# Patient Record
Sex: FEMALE | Race: WHITE | NOT HISPANIC OR LATINO | Employment: UNEMPLOYED | URBAN - METROPOLITAN AREA
[De-identification: names, ages, dates, MRNs, and addresses within clinical notes are randomized per-mention and may not be internally consistent; named-entity substitution may affect disease eponyms.]

---

## 2017-02-08 ENCOUNTER — ALLSCRIPTS OFFICE VISIT (OUTPATIENT)
Dept: OTHER | Facility: OTHER | Age: 62
End: 2017-02-08

## 2017-03-16 RX ORDER — IBUPROFEN 200 MG
200 TABLET ORAL EVERY 6 HOURS PRN
COMMUNITY
End: 2020-10-28 | Stop reason: ALTCHOICE

## 2017-03-16 RX ORDER — ASCORBIC ACID 500 MG
500 TABLET ORAL DAILY
COMMUNITY
End: 2018-05-22

## 2017-03-16 RX ORDER — VIT C/HESPERIDIN/BIOFLAVONOIDS 500-100 MG
TABLET ORAL
COMMUNITY
End: 2018-05-22

## 2017-03-16 RX ORDER — PHENOL 1.4 %
600 AEROSOL, SPRAY (ML) MUCOUS MEMBRANE DAILY
COMMUNITY
End: 2018-05-22

## 2017-03-19 ENCOUNTER — ANESTHESIA EVENT (OUTPATIENT)
Dept: GASTROENTEROLOGY | Facility: MEDICAL CENTER | Age: 62
End: 2017-03-19
Payer: COMMERCIAL

## 2017-03-20 ENCOUNTER — HOSPITAL ENCOUNTER (OUTPATIENT)
Facility: MEDICAL CENTER | Age: 62
Setting detail: OUTPATIENT SURGERY
Discharge: HOME | End: 2017-03-20
Attending: INTERNAL MEDICINE | Admitting: INTERNAL MEDICINE
Payer: COMMERCIAL

## 2017-03-20 ENCOUNTER — GENERIC CONVERSION - ENCOUNTER (OUTPATIENT)
Dept: GASTROENTEROLOGY | Facility: MEDICAL CENTER | Age: 62
End: 2017-03-20

## 2017-03-20 ENCOUNTER — ANESTHESIA (OUTPATIENT)
Dept: GASTROENTEROLOGY | Facility: MEDICAL CENTER | Age: 62
End: 2017-03-20
Payer: COMMERCIAL

## 2017-03-20 VITALS
TEMPERATURE: 96.3 F | DIASTOLIC BLOOD PRESSURE: 64 MMHG | HEIGHT: 59 IN | OXYGEN SATURATION: 100 % | HEART RATE: 56 BPM | RESPIRATION RATE: 15 BRPM | WEIGHT: 140 LBS | BODY MASS INDEX: 28.22 KG/M2 | SYSTOLIC BLOOD PRESSURE: 121 MMHG

## 2017-03-20 DIAGNOSIS — K21.9 GASTRO-ESOPHAGEAL REFLUX DISEASE WITHOUT ESOPHAGITIS: ICD-10-CM

## 2017-03-20 DIAGNOSIS — Z12.11 ENCOUNTER FOR SCREENING FOR MALIGNANT NEOPLASM OF COLON: ICD-10-CM

## 2017-03-20 PROCEDURE — 88342 IMHCHEM/IMCYTCHM 1ST ANTB: CPT | Performed by: INTERNAL MEDICINE

## 2017-03-20 PROCEDURE — 88305 TISSUE EXAM BY PATHOLOGIST: CPT | Performed by: INTERNAL MEDICINE

## 2017-03-20 RX ORDER — PROPOFOL 10 MG/ML
INJECTION, EMULSION INTRAVENOUS AS NEEDED
Status: DISCONTINUED | OUTPATIENT
Start: 2017-03-20 | End: 2017-03-20 | Stop reason: SURG

## 2017-03-20 RX ORDER — SODIUM CHLORIDE 9 MG/ML
125 INJECTION, SOLUTION INTRAVENOUS CONTINUOUS
Status: DISCONTINUED | OUTPATIENT
Start: 2017-03-20 | End: 2017-03-20 | Stop reason: HOSPADM

## 2017-03-20 RX ADMIN — SODIUM CHLORIDE 125 ML/HR: 0.9 INJECTION, SOLUTION INTRAVENOUS at 07:41

## 2017-03-20 RX ADMIN — PROPOFOL 25 MG: 10 INJECTION, EMULSION INTRAVENOUS at 08:21

## 2017-03-20 RX ADMIN — SODIUM CHLORIDE: 0.9 INJECTION, SOLUTION INTRAVENOUS at 08:08

## 2017-03-20 RX ADMIN — PROPOFOL 25 MG: 10 INJECTION, EMULSION INTRAVENOUS at 08:13

## 2017-03-20 RX ADMIN — PROPOFOL 125 MG: 10 INJECTION, EMULSION INTRAVENOUS at 08:08

## 2017-03-20 RX ADMIN — PROPOFOL 50 MG: 10 INJECTION, EMULSION INTRAVENOUS at 08:30

## 2017-03-20 RX ADMIN — PROPOFOL 50 MG: 10 INJECTION, EMULSION INTRAVENOUS at 08:24

## 2017-03-20 RX ADMIN — PROPOFOL 25 MG: 10 INJECTION, EMULSION INTRAVENOUS at 08:18

## 2017-03-20 RX ADMIN — LIDOCAINE HYDROCHLORIDE 50 MG: 20 INJECTION, SOLUTION INTRAVENOUS at 08:08

## 2017-03-23 ENCOUNTER — ALLSCRIPTS OFFICE VISIT (OUTPATIENT)
Dept: OTHER | Facility: OTHER | Age: 62
End: 2017-03-23

## 2017-03-23 LAB
CLARITY UR: NORMAL
COLOR UR: YELLOW
GLUCOSE (HISTORICAL): NORMAL
HGB UR QL STRIP.AUTO: NORMAL
KETONES UR STRIP-MCNC: NORMAL MG/DL
LEUKOCYTE ESTERASE UR QL STRIP: NORMAL
NITRITE UR QL STRIP: NORMAL
PH UR STRIP.AUTO: 6.5 [PH]
PROT UR STRIP-MCNC: NORMAL MG/DL
SP GR UR STRIP.AUTO: 1

## 2017-04-03 ENCOUNTER — GENERIC CONVERSION - ENCOUNTER (OUTPATIENT)
Dept: OTHER | Facility: OTHER | Age: 62
End: 2017-04-03

## 2017-04-03 LAB
HEPATITIS C ANTIBODY (HISTORICAL): NORMAL
SIGNAL TO CUT-OFF (HISTORICAL): 0.01

## 2017-04-05 ENCOUNTER — GENERIC CONVERSION - ENCOUNTER (OUTPATIENT)
Dept: OTHER | Facility: OTHER | Age: 62
End: 2017-04-05

## 2017-04-26 ENCOUNTER — HOSPITAL ENCOUNTER (OUTPATIENT)
Dept: MRI IMAGING | Facility: HOSPITAL | Age: 62
Discharge: HOME | End: 2017-04-26
Attending: NEUROLOGICAL SURGERY
Payer: COMMERCIAL

## 2017-04-28 ENCOUNTER — TRANSCRIBE ORDERS (OUTPATIENT)
Dept: ADMINISTRATIVE | Facility: HOSPITAL | Age: 62
End: 2017-04-28

## 2017-04-28 ENCOUNTER — HOSPITAL ENCOUNTER (OUTPATIENT)
Dept: MRI IMAGING | Facility: HOSPITAL | Age: 62
Discharge: HOME | End: 2017-04-28
Attending: NEUROLOGICAL SURGERY
Payer: COMMERCIAL

## 2017-04-28 DIAGNOSIS — D42.0 NEOPLASM OF UNCERTAIN BEHAVIOR OF CEREBRAL MENINGES (HCC): ICD-10-CM

## 2017-04-28 PROCEDURE — 70553 MRI BRAIN STEM W/O & W/DYE: CPT

## 2017-04-28 PROCEDURE — A9585 GADOBUTROL INJECTION: HCPCS | Performed by: NEUROLOGICAL SURGERY

## 2017-04-28 RX ADMIN — GADOBUTROL 6 ML: 604.72 INJECTION INTRAVENOUS at 21:29

## 2017-05-02 ENCOUNTER — TRANSCRIBE ORDERS (OUTPATIENT)
Dept: ADMINISTRATIVE | Facility: HOSPITAL | Age: 62
End: 2017-05-02

## 2017-05-02 ENCOUNTER — ALLSCRIPTS OFFICE VISIT (OUTPATIENT)
Dept: OTHER | Facility: OTHER | Age: 62
End: 2017-05-02

## 2017-05-02 DIAGNOSIS — Z48.811 AFTERCARE FOLLOWING SURGERY OF THE NERVOUS SYSTEM, NEC: Primary | ICD-10-CM

## 2017-05-02 DIAGNOSIS — Z48.811 ENCOUNTER FOR SURGICAL AFTERCARE FOLLOWING SURGERY OF NERVOUS SYSTEM: ICD-10-CM

## 2017-06-26 ENCOUNTER — GENERIC CONVERSION - ENCOUNTER (OUTPATIENT)
Dept: OTHER | Facility: OTHER | Age: 62
End: 2017-06-26

## 2017-06-26 ENCOUNTER — APPOINTMENT (OUTPATIENT)
Dept: RADIATION ONCOLOGY | Facility: HOSPITAL | Age: 62
End: 2017-06-26
Attending: RADIOLOGY
Payer: COMMERCIAL

## 2017-06-26 PROCEDURE — 99214 OFFICE O/P EST MOD 30 MIN: CPT | Performed by: RADIOLOGY

## 2017-06-26 PROCEDURE — G0463 HOSPITAL OUTPT CLINIC VISIT: HCPCS | Performed by: RADIOLOGY

## 2017-10-05 ENCOUNTER — ALLSCRIPTS OFFICE VISIT (OUTPATIENT)
Dept: OTHER | Facility: OTHER | Age: 62
End: 2017-10-05

## 2017-10-05 LAB
BILIRUB UR QL STRIP: NORMAL
CLARITY UR: NORMAL
COLOR UR: YELLOW
GLUCOSE (HISTORICAL): NORMAL
HGB UR QL STRIP.AUTO: NORMAL
KETONES UR STRIP-MCNC: NORMAL MG/DL
LEUKOCYTE ESTERASE UR QL STRIP: NORMAL
NITRITE UR QL STRIP: NORMAL
PH UR STRIP.AUTO: 6 [PH]
PROT UR STRIP-MCNC: NORMAL MG/DL
SP GR UR STRIP.AUTO: 1
UROBILINOGEN UR QL STRIP.AUTO: NORMAL

## 2017-10-25 ENCOUNTER — HOSPITAL ENCOUNTER (OUTPATIENT)
Dept: MRI IMAGING | Facility: HOSPITAL | Age: 62
Discharge: HOME | End: 2017-10-25
Attending: NEUROLOGICAL SURGERY
Payer: COMMERCIAL

## 2017-10-25 DIAGNOSIS — Z48.811 ENCOUNTER FOR SURGICAL AFTERCARE FOLLOWING SURGERY OF NERVOUS SYSTEM: ICD-10-CM

## 2017-10-25 PROCEDURE — A9585 GADOBUTROL INJECTION: HCPCS | Performed by: NEUROLOGICAL SURGERY

## 2017-10-25 PROCEDURE — 70553 MRI BRAIN STEM W/O & W/DYE: CPT

## 2017-10-25 RX ADMIN — GADOBUTROL 6 ML: 604.72 INJECTION INTRAVENOUS at 20:08

## 2017-11-02 ENCOUNTER — ALLSCRIPTS OFFICE VISIT (OUTPATIENT)
Dept: OTHER | Facility: OTHER | Age: 62
End: 2017-11-02

## 2017-11-02 ENCOUNTER — TRANSCRIBE ORDERS (OUTPATIENT)
Dept: ADMINISTRATIVE | Facility: HOSPITAL | Age: 62
End: 2017-11-02

## 2017-11-02 DIAGNOSIS — D42.0 TERATOMA OF UNCERTAIN BEHAVIOR OF CEREBRAL MENINGES (HCC): Primary | ICD-10-CM

## 2017-11-02 DIAGNOSIS — C67.9 MALIGNANT NEOPLASM OF BLADDER (HCC): ICD-10-CM

## 2017-11-02 DIAGNOSIS — Z48.3 AFTERCARE FOLLOWING SURGERY FOR NEOPLASM: ICD-10-CM

## 2017-11-02 DIAGNOSIS — D42.0 NEOPLASM OF UNCERTAIN BEHAVIOR OF CEREBRAL MENINGES (HCC): ICD-10-CM

## 2017-11-03 NOTE — PROGRESS NOTES
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)    Plan   · Follow-up visit in 6 months Evaluation and Treatment  Follow-up  Status: Hold For -  Scheduling  Requested for: [id number]   Ordered; For: Adenocarcinoma of bladder, Aftercare following surgery for neoplasm, Atypical intracranial meningioma; Ordered By: China Meléndez Performed:  Due: [id number]   · * MRI BRAIN W WO CONTRAST; Status:Need Information - Financial Authorization; Requested GKV:[id number];    Perform:[facility]; OOU:[id number]; Ordered; For:Adenocarcinoma of bladder, Aftercare following surgery for neoplasm, Atypical intracranial meningioma; Ordered By:Christiano Davis;    Discussion/Summary    This is a 44-year-old female who had a very large atypical meningioma  She is status post resection and radiation therapy  In general she is doing exceptionally well  She has no neurologic deficits  I have recommended continued vigilant six-month evaluations given the aggressiveness of this tumor  There are 2 small areas of contrast enhancement on the MRI which likely are scar but will declare themselves over time  Chief Complaint  Patient presents for 6 month follow up with MRI Brain w/wo contrast s/p XRT 6/26/15- 8/7/15 and Image-guided right frontotemporal craniotomy for resection of the large meningioma with dural reconstruction on 5/6/15 (Approx  2 years out)Path: 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)      History of Present Illness  Patient is a 67yo female with a history of AVM status post clipping who presented to [address] [place] [address] ED on 5/2/15 complaining of headaches, acoustic sensitivity and lower extremity weakness  The patient subsequently underwent a CT of the head that showed a 3 cm superficial right frontal mass with significant vasogenic edema and mass effect   The patient was subsequently admitted and had neurosurgery consultation for decompression as she was found to have large right to left midline shift with uncal herniation, as well as significant edema  The patient had an MRI of the brain as well as a CAT scan of the chest, abdomen and pelvis  The CT of the chest, abdomen and pelvis showed a 10 mm subtle papillary soft tissue projection along the left posterior paramedian bladder wall  The, urology was consulted for further correlation and they recommended that she have a cystoscopy as an outpatient  MRI of the brain was done that showed a 5 cm right frontal lobe mass consistent with meningioma  patient did undergo an image-guided right frontotemporal craniotomy for resection of the large meningioma with dural reconstruction on May 6, 2015 by Dr Clark Bates  The patient did well postoperatively and was followed by neurosurgery  Her TERESSA drain was subsequently removed  The patient did well throughout this hospitalization without any residual neurological deficits  She was instructed to follow up with our office following discharge, as well as the urologist, Dr Jyotsna Qureshi to get a cystoscopy to further evaluate that bladder mass  5/21/15, patient returned to our office for POV s/p resection or a large meningioma  She was doing well  The patient reported headache  Pathology determined to represent atypical form with high risk for recurrence  XRT was indicated  6/25/15, she was seen for her 6 week POV  She was on Keppra and was to continue on it  Recommended no driving at that point  Continued follow up was essential   returned to our office on 10/27/15 after XRT  MRI of the brain with and without contrast demonstrated no regions of abnormal contrast enhancement  The midline shift was completely corrected  There were no extra axial collections  There was no evidence of disease  Her performance scale was without deficits  She does have rare headaches which are treated with Tylenol   I recommended the following Keppra taper:  AM dose PM [id number]  [id number]  [id number]  NONEStop Paige  seen on 4/28/16 for follow up with routine MRI of the brain for review  She reported completion of Keppra as directed  She presented 1 year out from the resection of a atypical meningioma; she was neurologically intact and doing well save for some anxiety  This was inhibiting her from returning to work I recommended that she consult with a psychologist who might be very helpful in improving her anxiety  I told her that this was sometimes expected after surgery like this, however the frontal lobe function should not produce such a function intrinsically  Given her grade 2 meningioma continued follow-up is important  was seen on 5/2/17 and was overall doing very well  Because of the atypical pathology associated with this mass we recommended six-month follow-ups at this point with imaging  patient complaints of numbness of the right arm occasionally  She also has problems with urinary incontinence but takes medication for this  Other than that, no other complaints at this time  The patient is doing very well in general  She does have some sensitivity over the plates  She is working on a regular basis and does not enjoy her job but this is unchanged in reference to the surgery  Review of Systems    Constitutional: No fever, no chills, feels well, no tiredness, no recent weight gain or weight loss  Eyes: No complaints of eye pain, no red eyes, no eyesight problems, no discharge, no dry eyes, no itching of eyes  ENT: no complaints of earache, no loss of hearing, no nose bleeds, no nasal discharge, no sore throat, no hoarseness  Cardiovascular: No complaints of slow heart rate, no fast heart rate, no chest pain, no palpitations, no leg claudication, no lower extremity edema  Respiratory: No complaints of shortness of breath, no wheezing, no cough, no SOB on exertion, no orthopnea, no PND  Gastrointestinal: No complaints of abdominal pain, no constipation, no nausea or vomiting, no diarrhea, no bloody stools     Genitourinary: incontinence, but-- as noted in HPI  Musculoskeletal: No complaints of arthralgias, no myalgias, no joint swelling or stiffness, no limb pain or swelling  Integumentary: No complaints of skin rash or lesions, no itching, no skin wounds, no breast pain or lump  Neurological: numbness, but-- as noted in HPI  Psychiatric: Not suicidal, no sleep disturbance, no anxiety or depression, no change in personality, no emotional problems  Endocrine: No complaints of proptosis, no hot flashes, no muscle weakness, no deepening of the voice, no feelings of weakness  Hematologic/Lymphatic: No complaints of swollen glands, no swollen glands in the neck, does not bleed easily, does not bruise easily  ROS reviewed  Active Problems  1  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)   2  Aftercare following surgery for neoplasm (V58 42) (Z48 3)   3  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)   4  Bladder mass (596 89) (N32 89)   5  Colon cancer screening (V76 51) (Z12 11)   6  Dysuria (788 1) (R30 0)   7  Encounter for mammogram to establish baseline mammogram (V76 12) (Z12 31)   8  Encounter for surgical aftercare following surgery of nervous system (V58 72) (Z48 811)   9  GERD (gastroesophageal reflux disease) (530 81) (K21 9)   10  Meningioma (225 2) (D32 9)   11  Overactive bladder (596 51) (N32 81)   12  Personal history of colonic polyps (V12 72) (Z86 010)   13  UTI (urinary tract infection) (599 0) (N39 0)    Past Medical History  1  History of arteriovenous malformation (V12 59) (Z87 74)   2  History of Liver problem (573 9) (K76 9)   3  Status post gamma knife treatment (V15 3) (Z92 3)    The active problems and past medical history were reviewed and updated today  Surgical History  1  History of Cholecystotomy   2  History of Craniotomy (Diagnostic)   3  History of Diagnostic Cystoscopy   4  History of Liver Surgery   5  History of Tubal Ligation    The surgical history was reviewed and updated today         Family History  Mother    1  Family history of cardiomegaly (V17 49) (Z82 49)   2  Family history of lung cancer (V16 1) (Z80 1)  Father    3  Family history of lung cancer (V16 1) (Z80 1)   4  Family history of malignant neoplasm of brain (V16 8) (Z80 8)  Sister    5  Family history of Diverticulitis  Maternal Grandmother    6  Family history of renal failure (V18 69) (Z84 1)  Paternal Grandmother    7  Family history of lung cancer (V16 1) (Z80 1)   8  Family history of malignant neoplasm of brain (V16 8) (Z80 8)   9  Family history of malignant neoplasm of breast (V16 3) (Z80 3)    The family history was reviewed and updated today  Social History   · Denied: History of Drug use   · Former smoker (V15 82) (W86 025)   · Four children   · High school or GED   · Lives with family   ·    · Occasional alcohol use   · Occupation   · Denied: History of Sexually active  The social history was reviewed and updated today  Current Meds   1  B Complex CAPS; take 1 capsule daily; Therapy: (Recorded:02Nov2017) to Recorded   2  Biotin CAPS; take 1 capsule daily; Therapy: (Recorded:02Nov2017) to Recorded   3  Calcium 600 MG Oral Tablet; TAKE 1 TABLET DAILY; Therapy: (Recorded:02May2017) to Recorded   4  Daily Multivitamin TABS; TAKE 1 TABLET DAILY; Therapy: (Recorded:22May2015) to Recorded   5  Ibuprofen 200 MG Oral Capsule; 1-2 caps daily as needed for headaches; Therapy: (Rebekah Ochoa) to Recorded   6  Oxybutynin Chloride ER 10 MG Oral Tablet Extended Release 24 Hour; Take 1 tablet   daily; Therapy: 52DSA5642 to (Evaluate:85Agt0056)  Requested for: 87LYZ7150; Last   Rx:13Oct2017 Ordered   7  Tylenol Extra Strength 500 MG Oral Tablet; TAKE 1 TABLET EVERY 4 TO 6 HOURS AS   NEEDED; Therapy: (Recorded:22May2015) to Recorded   8  Vitamin C 500 MG Oral Tablet; TAKE 1 TABLET DAILY; Therapy: (Rebekah Ochoa) to Recorded   9  Zantac CAPS; TAKE 1 CAPSULE AT BEDTIME PRN;    Therapy: (Recorded:02Nov2017) to Recorded    The medication list was reviewed and updated today  Allergies  1  No Known Drug Allergies  2  IV Dye    Vitals  Vital Signs    Recorded: 83SFR8598 10:05AM   Temperature 97 8 F   Heart Rate 62   Respiration 18   Systolic 782   Diastolic 73   Height 4 ft 11 in   Weight 141 lb 5 oz   BMI Calculated 28 54   BSA Calculated 1 59   Pain Scale 0     Physical Exam     Mental Status: Alert and Oriented x3  -- Memory is intact  -- Attentive  -- Speech is articulate and fluent  -- Knowledge and vocabulary consistent with education  -- Grossly nonfocal     Cranial Nerve Exam:  2nd cranial nerve: Normal with no noted deficit  -- 3rd, 4th, and 6th cranial nerves: PERRLA and EOMI without later gaze nystagmus  -- 5th cranial nerve: Normal with no noted deficit  -- 7th cranial nerve: Face symmetrical at grimace and at rest  -- 8th cranial nerve: Grossly intact to finger rub bilaterally  -- 11th cranial nerve: Shoulder shrug equal bilaterally  Motor System - Upper Extremities: Muscle strength: 5/5 bilaterally  -- Muscle tone: Normal bilaterally  -- Muscle Bulk: Normal Muscle bulk bilaterally  Motor System - Lower Extremities: Muscle strength: 5/5 bilaterally  -- Muscle tone: Normal bilaterally  -- Muscle Bulk: Normal Muscle bulk bilaterally  Coordination: Coordination: Finger to nose was normal, heel to knee to shin was normal able to perform rapid alternating movements well bilaterally  -- Involuntary movements: None   Sensory: Sensation:Sensation grossly intact to light tough and pinprick  Gait and Station: Gait and station: [employer] with a normal gait and station  Tandem walk is normal, Heel walk and toe walk intact and without sign of paresis  Results/Data  Diagnostic Studies Reviewed Neurosurger [facility]:   I personally reviewed the MRI of the brain in detail with the patient  MRI Review and MRI of the brain is carefully reviewed with the patient   This demonstrates complete resection of the meningioma  There are 2 contrast enhancing regions which are subcentimeter in size and may reflect residual tumor versus scar  Health Management  Colon cancer screening   COLONOSCOPY (GI, SURG); every 10 years; Last [id number]; Next Due: 20Mar2027; Active    Future Appointments    Date/Time Provider Specialty Site   04/05/2018 08:30 AM SUMANTH Devine   Urology [facility]   11/30/2017 09:15 AM [facility]  [address]     Signatures   Electronically signed by : SUMANTH Seymour ; Nov 2 2017 11:03AM EST                       (Author)

## 2017-12-07 ENCOUNTER — APPOINTMENT (OUTPATIENT)
Dept: RADIATION ONCOLOGY | Facility: HOSPITAL | Age: 62
End: 2017-12-07
Attending: RADIOLOGY
Payer: COMMERCIAL

## 2017-12-07 ENCOUNTER — GENERIC CONVERSION - ENCOUNTER (OUTPATIENT)
Dept: OTHER | Facility: OTHER | Age: 62
End: 2017-12-07

## 2017-12-07 PROCEDURE — G0463 HOSPITAL OUTPT CLINIC VISIT: HCPCS | Performed by: RADIOLOGY

## 2017-12-07 PROCEDURE — 99214 OFFICE O/P EST MOD 30 MIN: CPT | Performed by: RADIOLOGY

## 2017-12-13 ENCOUNTER — GENERIC CONVERSION - ENCOUNTER (OUTPATIENT)
Dept: OTHER | Facility: OTHER | Age: 62
End: 2017-12-13

## 2018-01-12 VITALS
WEIGHT: 140.13 LBS | SYSTOLIC BLOOD PRESSURE: 132 MMHG | BODY MASS INDEX: 28.25 KG/M2 | HEIGHT: 59 IN | HEART RATE: 60 BPM | OXYGEN SATURATION: 99 % | DIASTOLIC BLOOD PRESSURE: 76 MMHG | TEMPERATURE: 96.3 F

## 2018-01-12 NOTE — RESULT NOTES
Message   please change remidner to 10 years  thanks  Verified Results  (Q) HEPATITIS C ANTIBODY [id number] 12:47PM Alix Erica   REPORT COMMENT:  ON HOLD  FASTING:NO     Test Name Result Flag Reference   HEPATITIS C ANTIBODY NON-REACTIVE  NON-REACTIVE   SIGNAL TO CUT-OFF 0 01  <1 00     (1) TISSUE EXAM [id number] 08:15AM Alix Erica     Test Name Result Flag Reference   LAB AP CASE REPORT (Report)     Surgical Pathology Report             Case: [id number]                   Authorizing Provider: Babatunde Jarrett MD       Collected:      03/20/2017 0815        Ordering Location:   [address] [place] [address]    Received:      03/20/2017 [facility]                            Pathologist:      Marychuy Valencia MD                             Specimens:  A) - Stomach, Gastric body bx                                     B) - Large Intestine, Sigmoid Colon, Sigmoid polyp   LAB AP FINAL DIAGNOSIS (Report)     A  Stomach, body, biopsy:  - Chronic inactive oxyntic gastritis  - Negative for Helicobacter pylori, confirmed by immunohistochemical   stain, evaluated with appropriate positive controls  - Negative for atrophy, intestinal metaplasia, dysplasia or carcinoma  B  Colon, sigmoid polyp, biopsy:  - Portion of benign colonic mucosa with reactive surface change and   mucosal lymphoid aggregate, negative for adenoma or carcinoma  Electronically signed by Marychuy Valencia MD on 3/22/2017 at 11:34 AM   LAB AP SURGICAL ADDITIONAL INFORMATION (Report)     These tests were developed and their performance characteristics   determined by [facility] or [facility]  They may not be cleared or approved by the [place]  Food and   Drug Administration  The FDA has determined that such clearance or   approval is not necessary  These tests are used for clinical purposes  They should not be regarded as investigational or for research   This   laboratory has been approved by [place] , designated as a high-complexity   laboratory and is qualified to perform these tests  Interpretation performed at [facility], [address] [place] [address]   LAB AP GROSS DESCRIPTION (Report)     A  The specimen is received in formalin, labeled with the patient's name   and hospital number, and is designated gastric body biopsy  The   specimen consists of several, rubbery, tan-brown tissue fragments   measuring 0 8 x 0 6 x 0 2 cm in aggregate dimension  Entirely submitted  One cassette  B  The specimen is received in formalin, labeled with the patient's name   and hospital number, and is designated sigmoid polyp  The specimen   consists of a single, rubbery, tan-brown tissue fragment measuring up to   0 3 cm in greatest dimension  Entirely submitted  One cassette  Note: The estimated total formalin fixation time based upon information   provided by the submitting clinician and the standard processing schedule   is 20 0 hours  [facility]   LAB AP CLINICAL INFORMATION R/o H pylori     R/o H pylori       Discussion/Summary   Biopsies were negative in the stomach and hte colon polyp was completely benign  I would suggest repeating the colonoscopy in 10 years or sooner if there is any family history of colon polyps/ cancer or if she has symptoms  She did have some findings of inflammation in the esopahgus due to acid reflux  Continue with acid reflux diet

## 2018-01-13 VITALS
SYSTOLIC BLOOD PRESSURE: 120 MMHG | HEIGHT: 59 IN | WEIGHT: 142 LBS | DIASTOLIC BLOOD PRESSURE: 80 MMHG | BODY MASS INDEX: 28.63 KG/M2

## 2018-01-13 VITALS
TEMPERATURE: 97.6 F | HEART RATE: 67 BPM | BODY MASS INDEX: 28.52 KG/M2 | SYSTOLIC BLOOD PRESSURE: 130 MMHG | WEIGHT: 141.5 LBS | OXYGEN SATURATION: 98 % | RESPIRATION RATE: 16 BRPM | HEIGHT: 59 IN | DIASTOLIC BLOOD PRESSURE: 74 MMHG

## 2018-01-14 VITALS
WEIGHT: 139 LBS | BODY MASS INDEX: 28.02 KG/M2 | HEIGHT: 59 IN | HEART RATE: 52 BPM | SYSTOLIC BLOOD PRESSURE: 120 MMHG | DIASTOLIC BLOOD PRESSURE: 60 MMHG

## 2018-01-15 VITALS
RESPIRATION RATE: 18 BRPM | TEMPERATURE: 97.8 F | BODY MASS INDEX: 28.49 KG/M2 | DIASTOLIC BLOOD PRESSURE: 73 MMHG | HEIGHT: 59 IN | SYSTOLIC BLOOD PRESSURE: 141 MMHG | WEIGHT: 141.31 LBS | HEART RATE: 62 BPM

## 2018-01-16 NOTE — MISCELLANEOUS
Dear Murphy Truongr,    [address] office has attempted to contact you regarding your results  Please give our office a call back at [phone]      Thank you,     [facility]      Electronically signed by:Rosy Patel   Apr 7 2017  9:18AM EST

## 2018-01-17 NOTE — PROGRESS NOTES
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)   2  Encounter for surgical aftercare following surgery of nervous system (V58 72) (Z48 811)    Plan    · Follow-up visit in 6 months Evaluation and Treatment  Follow-up  Status: Complete   Done: [id number]   Ordered; For: Encounter for surgical aftercare following surgery of nervous system; Ordered By: Ba Gamez Performed:  Due: 31BYN1091; Last Updated By: Bonifacio Osborne; 5/2/2017 9:57:32 AM   · * MRI BRAIN W WO CONTRAST; Status:Need Information - Financial Authorization; Requested for:[id number];    Perform:[facility]; LTP:[id number]; Last Updated By:Consuelo Kerns; 5/2/2017 10:08:10 AM;Ordered;  For:Encounter for surgical aftercare following surgery of nervous system; Ordered By:Haroon Davis;    Discussion/Summary    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  Overall the patient is doing very well  Because of the atypical pathology associated with this mass I've recommended six-month follow-ups at this point  Chief Complaint  Patient presents for 1yr follow up w/MRI Brain w/wo contrast s/p XRT 6/26/15- 8/7/15 and Image-guided right frontotemporal craniotomy for resection of the large meningioma with dural reconstruction on 5/6/15 (Approx  2 years out)      History of Present Illness  Patient is a 65yo female with a history of AVM status post clipping who presented to [address] [place] [address] ED on 5/2/15 complaining of headaches, acoustic sensitivity and lower extremity weakness  The patient subsequently underwent a CT of the head that showed a 3 cm superficial right frontal mass with significant vasogenic edema and mass effect  The patient was subsequently admitted and had neurosurgery consultation for decompression as she was found to have large right to left midline shift with uncal herniation, as well as significant edema  The patient had an MRI of the brain as well as a CAT scan of the chest, abdomen and pelvis   The CT of the chest, abdomen and pelvis showed a 10 mm subtle papillary soft tissue projection along the left posterior paramedian bladder wall  The, urology was consulted for further correlation and they recommended that she have a cystoscopy as an outpatient  MRI of the brain was done that showed a 5 cm right frontal lobe mass consistent with meningioma  The patient did undergo an image-guided right frontotemporal craniotomy for resection of the large meningioma with dural reconstruction on May 6, 2015 by Dr Richelle Reyez  The patient did well postoperatively and was followed by neurosurgery  Her TERESSA drain was subsequently removed  The patient did well throughout this hospitalization without any residual neurological deficits  She was instructed to follow up with our office following discharge, as well as the urologist, Dr Clarice Patel to get a cystoscopy to further evaluate that bladder mass  On 5/21/15, patient returned to our office for POV s/p resection or a large meningioma  She was doing well  The patient reported headache  Pathology determined to represent atypical form with high risk for recurrence  XRT was indicated  On 6/25/15, she was seen for her 6 week POV  She was on Keppra and was to continue on it  Recommended no driving at that point  Continued follow up was essential     She returned to our office on 10/27/15 after XRT  MRI of the brain with and without contrast demonstrated no regions of abnormal contrast enhancement  The midline shift was completely corrected  There were no extra axial collections  There was no evidence of disease  Her performance scale was without deficits  She does have rare headaches which are treated with Tylenol  I recommended the following Keppra taper:    Date AM dose PM Dose  11/1 750  375  11/8 375  375  11/15 375  NONE  11/22 Stop Keppra    Patient seen on 4/28/16 for follow up with routine MRI of the brain for review  She reported completion of Keppra as directed   She presented 1 year out from the resection of a atypical meningioma; she was neurologically intact and doing well save for some anxiety  This was inhibiting her from returning to work I recommended that she consult with a psychologist who might be very helpful in improving her anxiety  I told her that this was sometimes expected after surgery like this, however the frontal lobe function should not produce such a function intrinsically  Given her grade 2 meningioma continued follow-up is important  Today, she reports about 2 weeks ago she had a severe headache which lasted the whole day and she took Tylenol  Besides that, has no other symptoms  Review of Systems    Constitutional: No fever, no chills, feels well, no tiredness, no recent weight gain or weight loss  Eyes: No complaints of eye pain, no red eyes, no eyesight problems, no discharge, no dry eyes, no itching of eyes  ENT: no complaints of earache, no loss of hearing, no nose bleeds, no nasal discharge, no sore throat, no hoarseness  Cardiovascular: No complaints of slow heart rate, no fast heart rate, no chest pain, no palpitations, no leg claudication, no lower extremity edema  Respiratory: No complaints of shortness of breath, no wheezing, no cough, no SOB on exertion, no orthopnea, no PND  Gastrointestinal: No complaints of abdominal pain, no constipation, no nausea or vomiting, no diarrhea, no bloody stools  Genitourinary: No complaints of dysuria, no incontinence, no pelvic pain, no dysmenorrhea, no vaginal discharge or bleeding  Musculoskeletal: No complaints of arthralgias, no myalgias, no joint swelling or stiffness, no limb pain or swelling  Integumentary: No complaints of skin rash or lesions, no itching, no skin wounds, no breast pain or lump  Neurological: No complaints of headache, no confusion, no convulsions, no numbness, no dizziness or fainting, no tingling, no limb weakness, no difficulty walking     Psychiatric: Not suicidal, no sleep disturbance, no anxiety or depression, no change in personality, no emotional problems  Endocrine: No complaints of proptosis, no hot flashes, no muscle weakness, no deepening of the voice, no feelings of weakness  Hematologic/Lymphatic: No complaints of swollen glands, no swollen glands in the neck, does not bleed easily, does not bruise easily  ROS reviewed  Active Problems    1  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)   2  Aftercare following surgery for neoplasm (V58 42) (Z48 3)   3  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)   4  Bladder mass (596 89) (N32 89)   5  Colon cancer screening (V76 51) (Z12 11)   6  Dysuria (788 1) (R30 0)   7  Encounter for mammogram to establish baseline mammogram (V76 12) (Z12 31)   8  GERD (gastroesophageal reflux disease) (530 81) (K21 9)   9  Meningioma (225 2) (D32 9)   10  Personal history of colonic polyps (V12 72) (Z86 010)   11  UTI (urinary tract infection) (599 0) (N39 0)    Past Medical History    1  History of arteriovenous malformation (V12 59) (Z87 74)   2  History of Liver problem (573 9) (K76 9)   3  Status post gamma knife treatment (V15 3) (Z92 3)    The active problems and past medical history were reviewed and updated today  Surgical History    1  History of Cholecystotomy   2  History of Craniotomy (Diagnostic)   3  History of Diagnostic Cystoscopy   4  History of Liver Surgery   5  History of Tubal Ligation    The surgical history was reviewed and updated today  Family History  Mother    1  Family history of cardiomegaly (V17 49) (Z82 49)   2  Family history of lung cancer (V16 1) (Z80 1)  Father    3  Family history of lung cancer (V16 1) (Z80 1)   4  Family history of malignant neoplasm of brain (V16 8) (Z80 8)  Sister    5  Family history of Diverticulitis  Maternal Grandmother    6  Family history of renal failure (V18 69) (Z84 1)  Paternal Grandmother    7   Family history of lung cancer (V16 1) (Z80 1) 8  Family history of malignant neoplasm of brain (V16 8) (Z80 8)   9  Family history of malignant neoplasm of breast (V16 3) (Z80 3)    The family history was reviewed and updated today  Social History    · Alcohol use (V49 89) (Z78 9)   · Denied: History of Drug use   · Former smoker (V15 82) (K11 089)   · Four children   · High school or GED   · Lives with family   ·    · Non drinker / no alcohol use   · Occupation   · Denied: History of Sexually active  The social history was reviewed and updated today  Current Meds   1  Calcium 600 MG Oral Tablet; TAKE 1 TABLET DAILY; Therapy: (Recorded:02May2017) to Recorded   2  Daily Multivitamin TABS; TAKE 1 TABLET DAILY; Therapy: (Recorded:22May2015) to Recorded   3  Ibuprofen 200 MG Oral Capsule; 1-2 caps daily as needed for headaches; Therapy: (Cephus Moles) to Recorded   4  Tylenol Extra Strength 500 MG Oral Tablet; TAKE 1 TABLET EVERY 4 TO 6 HOURS AS   NEEDED; Therapy: (Recorded:22May2015) to Recorded   5  Vitamin C 500 MG Oral Tablet; TAKE 1 TABLET DAILY; Therapy: (Cephus Moles) to Recorded   6  Vitamin D3 1000 UNIT Oral Capsule; take 1 capsule daily; Therapy: (Cephus Moles) to Recorded   7  Zantac CAPS; TAKE 1 CAPSULE AT BEDTIME; Therapy: (Recorded:02May2017) to Recorded   8  Zinc 30 MG Oral Capsule; take 1 capsule daily; Therapy: (Recorded:02May2017) to Recorded    The medication list was reviewed and updated today  Allergies    1  No Known Drug Allergies    2  IV Dye    Vitals  Vital Signs    Recorded: [id number] 09:21AM   Temperature 97 3 F   Heart Rate 62   Respiration 16   Systolic 262   Diastolic 75   Height 4 ft 11 in   Weight 138 lb 8 oz   BMI Calculated 27 97   BSA Calculated 1 58   Pain Scale 0     Physical Exam     Mental Status: Alert and Oriented x3  Memory is intact  Attentive  Speech is articulate and fluent  Knowledge and vocabulary consistent with education    Grossly nonfocal   Judgment and insight: Normal   Mood and affect: Normal     Cranial Nerve Exam:  2nd cranial nerve: Normal with no noted deficit  3rd, 4th, and 6th cranial nerves: PERRLA and EOMI without later gaze nystagmus  5th cranial nerve: Normal with no noted deficit  7th cranial nerve: Face symmetrical at grimace and at rest   8th cranial nerve: Grossly intact to finger rub bilaterally  9th and 10th cranial nerves: Uvula is mideline and gag reflex present  11th cranial nerve: Shoulder shrug equal bilaterally  Motor System - Upper Extremities: Muscle strength: 5/5 bilaterally  Muscle tone: Normal bilaterally  Muscle Bulk: Normal Muscle bulk bilaterally  Motor System - Lower Extremities: Muscle strength: 5/5 bilaterally  Muscle tone: Normal bilaterally  Muscle Bulk: Normal Muscle bulk bilaterally  Coordination: Coordination: Finger to nose was normal, heel to knee to shin was normal able to perform rapid alternating movements well bilaterally  Involuntary movements: None   Sensory: Sensation:Sensation grossly intact to light tough and pinprick  Gait and Station: Gait and station: [place] with a normal gait and station  Tandem walk is normal, Heel walk and toe walk intact and without sign of paresis  Constitutional General appearance: No acute distress, well appearing and well nourished  Results/Data        Diagnostic Studies Reviewed Neurosurger [facility]:   I personally reviewed the MRI of the brain in detail with the patient  MRI Review MRI of the brain demonstrates a complete resection  There is no sign of recurrence  The brain is moving into the cavity created previously been occupied by the tumor  Health Management  Colon cancer screening   COLONOSCOPY (GI, SURG); every 10 years; Last 46NPH5475; Next Due: 36Aec0311; Active    Future Appointments    Date/Time Provider Specialty Site   10/05/2017 08:30 AM SUMANTH Ann   Urology [facility]   11/02/2017 09:45 AM Randolph Hidalgo SUMANTH Myers   Neurosurgery [facility]     Signatures   Electronically signed by : SUMANTH Dickens ; May  2 2017  1:22PM EST                       (Author)

## 2018-01-22 VITALS
OXYGEN SATURATION: 99 % | DIASTOLIC BLOOD PRESSURE: 56 MMHG | TEMPERATURE: 97.6 F | WEIGHT: 144.5 LBS | HEART RATE: 66 BPM | HEIGHT: 59 IN | BODY MASS INDEX: 29.13 KG/M2 | RESPIRATION RATE: 16 BRPM | SYSTOLIC BLOOD PRESSURE: 110 MMHG

## 2018-01-22 VITALS
WEIGHT: 138.5 LBS | BODY MASS INDEX: 27.92 KG/M2 | RESPIRATION RATE: 16 BRPM | HEIGHT: 59 IN | DIASTOLIC BLOOD PRESSURE: 75 MMHG | TEMPERATURE: 97.3 F | HEART RATE: 62 BPM | SYSTOLIC BLOOD PRESSURE: 135 MMHG

## 2018-01-24 VITALS
WEIGHT: 144 LBS | SYSTOLIC BLOOD PRESSURE: 120 MMHG | HEIGHT: 59 IN | BODY MASS INDEX: 29.03 KG/M2 | DIASTOLIC BLOOD PRESSURE: 70 MMHG

## 2018-03-14 DIAGNOSIS — R39.15 URINARY URGENCY: Primary | ICD-10-CM

## 2018-03-15 RX ORDER — OXYBUTYNIN CHLORIDE 10 MG/1
TABLET, EXTENDED RELEASE ORAL
Qty: 90 TABLET | Refills: 3 | Status: SHIPPED | OUTPATIENT
Start: 2018-03-15 | End: 2018-05-22

## 2018-04-02 RX ORDER — MULTIVIT-MIN/IRON/FOLIC ACID/K 18-600-40
1 CAPSULE ORAL DAILY
COMMUNITY
End: 2018-05-22

## 2018-04-02 RX ORDER — ERGOCALCIFEROL (VITAMIN D2) 10 MCG
1 TABLET ORAL DAILY
COMMUNITY
End: 2019-11-26 | Stop reason: ALTCHOICE

## 2018-04-02 RX ORDER — RANITIDINE 150 MG/1
CAPSULE ORAL
COMMUNITY
End: 2019-11-19

## 2018-04-02 RX ORDER — ACETAMINOPHEN 500 MG
1 TABLET ORAL
COMMUNITY

## 2018-04-02 RX ORDER — NICOTINE POLACRILEX 2 MG
1 GUM BUCCAL DAILY
COMMUNITY
End: 2019-11-26 | Stop reason: ALTCHOICE

## 2018-04-02 RX ORDER — B-COMPLEX WITH VITAMIN C
1 CAPSULE ORAL DAILY
COMMUNITY

## 2018-04-05 ENCOUNTER — PROCEDURE VISIT (OUTPATIENT)
Dept: UROLOGY | Facility: AMBULATORY SURGERY CENTER | Age: 63
End: 2018-04-05
Payer: COMMERCIAL

## 2018-04-05 VITALS
HEART RATE: 70 BPM | BODY MASS INDEX: 28.91 KG/M2 | HEIGHT: 59 IN | WEIGHT: 143.4 LBS | SYSTOLIC BLOOD PRESSURE: 118 MMHG | DIASTOLIC BLOOD PRESSURE: 70 MMHG

## 2018-04-05 DIAGNOSIS — C67.9 MALIGNANT NEOPLASM OF URINARY BLADDER, UNSPECIFIED SITE (HCC): Primary | ICD-10-CM

## 2018-04-05 DIAGNOSIS — R39.15 URINARY URGENCY: ICD-10-CM

## 2018-04-05 LAB
SL AMB  POCT GLUCOSE, UA: NORMAL
SL AMB LEUKOCYTE ESTERASE,UA: NORMAL
SL AMB POCT BILIRUBIN,UA: NORMAL
SL AMB POCT BLOOD,UA: NORMAL
SL AMB POCT CLARITY,UA: NORMAL
SL AMB POCT COLOR,UA: YELLOW
SL AMB POCT KETONES,UA: 15
SL AMB POCT NITRITE,UA: NORMAL
SL AMB POCT PH,UA: 6
SL AMB POCT SPECIFIC GRAVITY,UA: 1.02
SL AMB POCT URINE PROTEIN: NORMAL
SL AMB POCT UROBILINOGEN: NORMAL

## 2018-04-05 PROCEDURE — 88112 CYTOPATH CELL ENHANCE TECH: CPT | Performed by: PATHOLOGY

## 2018-04-05 PROCEDURE — 52000 CYSTOURETHROSCOPY: CPT | Performed by: UROLOGY

## 2018-04-05 PROCEDURE — 99213 OFFICE O/P EST LOW 20 MIN: CPT | Performed by: UROLOGY

## 2018-04-05 PROCEDURE — 81002 URINALYSIS NONAUTO W/O SCOPE: CPT | Performed by: UROLOGY

## 2018-04-05 NOTE — PROGRESS NOTES
Written Consent Obtained  Indications for Procedure:  History of low-grade bladder cancer  Physical Exam    Constitutional   General appearance: No acute distress, well appearing and well nourished  Pulmonary   Respiratory effort: No increased work of breathing or signs of respiratory distress  Cardiovascular   Examination of extremities for edema and/or varicosities: Normal     Abdomen   Abdomen: Non-tender, no masses  Liver and spleen: No hepatomegaly or splenomegaly  Genitourinary   External genitalia and vagina: Normal, no lesions appreciated  Urethra: Normal, no discharge  Bladder: Not distended, no tenderness  Musculoskeletal   Gait and station: Normal     Skin   Skin and subcutaneous tissue: Normal without rashes or lesions  Lymphatic   Palpation of lymph nodes in groin: No lymphadenopathy  Additional Exam: Neuro exam nonfocal           The flexible cystoscope was introduced into the urethra and advanced into the bladder  URETHRA:  Normal,  without strictures or lesions  TRIGONE & UOs:   Normal anatomy with efflux of clear urine  No mucosal lesions or subtrigonal masses  BLADDER MUCOSA:  Normal, without neoplasms or other lesions  DETRUSOR: Normal capacity, without flaccidity, without excessive compliance, without trabeculation or diverticula, without uninhibited bladder contractions on fillings  RETROFLEXED SCOPE VIEW: Normal bladder neck    Plan:  Cystoscopy today was negative for any recurrence  We discussed her overactive bladder symptoms and oxybutynin  She is having a fair amount of side effects with the medication is working well  She did check with her insurance and Myrbetriq is covered  50 mg samples of the medication were given to her and side effects were discussed  She will try this medicine out and if she wishes to continue with Myrbetriq instead of Ditropan she will call us to make the switch    Otherwise I will see her back in 6 months for repeat cystoscopy

## 2018-04-30 ENCOUNTER — HOSPITAL ENCOUNTER (OUTPATIENT)
Dept: MRI IMAGING | Facility: HOSPITAL | Age: 63
Discharge: HOME | End: 2018-04-30
Attending: NEUROLOGICAL SURGERY
Payer: COMMERCIAL

## 2018-04-30 DIAGNOSIS — D42.0 TERATOMA OF UNCERTAIN BEHAVIOR OF CEREBRAL MENINGES (HCC): ICD-10-CM

## 2018-04-30 DIAGNOSIS — R35.0 URINARY FREQUENCY: Primary | ICD-10-CM

## 2018-04-30 PROCEDURE — A9585 GADOBUTROL INJECTION: HCPCS | Performed by: NEUROLOGICAL SURGERY

## 2018-04-30 PROCEDURE — 70553 MRI BRAIN STEM W/O & W/DYE: CPT

## 2018-04-30 RX ADMIN — GADOBUTROL 6 ML: 604.72 INJECTION INTRAVENOUS at 20:28

## 2018-04-30 NOTE — TELEPHONE ENCOUNTER
Patient tried samples  Of Myrbetriq 50mg    Medication is working and would like script sent to [facility] in [place]

## 2018-05-22 ENCOUNTER — OFFICE VISIT (OUTPATIENT)
Dept: NEUROSURGERY | Facility: CLINIC | Age: 63
End: 2018-05-22
Payer: COMMERCIAL

## 2018-05-22 ENCOUNTER — TRANSCRIBE ORDERS (OUTPATIENT)
Dept: NEUROSURGERY | Facility: CLINIC | Age: 63
End: 2018-05-22

## 2018-05-22 VITALS
RESPIRATION RATE: 16 BRPM | DIASTOLIC BLOOD PRESSURE: 62 MMHG | BODY MASS INDEX: 29.23 KG/M2 | TEMPERATURE: 96.9 F | SYSTOLIC BLOOD PRESSURE: 126 MMHG | WEIGHT: 145 LBS | HEIGHT: 59 IN | HEART RATE: 67 BPM

## 2018-05-22 DIAGNOSIS — R53.83 LETHARGIC: ICD-10-CM

## 2018-05-22 DIAGNOSIS — Z01.818 PRE-PROCEDURAL EXAMINATION: Primary | ICD-10-CM

## 2018-05-22 DIAGNOSIS — D42.0 ATYPICAL INTRACRANIAL MENINGIOMA (HCC): Primary | ICD-10-CM

## 2018-05-22 PROCEDURE — 99214 OFFICE O/P EST MOD 30 MIN: CPT | Performed by: NEUROLOGICAL SURGERY

## 2018-05-22 NOTE — PROGRESS NOTES
Neurosurgery Office Note  Jany Soriano is a 63 y o  female    Type of Visit: Follow-up      ASSESSMENT / PLAN  Diagnoses and all orders for this visit:    Atypical intracranial meningioma ([facility] [address] )  -     TSH, 3rd generation with T4 reflex; Future  -     ACTH; Future  -     Cortisol Level, AM Specimen; Future  -     MRI brain with and without contrast; Future    Lethargic    Other orders  -     Ascorbic Acid (VITAMIN C GUMMIES PO); Take by mouth 2 gummies a day          DISCUSSION SUMMARY  This is a 82-year-old female status post resection of a grade 2 meningioma was no sign of recurrence on today's MRI  She also is status post radiation to this region  She complains of being lethargic  Occasionally this can be from a hypopituitarism affect of the radiation  It is for these reasons ACTH, cortisol, TSH and reflex T4 are indicated  We will plan on getting the labs now and a repeat imaging study in 6 months time  The be our plan to obtain MRIs every 6 months for the 1st 5 years and then yearly or less frequently after that time  CHIEF COMPLAINT  Patient presents for 6 month follow up with MRI brain s/p XRT 6/26/15- 8/7/15 and Image-guided right frontotemporal craniotomy for resection of the large meningioma with dural reconstruction on 5/6/15 for 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)  Diagnoses:   Atypical intracranial meningioma    SX INFO  - 5/6/15 (DKO) Image-guided right frontotemporal craniotomy for resection of the large meningioma with dural reconstruction  o Final Path Dx: 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)  HISTORY OF PRESENT ILLNESS  She denies headaches  Her chief complaint is really that of being very tired  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  She says that she is fearful that she will fall sleep 1 driving a car  She is not driving because of this    She has difficulty with sleep-wake cycles she feels that she is gaining weight  Her concentration level is decreased also  She is status post resection of a anaplastic meningioma with postoperative x-ray therapy  She has noted some minimal hair loss  REVIEW OF SYSTEMS  Review of Systems   Constitutional: Negative  HENT: Negative  Eyes:        Light sensitivity   Respiratory: Negative  Cardiovascular: Negative  Gastrointestinal: Negative  Endocrine: Negative  Genitourinary: Negative  Musculoskeletal: Negative  Skin: Negative  Allergic/Immunologic: Negative  Neurological: Negative  Hematological: Negative  Psychiatric/Behavioral: Negative  All other systems reviewed and are negative  The patient's ROS was reviewed by MD     Active Ambulatory Problems     Diagnosis Date Noted    No Active Ambulatory Problems     Resolved Ambulatory Problems     Diagnosis Date Noted    No Resolved Ambulatory Problems     Past Medical History:   Diagnosis Date    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[address] )     GERD (gastroesophageal reflux disease)     History of arteriovenous malformation     Liver problem        Past Surgical History:   Procedure Laterality Date    BRAIN SURGERY      CRANIOTOMY      LIVER SURGERY      adenoma removed from liver    OR COLONOSCOPY FLX DX W/COLLJ SPEC WHEN PFRMD N/A 3/20/2017    Procedure: EGD AND COLONOSCOPY;  Surgeon: Aaron Campos MD;  Location: [facility];   Service: Gastroenterology    TUBAL LIGATION         History   Smoking Status    Former Smoker    Quit date: 1999   Smokeless Tobacco    Never Used       History   Alcohol Use    Yes     Comment: occasional       History   Drug use: Unknown       Vitals:    05/22/18 1417   BP: 126/62   BP Location: Left arm   Patient Position: Sitting   Cuff Size: Adult   Pulse: 67   Resp: 16   Temp: (!) 96 9 °F (36 1 °C)   TempSrc: Tympanic   Weight: 65 8 kg (145 lb)   Height: 4' 11" (1 499 m)         Current Outpatient Prescriptions:     acetaminophen (TYLENOL) 500 mg tablet, Take 1 tablet by mouth, Disp: , Rfl:     Ascorbic Acid (VITAMIN C GUMMIES PO), Take by mouth 2 gummies a day, Disp: , Rfl:     B Complex-C CAPS, Take 1 capsule by mouth daily, Disp: , Rfl:     Biotin 1 MG CAPS, Take 1 capsule by mouth daily, Disp: , Rfl:     ibuprofen (MOTRIN) 200 mg tablet, Take 200 mg by mouth every 6 (six) hours as needed for mild pain, Disp: , Rfl:     Mirabegron ER 50 MG TB24, Take 1 tablet (50 mg total) by mouth daily, Disp: 30 tablet, Rfl: 11    Multiple Vitamin (DAILY VALUE MULTIVITAMIN) TABS, Take 1 tablet by mouth daily, Disp: , Rfl:     ranitidine (ZANTAC) 150 MG capsule, Take by mouth, Disp: , Rfl:     The following portions of the patient's history were reviewed by MD and updated by MA as appropriate: allergies, current medications, past family history, past medical history, past social history, past surgical history and problem list       Physical Exam  She is awake but somnolent  Her power is 5/5 bilaterally  Sensation is intact  Her facial expression is intact in grimace and at rest  Her tongue protrudes in the midline  She has no drift  Her power is 5/5 bilaterally  Her station and gait are normal but she appears to be slumping and dragging        RESULTS/DATA  MRI of the brain is carefully reviewed and compared with previous studies  There is a region of scar but this is unchanged comparison to the previous study and does not appear to be tumor  There is no sign of recurrence on the study    Below is comparison of the preoperative and postoperative MRI taken at approximately the same plane of imaging

## 2018-05-25 ENCOUNTER — APPOINTMENT (OUTPATIENT)
Dept: LAB | Facility: HOSPITAL | Age: 63
End: 2018-05-25
Attending: NEUROLOGICAL SURGERY
Payer: COMMERCIAL

## 2018-05-25 ENCOUNTER — TRANSCRIBE ORDERS (OUTPATIENT)
Dept: ADMINISTRATIVE | Facility: HOSPITAL | Age: 63
End: 2018-05-25

## 2018-05-25 DIAGNOSIS — D42.0 TERATOMA OF UNCERTAIN BEHAVIOR OF CEREBRAL MENINGES (HCC): ICD-10-CM

## 2018-05-25 DIAGNOSIS — D42.0 ATYPICAL INTRACRANIAL MENINGIOMA (HCC): ICD-10-CM

## 2018-05-25 DIAGNOSIS — D42.0 TERATOMA OF UNCERTAIN BEHAVIOR OF CEREBRAL MENINGES (HCC): Primary | ICD-10-CM

## 2018-05-25 LAB
CORTIS AM PEAK SERPL-MCNC: 10.9 UG/DL (ref 4.2–22.4)
TSH SERPL DL<=0.05 MIU/L-ACNC: 1.18 UIU/ML (ref 0.36–3.74)

## 2018-05-25 PROCEDURE — 36415 COLL VENOUS BLD VENIPUNCTURE: CPT

## 2018-05-25 PROCEDURE — 82533 TOTAL CORTISOL: CPT

## 2018-05-25 PROCEDURE — 84443 ASSAY THYROID STIM HORMONE: CPT

## 2018-05-25 PROCEDURE — 82024 ASSAY OF ACTH: CPT

## 2018-05-26 LAB — ACTH PLAS-MCNC: 9.1 PG/ML (ref 7.2–63.3)

## 2018-06-01 ENCOUNTER — TELEPHONE (OUTPATIENT)
Dept: NEUROSURGERY | Facility: CLINIC | Age: 63
End: 2018-06-01

## 2018-06-01 NOTE — TELEPHONE ENCOUNTER
----- Message from Palmira Parker MD sent at 6/1/2018  1:12 PM EDT -----  Regarding: FW: Blood Work  Please call to let her know that these levels are normal    Thank you  ----- Message -----  From: Rita Matthew  Sent: 5/30/2018   1:24 PM  To: Marilee Narayanan MD  Subject: Blood Work                                       TSH 3RD GENERATON (Ref: 0 358 - 3 740 uIU/mL)   1 177     ACTH (Ref: 7 2 - 63 3 pg/mL) 9 1     Cortisol - AM (Ref: 4 2 - 22 4 ug/dL)  10 9       ----- Message -----  From: Rita Matthew  Sent: 5/22/2018   3:05 PM  To: Rita Matthew    Patient to have the following:  - ACTH   - Cortisol Level, AM Specimen   - TSH, 3rd generation with T4 reflex     Once completed must be reviewed by Dr Micah Chavez and call patient with results

## 2018-06-21 ENCOUNTER — RADIATION ONCOLOGY FOLLOW-UP (OUTPATIENT)
Dept: RADIATION ONCOLOGY | Facility: HOSPITAL | Age: 63
End: 2018-06-21
Attending: RADIOLOGY
Payer: COMMERCIAL

## 2018-06-21 ENCOUNTER — CLINICAL SUPPORT (OUTPATIENT)
Dept: RADIATION ONCOLOGY | Facility: HOSPITAL | Age: 63
End: 2018-06-21
Payer: COMMERCIAL

## 2018-06-21 VITALS
WEIGHT: 145 LBS | HEIGHT: 59 IN | HEART RATE: 65 BPM | TEMPERATURE: 97.1 F | SYSTOLIC BLOOD PRESSURE: 110 MMHG | RESPIRATION RATE: 16 BRPM | OXYGEN SATURATION: 98 % | BODY MASS INDEX: 29.23 KG/M2 | DIASTOLIC BLOOD PRESSURE: 60 MMHG

## 2018-06-21 DIAGNOSIS — D42.0 ATYPICAL INTRACRANIAL MENINGIOMA (HCC): Primary | ICD-10-CM

## 2018-06-21 PROCEDURE — G0463 HOSPITAL OUTPT CLINIC VISIT: HCPCS | Performed by: RADIOLOGY

## 2018-06-21 PROCEDURE — 99215 OFFICE O/P EST HI 40 MIN: CPT | Performed by: RADIOLOGY

## 2018-06-21 NOTE — PROGRESS NOTES
Follow-up - Radiation Oncology   Shubham Underwood 1955 63 y o  female [id number]      History of Present Illness   Cancer Staging  No matching staging information was found for the patient  Shubham Underwood is a 63y o  year old female with prior history of a small AVM treated with gamma knife at [facility] in 2006  She has had headaches for the past few months  Due to progressive headache she was seen at [address] [place] [address]  CT scan from 5/3/15 revealed a 3 cm superficial right frontal mass with significant vasogenic edema and mass effect  underwent resection of presumed meningioma on 5/6/15  The pathology returned consistent with atypical meningioma WHO grade 2  This pathology was confirmed at [facility]  There were features including Ki-67 elevated labeling index  It was felt that the proliferative activity indicated an increased risk of tumor recurrence  MRI from 5/26/15 revealed deep and medial heterogeneous enhancement of the underlying brain parenchyma which appears more irregular than previous exam suggesting residual parenchymal tumor  completed a course of adjuvant radiation therapy for atypical meningioma status post resection on 8/7/2015          Interval History:    Last seen in follow up on 12/7/17 4/5/18 Negative cystoscopy by Dr Avila Minor (bladder cancer)     4/30/18 MRI brain  IMPRESSION:  Stable MR appearance of the brain, there is no evidence for recurrent right frontal extra-axial mass      5/22/18 Dr Bayron Culver  She complains of being lethargic   Occasionally this can be from a hypopituitarism affect of the radiation  Delrae Carbon is for these reasons ACTH, cortisol, TSH and reflex T4 are indicated      5/25/18 Above ordered labs WNL     11/19/18 MRI brain scheduled     11/27/18 Returns to Dr Bayron Culver        Screening  Tobacco  Current tobacco user: no  If yes, brief counseling provided: NA     Hypertension  Hypertension screening performed: yes  Normotensive:  yes  If no, referred to PCP: n/a     Depression Screening  Screened for depression using PHQ-2: yes     Screened for depression using PHQ-9:  no  Screening positive or negative:  positive  If score >4, was any of the following actions taken? Additional evaluation for depression, suicide risk assesment, referral to PCP or psychiatry, medication started:  N/a;  Questioned about seeing Isauro December but denied      Advanced Care Planning for Patients >65 years  Advanced Care Planning Discussed:  no  Patient named surrogate decision maker or care plan in chart: no       Historical Information      Atypical intracranial meningioma ([facility] )    5/6/2015 Initial Diagnosis     Atypical intracranial meningioma ([facility] )         5/6/2015 Surgery     Right frontotemporal craniotomy for resection of large convexity meningioma by Dr Vasiliy Lora  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),          6/26/2015 - 8/7/2015 Radiation     Right frontal region received 5400 cGy Dr Redd Harding            Past Medical History:   Diagnosis Date    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     GERD (gastroesophageal reflux disease)     History of arteriovenous malformation     Liver problem      Past Surgical History:   Procedure Laterality Date    BRAIN SURGERY      CRANIOTOMY      LIVER SURGERY      adenoma removed from liver    ID COLONOSCOPY FLX DX W/COLLJ SPEC WHEN PFRMD N/A 3/20/2017    Procedure: EGD AND COLONOSCOPY;  Surgeon: Akshat Rosado MD;  Location: [facility];   Service: Gastroenterology    TUBAL LIGATION         Social History   History   Alcohol Use    Yes     Comment: occasional     History   Drug use: Unknown     History   Smoking Status    Former Smoker    Quit date: 1999   Smokeless Tobacco    Never Used         Meds/Allergies     Current Outpatient Prescriptions:     acetaminophen (TYLENOL) 500 mg tablet, Take 1 tablet by mouth, Disp: , Rfl:     Ascorbic Acid (VITAMIN C GUMMIES PO), Take by mouth 2 gummies a day, Disp: , Rfl:     B Complex-C CAPS, Take 1 capsule by mouth daily, Disp: , Rfl:     Biotin 1 MG CAPS, Take 1 capsule by mouth daily, Disp: , Rfl:     CALCIUM-MAGNESIUM-ZINC PO, Take by mouth daily, Disp: , Rfl:     ibuprofen (MOTRIN) 200 mg tablet, Take 200 mg by mouth every 6 (six) hours as needed for mild pain, Disp: , Rfl:     Mirabegron ER 50 MG TB24, Take 1 tablet (50 mg total) by mouth daily, Disp: 30 tablet, Rfl: 11    Multiple Vitamin (DAILY VALUE MULTIVITAMIN) TABS, Take 1 tablet by mouth daily, Disp: , Rfl:     ranitidine (ZANTAC) 150 MG capsule, Take by mouth, Disp: , Rfl:   Allergies   Allergen Reactions    Other      IV dye           Review of Systems    Constitutional: Negative  HENT: Negative  Eyes: Positive for visual disturbance  Respiratory: Negative  Cardiovascular: Negative  Gastrointestinal: Negative  Endocrine: Negative  Genitourinary: Positive for urgency  Musculoskeletal: Positive for arthralgias and myalgias  Skin: Negative  Allergic/Immunologic: Negative  Neurological: Negative  Hematological: Negative  Psychiatric/Behavioral: Negative          OBJECTIVE:   /60   Pulse 65   Temp (!) 97 1 °F (36 2 °C)   Resp 16   Ht 4' 11" (1 499 m)   Wt 65 8 kg (145 lb)   SpO2 98%   BMI 29 29 kg/m²   Pain Assessment:  0  ECOG/Zubrod/WHO: 0 - Asymptomatic    Physical Exam   Constitutional: She appears well-developed  HENT:   Head: Normocephalic  Hair is normal    Mouth/Throat: Oropharynx is clear and moist    There is an area of focal alopecia in the right frontotemporal region   Eyes: EOM are normal  No scleral icterus  Neck: Neck supple  No spinous process tenderness present  No edema present  Cardiovascular: Normal rate and regular rhythm  Pulmonary/Chest: Effort normal and breath sounds normal  No respiratory distress  She has no wheezes  She exhibits no tenderness  Abdominal: Soft  Bowel sounds are normal  She exhibits no distension, no ascites and no mass   There is no hepatosplenomegaly  There is no tenderness  There is no rebound  Genitourinary: No breast swelling or tenderness  Musculoskeletal: Normal range of motion  She exhibits no edema or tenderness  Her muscle strength symmetric bilaterally  She is ambulating independently without any signs of imbalance   Lymphadenopathy:     She has no cervical adenopathy  She has no axillary adenopathy  Neurological: She is alert  She has normal strength  No cranial nerve deficit  Coordination and gait normal    Conversing appropriately   Skin: Skin is intact  Psychiatric: She has a normal mood and affect  Her speech is normal and behavior is normal             RESULTS    Lab Results:   Recent Results (from the past 672 hour(s))   TSH, 3rd generation with T4 reflex    Collection Time: 05/25/18  7:15 AM   Result Value Ref Range    TSH 3RD GENERATON 1 177 0 358 - 3 740 uIU/mL   ACTH    Collection Time: 05/25/18  7:15 AM   Result Value Ref Range    ACTH 9 1 7 2 - 63 3 pg/mL   Cortisol Level, AM Specimen    Collection Time: 05/25/18  7:15 AM   Result Value Ref Range    Cortisol - AM 10 9 4 2 - 22 4 ug/dL       Imaging Studies:No results found  Assessment/Plan:  No orders of the defined types were placed in this encounter  Jamaica Jason is a 63y o  year old female with atypical meningioma status post resection and adjuvant radiation  She is 3 years post adjuvant radiation therapy which she has tolerated well  Her most recent MRI of the brain from 4/30/2018 returned stable  She did have fatigue and underwent blood work panel including TSH and cortisol all of which returned normal limits  She remains active working a strenuous job  She will return for follow-up visit in 1 year and has a follow-up MRI scheduled in between        Claudeen Bruce, MD  6/21/2018,9:07 AM    Portions of the record may have been created with voice recognition software   Occasional wrong word or "sound a like" substitutions may have occurred due to the inherent limitations of voice recognition software   Read the chart carefully and recognize, using context, where substitutions have occurred

## 2018-06-21 NOTE — PROGRESS NOTES
Parks Seip  1955   Ms Tu Flores is a 63 y o  female       Chief Complaint   Patient presents with    Brain Tumor    Follow-up       Cancer Staging  No matching staging information was found for the patient  Oncology History    Last seen in follow up on 12/7/17 4/5/18 Negative cystoscopy by Dr Braulio Segura (bladder cancer)    4/30/18 MRI brain  IMPRESSION:  Stable MR appearance of the brain, there is no evidence for recurrent right frontal extra-axial mass  5/22/18 Dr Gloria Ochoa  She complains of being lethargic  Occasionally this can be from a hypopituitarism affect of the radiation  It is for these reasons ACTH, cortisol, TSH and reflex T4 are indicated  5/25/18 Above ordered labs WNL    11/19/18 MRI brain scheduled    11/27/18 Returns to Dr Gloria Ochoa            Atypical intracranial meningioma [facility])    5/6/2015 Initial Diagnosis     Atypical intracranial meningioma ([address] )         5/6/2015 Surgery     Right frontotemporal craniotomy for resection of large convexity meningioma by Dr Gloria Ochoa  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),          6/26/2015 - 8/7/2015 Radiation     Right frontal region received 5400 cGy Dr Lucina Simental Trial: no    Interval History   Last seen in follow up on 12/7/17 4/5/18 Negative cystoscopy by Dr Braulio Segura (bladder cancer)    4/30/18 MRI brain  IMPRESSION:  Stable MR appearance of the brain, there is no evidence for recurrent right frontal extra-axial mass  5/22/18 Dr Gloria Ochoa  She complains of being lethargic  Occasionally this can be from a hypopituitarism affect of the radiation  It is for these reasons ACTH, cortisol, TSH and reflex T4 are indicated      5/25/18 Above ordered labs WNL    11/19/18 MRI brain scheduled    11/27/18 Returns to Dr Gloria Ochoa      Screening  Tobacco  Current tobacco user: no  If yes, brief counseling provided: NA    Hypertension  Hypertension screening performed: yes  Normotensive:  yes  If no, referred to PCP: n/a    Depression Screening  Screened for depression using PHQ-2: yes    Screened for depression using PHQ-9:  no  Screening positive or negative:  positive  If score >4, was any of the following actions taken? Additional evaluation for depression, suicide risk assesment, referral to PCP or psychiatry, medication started:  N/a;  Questioned about seeing [employer] but denied  Advanced Care Planning for Patients >65 years  Advanced Care Planning Discussed:  no  Patient named surrogate decision maker or care plan in chart: no      Health Maintenance   Topic Date Due    HIV SCREENING  1955    Depression Screening PHQ-9  1955    PNEUMOCOCCAL POLYSACCHARIDE VACCINE X 2 AGE 11-64 YEARS IMMUNOCOMPROMISED (1) 12/19/1968    DTaP,Tdap,and Td Vaccines (1 - Tdap) 12/19/1978    INFLUENZA VACCINE  09/01/2018    CRC Screening: Colonoscopy  03/20/2027    Hepatitis C Screening  Completed       Patient Active Problem List   Diagnosis    [facility]    Lethargic     Past Medical History:   Diagnosis Date    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     GERD (gastroesophageal reflux disease)     History of arteriovenous malformation     Liver problem      Past Surgical History:   Procedure Laterality Date    BRAIN SURGERY      CRANIOTOMY      LIVER SURGERY      adenoma removed from liver    [place] COLONOSCOPY FLX DX W/COLLJ SPEC WHEN PFRMD N/A 3/20/2017    Procedure: EGD AND COLONOSCOPY;  Surgeon: Arpita Cason MD;  Location: [facility];   Service: Gastroenterology    TUBAL LIGATION       Family History   Problem Relation Age of Onset    Brain cancer Father     Lung cancer Mother     Melanoma Sister     Lung cancer Brother     Ovarian cancer Maternal Grandmother     Breast cancer Paternal Grandmother     Prostate cancer Brother      Social History     Social History    Marital status: /Civil Union     Spouse name: N/A    Number of children: N/A    Years of education: N/A     Occupational History    Not on file  Social History Main Topics    Smoking status: Former Smoker     Quit date: 1999    Smokeless tobacco: Never Used    Alcohol use Yes      Comment: occasional    Drug use: Unknown    Sexual activity: Not on file     Other Topics Concern    Not on file     Social History Narrative    No narrative on file       Current Outpatient Prescriptions:     acetaminophen (TYLENOL) 500 mg tablet, Take 1 tablet by mouth, Disp: , Rfl:     Ascorbic Acid (VITAMIN C GUMMIES PO), Take by mouth 2 gummies a day, Disp: , Rfl:     B Complex-C CAPS, Take 1 capsule by mouth daily, Disp: , Rfl:     Biotin 1 MG CAPS, Take 1 capsule by mouth daily, Disp: , Rfl:     CALCIUM-MAGNESIUM-ZINC PO, Take by mouth daily, Disp: , Rfl:     ibuprofen (MOTRIN) 200 mg tablet, Take 200 mg by mouth every 6 (six) hours as needed for mild pain, Disp: , Rfl:     Mirabegron ER 50 MG TB24, Take 1 tablet (50 mg total) by mouth daily, Disp: 30 tablet, Rfl: 11    Multiple Vitamin (DAILY VALUE MULTIVITAMIN) TABS, Take 1 tablet by mouth daily, Disp: , Rfl:     ranitidine (ZANTAC) 150 MG capsule, Take by mouth, Disp: , Rfl:   Allergies   Allergen Reactions    Other      IV dye         Review of Systems:  Review of Systems   Constitutional: Negative  HENT: Negative  Eyes: Positive for visual disturbance  Respiratory: Negative  Cardiovascular: Negative  Gastrointestinal: Negative  Endocrine: Negative  Genitourinary: Positive for urgency  Musculoskeletal: Positive for arthralgias and myalgias  Skin: Negative  Allergic/Immunologic: Negative  Neurological: Negative  Hematological: Negative  Psychiatric/Behavioral: Negative  Vitals:    06/21/18 0829   BP: 110/60   Pulse: 65   Resp: 16   Temp: (!) 97 1 °F (36 2 °C)   SpO2: 98%   Weight: 65 8 kg (145 lb)   Height: 4' 11" (1 499 m)       Pain Score: 0-No pain    Imaging:No results found      Teaching

## 2018-08-20 ENCOUNTER — OFFICE VISIT (OUTPATIENT)
Dept: OBGYN CLINIC | Facility: CLINIC | Age: 63
End: 2018-08-20
Payer: COMMERCIAL

## 2018-08-20 VITALS
SYSTOLIC BLOOD PRESSURE: 124 MMHG | DIASTOLIC BLOOD PRESSURE: 68 MMHG | BODY MASS INDEX: 28.83 KG/M2 | HEIGHT: 59 IN | WEIGHT: 143 LBS

## 2018-08-20 DIAGNOSIS — E28.39 HYPOESTROGENISM: ICD-10-CM

## 2018-08-20 DIAGNOSIS — N63.0 BREAST LUMP IN FEMALE: ICD-10-CM

## 2018-08-20 DIAGNOSIS — Z01.419 ENCOUNTER FOR GYNECOLOGICAL EXAMINATION WITHOUT ABNORMAL FINDING: Primary | ICD-10-CM

## 2018-08-20 PROBLEM — K21.9 GERD (GASTROESOPHAGEAL REFLUX DISEASE): Status: ACTIVE | Noted: 2017-02-08

## 2018-08-20 PROCEDURE — S0610 ANNUAL GYNECOLOGICAL EXAMINA: HCPCS | Performed by: PHYSICIAN ASSISTANT

## 2018-08-20 NOTE — PATIENT INSTRUCTIONS
Pt does f/w Dr Nita Burciaga (urology) for bladder cancer  Had removal done 8/2015, chemo done as well  Advised pt to establish herself w dermatology  Pt has multiple moles and areas with irregular borders and colorings that should be evaluated by dermatology  Pt will establish herself  Pt advised trial of coconut oil for vaginal atrophy  Plans to establish PCP in [address] [place] [address] as her current PCP is in [facility]  Will plan mammo and US due to right breast fullness and DEXA as well

## 2018-08-20 NOTE — PROGRESS NOTES
Assessment/Plan   Diagnoses and all orders for this visit:    Encounter for gynecological examination without abnormal finding    Other orders  -     GP Liquid-Based Pap + HPV Plus  -     Cancel: Mammo screening bilateral w 3d & cad; Future  -     Discontinue: Mirabegron (MYRBETRIQ PO); Take by mouth        Discussion    All questions have been answered to her satisfaction  RTO for APE or sooner if needed      Subjective     LMP approx 8 years ago, had few hot flashes but relatively tolerable  Never on HRT  Does have some current vaginal dryness-advised coconut oil  Pt hesitant to use anything hormonal  Pt  1 SAB 2 VIP  Had abnormal pap approx 30 years ago, had repeated and was normal at that time  Never had colpo or LEEP  Mammos have been abnormal in the past-has breast cyst removed in her 25s, was benign, WNL since then  Chu Medrano is a 63 y o  female who presents for annual well woman exam    LMP - ; Denies  bleeding  No vulvar itch/burn; No vaginal itch/burn; No abn discharge or odor; No urinary sx - burning/pain/frequency/hematuria  (+) SBEs - no breast masses, asymmetry, nipple discharge or bleeding, changes in skin of breast, or breast tenderness bilaterally  No abd/pelvic pain or HAs; No menopausal symptoms: No hot flashes/night sweats, sleeping well  Pt is sexually active in a mutually monog/ sexual relationship; She declines std/hiv/hep testing; Feels safe at home    (+) PCP for routine Bw/care; Last Pap - ?? History of abnormal Pap smear: years ago-no bx ever  Last mammo - 2016  History of abnormal mammogram: years ago  Last colonoscopy - 2016  Last Dexa scan - none yet    Review of Systems   Constitutional: Positive for fatigue  Negative for fever and unexpected weight change  HENT: Positive for dental problem  Negative for mouth sores, nosebleeds, rhinorrhea, sinus pain, sinus pressure and sore throat      Eyes: Negative for pain, discharge and visual disturbance  Respiratory: Negative for cough, chest tightness, shortness of breath and wheezing  Cardiovascular: Negative for chest pain, palpitations and leg swelling  Gastrointestinal: Positive for constipation  Negative for blood in stool, diarrhea, nausea and vomiting  Endocrine: Negative for polydipsia  Genitourinary: Positive for frequency  Negative for difficulty urinating, dyspareunia, dysuria, menstrual problem, pelvic pain, urgency, vaginal discharge and vaginal pain  Musculoskeletal: Positive for arthralgias and back pain  Negative for joint swelling  Allergic/Immunologic: Negative for environmental allergies  Neurological: Positive for headaches  Negative for seizures and light-headedness  Hematological: Bruises/bleeds easily  Psychiatric/Behavioral: Negative for sleep disturbance  The patient is not nervous/anxious  The following portions of the patient's history were reviewed and updated as appropriate: current medications, past family history, past medical history, past social history, past surgical history and problem list                                                           Period Cycle (Days):  (LMP )    OB History      Para Term  AB Living    7 4     2      SAB TAB Ectopic Multiple Live Births    1       4          Past Medical History:   Diagnosis Date    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[address] )     GERD (gastroesophageal reflux disease)     History of arteriovenous malformation     Liver problem        Past Surgical History:   Procedure Laterality Date    BRAIN SURGERY      CRANIOTOMY      LIVER SURGERY      adenoma removed from liver    [place] COLONOSCOPY FLX DX W/COLLJ SPEC WHEN PFRMD N/A 3/20/2017    Procedure: EGD AND COLONOSCOPY;  Surgeon: Delisa Barrera MD;  Location: [facility];   Service: Gastroenterology    TUBAL LIGATION         Family History   Problem Relation Age of Onset    Brain cancer Father     Lung cancer Mother     Melanoma Sister     Lung cancer Brother     Ovarian cancer Maternal Grandmother     Breast cancer Paternal Grandmother     Prostate cancer Brother        Social History     Social History    Marital status: /Civil Union     Spouse name: N/A    Number of children: N/A    Years of education: N/A     Occupational History    Not on file  Social History Main Topics    Smoking status: Former Smoker     Quit date: 1999    Smokeless tobacco: Never Used    Alcohol use Yes      Comment: occasional    Drug use: No    Sexual activity: Yes     Partners: Male     Birth control/ protection: Post-menopausal, Female Sterilization     Other Topics Concern    Not on file     Social History Narrative    No narrative on file         Current Outpatient Prescriptions:     acetaminophen (TYLENOL) 500 mg tablet, Take 1 tablet by mouth, Disp: , Rfl:     Ascorbic Acid (VITAMIN C GUMMIES PO), Take by mouth 2 gummies a day, Disp: , Rfl:     B Complex-C CAPS, Take 1 capsule by mouth daily, Disp: , Rfl:     Biotin 1 MG CAPS, Take 1 capsule by mouth daily, Disp: , Rfl:     CALCIUM-MAGNESIUM-ZINC PO, Take by mouth daily, Disp: , Rfl:     ibuprofen (MOTRIN) 200 mg tablet, Take 200 mg by mouth every 6 (six) hours as needed for mild pain, Disp: , Rfl:     Mirabegron ER 50 MG TB24, Take 1 tablet (50 mg total) by mouth daily, Disp: 30 tablet, Rfl: 11    Multiple Vitamin (DAILY VALUE MULTIVITAMIN) TABS, Take 1 tablet by mouth daily, Disp: , Rfl:     ranitidine (ZANTAC) 150 MG capsule, Take by mouth, Disp: , Rfl:     Allergies   Allergen Reactions    Other      IV dye         Objective   Vitals:    08/20/18 1317   BP: 124/68   BP Location: Left arm   Patient Position: Sitting   Cuff Size: Adult   Weight: 64 9 kg (143 lb)   Height: 4' 11" (1 499 m)     Physical Exam   Constitutional: She is oriented to person, place, and time  She appears well-developed and well-nourished  HENT:   Head: Normocephalic and atraumatic  Cardiovascular: Normal rate and regular rhythm  Pulmonary/Chest: Effort normal and breath sounds normal  Right breast exhibits mass (with thickened area right breast in UOQ, no distinct masses or lumps noted, but overall thickened prominent tissue)  Right breast exhibits no inverted nipple, no nipple discharge, no skin change and no tenderness  Left breast exhibits no inverted nipple, no mass, no nipple discharge, no skin change and no tenderness  Breasts are symmetrical    Abdominal: Soft  She exhibits no distension and no mass  There is no rebound and no guarding  Neurological: She is alert and oriented to person, place, and time  Skin: Skin is warm and dry  Lesion (mulirple irregularly shaped and colored lesions noted) noted  Psychiatric: She has a normal mood and affect  Patient Instructions   Pt does f/w Dr Abundio Arroyo (urology) for bladder cancer  Had removal done 8/2015, chemo done as well  Advised pt to establish herself w dermatology  Pt has multiple moles and areas with irregular borders and colorings that should be evaluated by dermatology  Pt will establish herself  Pt advised trial of coconut oil for vaginal atrophy  Plans to establish PCP in [address] [place] [address] as her current PCP is in [facility]  Will plan mammo and US due to right breast fullness and DEXA as well

## 2018-08-24 LAB
HPV HR 12 DNA CVX QL NAA+PROBE: DETECTED
HPV16 DNA SPEC QL NAA+PROBE: NOT DETECTED
HPV18 DNA SPEC QL NAA+PROBE: NOT DETECTED
THIN PREP CVX: ABNORMAL

## 2018-08-27 ENCOUNTER — TELEPHONE (OUTPATIENT)
Dept: OBGYN CLINIC | Facility: CLINIC | Age: 63
End: 2018-08-27

## 2018-09-17 ENCOUNTER — HOSPITAL ENCOUNTER (OUTPATIENT)
Dept: RADIOLOGY | Facility: HOSPITAL | Age: 63
Discharge: HOME | End: 2018-09-17
Payer: COMMERCIAL

## 2018-09-17 DIAGNOSIS — N63.0 BREAST LUMP IN FEMALE: ICD-10-CM

## 2018-09-17 DIAGNOSIS — E28.39 HYPOESTROGENISM: ICD-10-CM

## 2018-09-17 PROCEDURE — G0279 TOMOSYNTHESIS, MAMMO: HCPCS

## 2018-09-17 PROCEDURE — 77080 DXA BONE DENSITY AXIAL: CPT

## 2018-09-17 PROCEDURE — 77066 DX MAMMO INCL CAD BI: CPT

## 2018-10-11 ENCOUNTER — PROCEDURE VISIT (OUTPATIENT)
Dept: UROLOGY | Facility: AMBULATORY SURGERY CENTER | Age: 63
End: 2018-10-11
Payer: COMMERCIAL

## 2018-10-11 VITALS
WEIGHT: 144 LBS | SYSTOLIC BLOOD PRESSURE: 128 MMHG | DIASTOLIC BLOOD PRESSURE: 68 MMHG | HEIGHT: 59 IN | HEART RATE: 69 BPM | BODY MASS INDEX: 29.03 KG/M2

## 2018-10-11 DIAGNOSIS — C67.9 ADENOCARCINOMA OF BLADDER (HCC): Primary | ICD-10-CM

## 2018-10-11 LAB
SL AMB  POCT GLUCOSE, UA: NORMAL
SL AMB LEUKOCYTE ESTERASE,UA: NORMAL
SL AMB POCT BILIRUBIN,UA: NORMAL
SL AMB POCT BLOOD,UA: NORMAL
SL AMB POCT CLARITY,UA: CLEAR
SL AMB POCT COLOR,UA: YELLOW
SL AMB POCT KETONES,UA: NORMAL
SL AMB POCT NITRITE,UA: NORMAL
SL AMB POCT PH,UA: 6
SL AMB POCT SPECIFIC GRAVITY,UA: 1.02
SL AMB POCT URINE PROTEIN: NORMAL

## 2018-10-11 PROCEDURE — 52000 CYSTOURETHROSCOPY: CPT | Performed by: UROLOGY

## 2018-10-11 PROCEDURE — 81002 URINALYSIS NONAUTO W/O SCOPE: CPT | Performed by: UROLOGY

## 2018-10-11 PROCEDURE — 88112 CYTOPATH CELL ENHANCE TECH: CPT | Performed by: PATHOLOGY

## 2018-10-11 NOTE — PROGRESS NOTES
Cystoscopy  Date/Time: 10/11/2018 8:49 AM  Performed by: Aiden Greer  Authorized by: Aiden Greer           Written Consent Obtained  Indications for Procedure:  History of low-grade bladder cancer    Physical Exam    Constitutional   General appearance: No acute distress, well appearing and well nourished  Pulmonary   Respiratory effort: No increased work of breathing or signs of respiratory distress  Cardiovascular   Examination of extremities for edema and/or varicosities: Normal     Abdomen   Abdomen: Non-tender, no masses  Liver and spleen: No hepatomegaly or splenomegaly  Genitourinary   External genitalia and vagina: Normal, no lesions appreciated  Urethra: Normal, no discharge  Bladder: Not distended, no tenderness  Musculoskeletal   Gait and station: Normal     Skin   Skin and subcutaneous tissue: Normal without rashes or lesions  Lymphatic   Palpation of lymph nodes in groin: No lymphadenopathy  Additional Exam: Neuro exam nonfocal           The flexible cystoscope was introduced into the urethra and advanced into the bladder  URETHRA:  Normal,  without strictures or lesions  TRIGONE & UOs:   Normal anatomy with efflux of clear urine  No mucosal lesions or subtrigonal masses  BLADDER MUCOSA:  Normal, without neoplasms or other lesions  DETRUSOR: Normal capacity, without flaccidity, without excessive compliance, without trabeculation or diverticula, without uninhibited bladder contractions on fillings  RETROFLEXED SCOPE VIEW: Normal bladder neck    Plan:  Cystoscopy was negative  She is to continue Myrbetriq  We will now increase her surveillance duration to every year  I will see her 1 year for repeat cystoscopy

## 2018-11-04 LAB
BUN SERPL-MCNC: 19 MG/DL (ref 7–25)
CREAT SERPL-MCNC: 0.82 MG/DL (ref 0.5–0.99)
SL AMB EGFR AFRICAN AMERICAN: 90 ML/MIN/1.73M2
SL AMB EGFR NON AFRICAN AMERICAN: 78 ML/MIN/1.73M2

## 2018-11-19 ENCOUNTER — HOSPITAL ENCOUNTER (OUTPATIENT)
Dept: MRI IMAGING | Facility: HOSPITAL | Age: 63
Discharge: HOME | End: 2018-11-19
Attending: NEUROLOGICAL SURGERY
Payer: COMMERCIAL

## 2018-11-19 DIAGNOSIS — D42.0 ATYPICAL INTRACRANIAL MENINGIOMA (HCC): ICD-10-CM

## 2018-11-19 PROCEDURE — A9585 GADOBUTROL INJECTION: HCPCS | Performed by: NEUROLOGICAL SURGERY

## 2018-11-19 PROCEDURE — 70553 MRI BRAIN STEM W/O & W/DYE: CPT

## 2018-11-19 RX ADMIN — GADOBUTROL 6 ML: 604.72 INJECTION INTRAVENOUS at 20:12

## 2018-11-27 ENCOUNTER — OFFICE VISIT (OUTPATIENT)
Dept: NEUROSURGERY | Facility: CLINIC | Age: 63
End: 2018-11-27
Payer: COMMERCIAL

## 2018-11-27 VITALS
HEIGHT: 59 IN | TEMPERATURE: 97.4 F | RESPIRATION RATE: 16 BRPM | DIASTOLIC BLOOD PRESSURE: 69 MMHG | BODY MASS INDEX: 29.84 KG/M2 | HEART RATE: 70 BPM | SYSTOLIC BLOOD PRESSURE: 145 MMHG | WEIGHT: 148 LBS

## 2018-11-27 DIAGNOSIS — D42.0 ATYPICAL INTRACRANIAL MENINGIOMA (HCC): Primary | ICD-10-CM

## 2018-11-27 DIAGNOSIS — G47.10 HYPERSOMNOLENCE: ICD-10-CM

## 2018-11-27 DIAGNOSIS — R41.3 MEMORY DIFFICULTIES: ICD-10-CM

## 2018-11-27 PROCEDURE — 99214 OFFICE O/P EST MOD 30 MIN: CPT | Performed by: NEUROLOGICAL SURGERY

## 2018-11-27 NOTE — PROGRESS NOTES
Neurosurgery Office Note  Berta Duverney is a 63 y o  female    Type of Visit: Follow-up (6 mo f/u with MRI brain prior)      ASSESSMENT / Rebecca Bernstein was seen today for follow-up  Diagnoses and all orders for this visit:    Atypical intracranial meningioma ([address] )  -     Multiple sleep latency test with diagnostic study; Future  -     CBC and differential; Future  -     Basic metabolic panel; Future  -     Cortisol Level, AM Specimen; Future  -     TSH, 3rd generation with T4 reflex; Future  -     MRI brain with and without contrast; Future    Hypersomnolence    Memory difficulties          DISCUSSION SUMMARY  63-year-old female status post resection of an atypical meningioma who appears somnolent today  She says that she is not getting the rest that she needs  Her  says that she is snoring more at night  Also, we have checked laboratory values in the past however continuing to be vigilant about these is important given the interventions that she has had  These are associated with the possibility of a reduction in hormones over time  Because of the diagnosis of atypical meningioma six-month follow-up is important for the 1st 5 years  She understands that a balanced diet and physical exercise are important to add to her daily activity  I believe that the most likely affect on her memory, is her energy levels and perhaps medications  We had a long talk about memory and how activities can affect memory as well as the delayed effective treatments  I recommended against starting a medication until these more basic explanations for memory difficulties can be sorted out  This is true especially in the setting of hypersomnolence  CHIEF COMPLAINT  Patient presents for initial consult regarding atypical meningioma status post resection      I feel tired in my memory is getting worse      HISTORY OF PRESENT ILLNESS  Previous Surgeries of area:   Craniotomy for resection of tumor  Treatments tried: Surgical resection and postop radiation  She is in general doing well  She does complain about being overly fatigued  This fatigue has increased over time  Her  relates the fact that she snores at night  He is uncertain whether she has sleep apnea or not  She has not had weight gain or weight loss  She describes an overwhelming feeling of being tired  She says that she drifts off to sleep in the middle of conversations  In addition to this she is having increasing difficulties with memory  She says that this is affecting her daily Organization  For instance misplacing her keys or a pass code is a common occurrence in her life  She does not have difficulty with orientation  She asked about starting a medication which could help with her memory  She denies having any seizures  She has periodic headaches which are of low intensity and not severe  Her job performance has not changed  REVIEW OF SYSTEMS  Review of Systems   Constitutional: Positive for fatigue  HENT: Positive for ear pain (random sensations of pain or pressure)  Eyes: Negative  Respiratory: Negative  Cardiovascular: Negative  Gastrointestinal: Negative  Endocrine: Negative  Genitourinary: Negative  Musculoskeletal: Negative  Skin: Negative  Allergic/Immunologic: Negative  Neurological: Positive for light-headedness (recently on and of) and headaches  Hematological: Negative  Psychiatric/Behavioral: Negative          The patient's ROS was reviewed by MD     Active Ambulatory Problems     Diagnosis Date Noted    Atypical intracranial meningioma ([facility] ) 05/22/2018    Lethargic 05/22/2018    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[facility] ) 09/03/2015    GERD (gastroesophageal reflux disease) 02/08/2017    Meningioma ([facility] ) 05/21/2015    Encounter for gynecological examination without abnormal finding 08/20/2018    Hypersomnolence 11/27/2018    Memory difficulties 11/27/2018     Resolved Ambulatory Problems Diagnosis Date Noted    No Resolved Ambulatory Problems     Past Medical History:   Diagnosis Date    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[address] )     GERD (gastroesophageal reflux disease)     History of arteriovenous malformation     Liver problem        Past Surgical History:   Procedure Laterality Date    BRAIN SURGERY      CRANIOTOMY      LIVER SURGERY      adenoma removed from liver    FL COLONOSCOPY FLX DX W/COLLJ SPEC WHEN PFRMD N/A 3/20/2017    Procedure: EGD AND COLONOSCOPY;  Surgeon: Li Morillo MD;  Location: [facility];   Service: Gastroenterology    TUBAL LIGATION         History   Smoking Status    Former Smoker    Quit date: 1999   Smokeless Tobacco    Never Used       History   Alcohol Use    Yes     Comment: occasional       History   Drug Use No       Vitals:    11/27/18 0807   BP: 145/69   BP Location: Left arm   Pulse: 70   Resp: 16   Temp: (!) 97 4 °F (36 3 °C)   TempSrc: Tympanic   Weight: 67 1 kg (148 lb)   Height: 4' 11" (1 499 m)         Current Outpatient Prescriptions:     acetaminophen (TYLENOL) 500 mg tablet, Take 1 tablet by mouth, Disp: , Rfl:     Ascorbic Acid (VITAMIN C GUMMIES PO), Take by mouth 2 gummies a day, Disp: , Rfl:     B Complex-C CAPS, Take 1 capsule by mouth daily, Disp: , Rfl:     Biotin 1 MG CAPS, Take 1 capsule by mouth daily, Disp: , Rfl:     CALCIUM-MAGNESIUM-ZINC PO, Take by mouth daily, Disp: , Rfl:     ibuprofen (MOTRIN) 200 mg tablet, Take 200 mg by mouth every 6 (six) hours as needed for mild pain, Disp: , Rfl:     Mirabegron ER 50 MG TB24, Take 1 tablet (50 mg total) by mouth daily, Disp: 30 tablet, Rfl: 11    Multiple Vitamin (DAILY VALUE MULTIVITAMIN) TABS, Take 1 tablet by mouth daily, Disp: , Rfl:     ranitidine (ZANTAC) 150 MG capsule, Take by mouth, Disp: , Rfl:     The following portions of the patient's history were reviewed by MD and updated by MA as appropriate: allergies, current medications, past family history, past medical history, past social history, past surgical history and problem list       Physical Exam   Constitutional: She is oriented to person, place, and time  She appears well-developed  HENT:   Head: Normocephalic  Eyes: Pupils are equal, round, and reactive to light  EOM are normal  No scleral icterus  Neck: Normal range of motion  Neck supple  No thyromegaly present  Musculoskeletal: Normal range of motion  She exhibits no deformity  Lymphadenopathy:     She has no cervical adenopathy  Neurological: She is oriented to person, place, and time  She has normal reflexes  She displays no atrophy and no tremor  No cranial nerve deficit  She exhibits normal muscle tone  She displays a negative Romberg sign  She displays no seizure activity  Coordination and gait normal  GCS eye subscore is 4  GCS verbal subscore is 5  GCS motor subscore is 6  Reflex Scores:       Tricep reflexes are 2+ on the right side and 2+ on the left side  Bicep reflexes are 2+ on the right side and 2+ on the left side  Patellar reflexes are 2+ on the right side and 2+ on the left side  She is awake but somnolent   Skin: No rash noted  There is erythema  Psychiatric: She has a normal mood and affect  Her behavior is normal  Judgment and thought content normal    Appears to be lethargic  She is more comfortable keeping her eyes closed and open  Her speech is slow, clear and deliver it however her responses worse lower than normal   She claims       RESULTS/DATA    MRI of the brain is carefully reviewed and compared with previous studies  This demonstrates a small amount of enhancement at the base of a tumor resection in the right frontal parietal region  This is unchanged in comparison to previous studies  This likely represents a small area of gliosis rather than residual tumor however it is impossible to absolutely distinguished the 2 from each other  No other major alterations are noted in this study    Specifically the amygdala and hippocampus region appeared to be within normal limits  Gerhardt Sep

## 2018-11-27 NOTE — LETTER
November 27, 2018     [address], [address] [address]    Patient: Maria T Crenshaw   YOB: 1955   Date of Visit: 11/27/2018       Dear Dr Marcos Hager: Thank you for referring Maria T Crenshaw to me for evaluation  Below are my notes for this consultation  If you have questions, please do not hesitate to call me  I look forward to following your patient along with you  Sincerely,        Rosario Keys MD        CC: No Recipients  Rosario Keys MD  11/27/2018  8:56 AM  Sign at close encounter  Neurosurgery Office Note  Maria T Crenshaw is a 63 y o  female    Type of Visit: Follow-up (6 mo f/u with MRI brain prior)      ASSESSMENT / Regina Lewis was seen today for follow-up  Diagnoses and all orders for this visit:    Atypical intracranial meningioma ([facility] )  -     Multiple sleep latency test with diagnostic study; Future  -     CBC and differential; Future  -     Basic metabolic panel; Future  -     Cortisol Level, AM Specimen; Future  -     TSH, 3rd generation with T4 reflex; Future  -     MRI brain with and without contrast; Future    Hypersomnolence    Memory difficulties          DISCUSSION SUMMARY  74-year-old female status post resection of an atypical meningioma who appears somnolent today  She says that she is not getting the rest that she needs  Her  says that she is snoring more at night  Also, we have checked laboratory values in the past however continuing to be vigilant about these is important given the interventions that she has had  These are associated with the possibility of a reduction in hormones over time  Because of the diagnosis of atypical meningioma six-month follow-up is important for the 1st 5 years  She understands that a balanced diet and physical exercise are important to add to her daily activity  I believe that the most likely affect on her memory, is her energy levels and perhaps medications    We had a long talk about memory and how activities can affect memory as well as the delayed effective treatments  I recommended against starting a medication until these more basic explanations for memory difficulties can be sorted out  This is true especially in the setting of hypersomnolence  CHIEF COMPLAINT  Patient presents for initial consult regarding atypical meningioma status post resection  I feel tired in my memory is getting worse      HISTORY OF PRESENT ILLNESS  Previous Surgeries of area:   Craniotomy for resection of tumor  Treatments tried:   Surgical resection and postop radiation  She is in general doing well  She does complain about being overly fatigued  This fatigue has increased over time  Her  relates the fact that she snores at night  He is uncertain whether she has sleep apnea or not  She has not had weight gain or weight loss  She describes an overwhelming feeling of being tired  She says that she drifts off to sleep in the middle of conversations  In addition to this she is having increasing difficulties with memory  She says that this is affecting her daily Organization  For instance misplacing her keys or a pass code is a common occurrence in her life  She does not have difficulty with orientation  She asked about starting a medication which could help with her memory  She denies having any seizures  She has periodic headaches which are of low intensity and not severe  Her job performance has not changed  REVIEW OF SYSTEMS  Review of Systems   Constitutional: Positive for fatigue  HENT: Positive for ear pain (random sensations of pain or pressure)  Eyes: Negative  Respiratory: Negative  Cardiovascular: Negative  Gastrointestinal: Negative  Endocrine: Negative  Genitourinary: Negative  Musculoskeletal: Negative  Skin: Negative  Allergic/Immunologic: Negative  Neurological: Positive for light-headedness (recently on and of) and headaches  Hematological: Negative  Psychiatric/Behavioral: Negative  The patient's ROS was reviewed by MD     Active Ambulatory Problems     Diagnosis Date Noted    Atypical intracranial meningioma ([facility] ) 05/22/2018    Lethargic 05/22/2018    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 ) 09/03/2015    GERD (gastroesophageal reflux disease) 02/08/2017    Meningioma ([facility] ) 05/21/2015    Encounter for gynecological examination without abnormal finding 08/20/2018    Hypersomnolence 11/27/2018    Memory difficulties 11/27/2018     Resolved Ambulatory Problems     Diagnosis Date Noted    No Resolved Ambulatory Problems     Past Medical History:   Diagnosis Date    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[facility] )     GERD (gastroesophageal reflux disease)     History of arteriovenous malformation     Liver problem        Past Surgical History:   Procedure Laterality Date    BRAIN SURGERY      CRANIOTOMY      LIVER SURGERY      adenoma removed from liver    OR COLONOSCOPY FLX DX W/COLLJ SPEC WHEN PFRMD N/A 3/20/2017    Procedure: EGD AND COLONOSCOPY;  Surgeon: Krunal Aparicio MD;  Location: [facility];   Service: Gastroenterology    TUBAL LIGATION         History   Smoking Status    Former Smoker    Quit date: 1999   Smokeless Tobacco    Never Used       History   Alcohol Use    Yes     Comment: occasional       History   Drug Use No       Vitals:    11/27/18 0807   BP: 145/69   BP Location: Left arm   Pulse: 70   Resp: 16   Temp: (!) 97 4 °F (36 3 °C)   TempSrc: Tympanic   Weight: 67 1 kg (148 lb)   Height: 4' 11" (1 499 m)         Current Outpatient Prescriptions:     acetaminophen (TYLENOL) 500 mg tablet, Take 1 tablet by mouth, Disp: , Rfl:     Ascorbic Acid (VITAMIN C GUMMIES PO), Take by mouth 2 gummies a day, Disp: , Rfl:     B Complex-C CAPS, Take 1 capsule by mouth daily, Disp: , Rfl:     Biotin 1 MG CAPS, Take 1 capsule by mouth daily, Disp: , Rfl:     CALCIUM-MAGNESIUM-ZINC PO, Take by mouth daily, Disp: , Rfl:    ibuprofen (MOTRIN) 200 mg tablet, Take 200 mg by mouth every 6 (six) hours as needed for mild pain, Disp: , Rfl:     Mirabegron ER 50 MG TB24, Take 1 tablet (50 mg total) by mouth daily, Disp: 30 tablet, Rfl: 11    Multiple Vitamin (DAILY VALUE MULTIVITAMIN) TABS, Take 1 tablet by mouth daily, Disp: , Rfl:     ranitidine (ZANTAC) 150 MG capsule, Take by mouth, Disp: , Rfl:     The following portions of the patient's history were reviewed by MD and updated by MA as appropriate: allergies, current medications, past family history, past medical history, past social history, past surgical history and problem list       Physical Exam   Constitutional: She is oriented to person, place, and time  She appears well-developed  HENT:   Head: Normocephalic  Eyes: Pupils are equal, round, and reactive to light  EOM are normal  No scleral icterus  Neck: Normal range of motion  Neck supple  No thyromegaly present  Musculoskeletal: Normal range of motion  She exhibits no deformity  Lymphadenopathy:     She has no cervical adenopathy  Neurological: She is oriented to person, place, and time  She has normal reflexes  She displays no atrophy and no tremor  No cranial nerve deficit  She exhibits normal muscle tone  She displays a negative Romberg sign  She displays no seizure activity  Coordination and gait normal  GCS eye subscore is 4  GCS verbal subscore is 5  GCS motor subscore is 6  Reflex Scores:       Tricep reflexes are 2+ on the right side and 2+ on the left side  Bicep reflexes are 2+ on the right side and 2+ on the left side  Patellar reflexes are 2+ on the right side and 2+ on the left side  She is awake but somnolent   Skin: No rash noted  There is erythema  Psychiatric: She has a normal mood and affect  Her behavior is normal  Judgment and thought content normal    Appears to be lethargic  She is more comfortable keeping her eyes closed and open    Her speech is slow, clear and deliver it however her responses worse lower than normal   She claims       RESULTS/DATA    MRI of the brain is carefully reviewed and compared with previous studies  This demonstrates a small amount of enhancement at the base of a tumor resection in the right frontal parietal region  This is unchanged in comparison to previous studies  This likely represents a small area of gliosis rather than residual tumor however it is impossible to absolutely distinguished the 2 from each other  No other major alterations are noted in this study  Specifically the amygdala and hippocampus region appeared to be within normal limits  Kelly Trejo

## 2018-11-29 ENCOUNTER — OFFICE VISIT (OUTPATIENT)
Dept: URGENT CARE | Facility: CLINIC | Age: 63
End: 2018-11-29
Payer: COMMERCIAL

## 2018-11-29 VITALS
OXYGEN SATURATION: 100 % | BODY MASS INDEX: 29.03 KG/M2 | DIASTOLIC BLOOD PRESSURE: 72 MMHG | WEIGHT: 144 LBS | RESPIRATION RATE: 16 BRPM | TEMPERATURE: 97.8 F | SYSTOLIC BLOOD PRESSURE: 155 MMHG | HEART RATE: 70 BPM | HEIGHT: 59 IN

## 2018-11-29 DIAGNOSIS — T78.40XA ALLERGIC REACTION, INITIAL ENCOUNTER: Primary | ICD-10-CM

## 2018-11-29 PROCEDURE — 99213 OFFICE O/P EST LOW 20 MIN: CPT | Performed by: NURSE PRACTITIONER

## 2018-11-29 NOTE — PROGRESS NOTES
[id number]        NAME: Steven Rubin is a 63 y o  female  : 1955    MRN: [id number]  DATE: 2018  TIME: 5:45 PM    Assessment and Plan   Allergic reaction, initial encounter Tremayneeduardo Hoyos  1  Allergic reaction, initial encounter           Patient Instructions   You are have a localized reaction to your flu shot in your left deltoid  You may apply cool compressed for comfort  Tylenol or ibuprofen can also be used for comfort  Continue to use the affected extremity  Benadryl OTC will also be helpful, take as prescribed on box directions  The site has been out line in pen, if it worsens follow up with your PCP  If you become short of breath or symptoms worsen follow up in ER  Follow up with PCP in 3-5 days  Proceed to  ER if symptoms worsen  Chief Complaint     Chief Complaint   Patient presents with    Medication Problem     possible local reaction to flu shot , received flu shot yesterday at work, area of injection red, warm and painful         History of Present Illness       63year old healthy female present with localized redness to her left deltoid in the area in which she received her flu shot  She received her flu shot on Tuesday, the symptoms began Wednesday  Tonight she presents with a localized red area that is tender and warm to touch  Patient and  state that it was worse this morning  She has no complaints of pain with moving the left extremity  Denies an fevers or SOB  She is a  and has no difficulty do her daily tasks  Review of Systems   Review of Systems   Constitutional: Negative for chills and fever  Respiratory: Negative for chest tightness and shortness of breath  Skin: Positive for rash           Current Medications       Current Outpatient Prescriptions:     acetaminophen (TYLENOL) 500 mg tablet, Take 1 tablet by mouth, Disp: , Rfl:     Ascorbic Acid (VITAMIN C GUMMIES PO), Take by mouth 2 gummies a day, Disp: , Rfl:     B Complex-C CAPS, Take 1 capsule by mouth daily, Disp: , Rfl:     Biotin 1 MG CAPS, Take 1 capsule by mouth daily, Disp: , Rfl:     CALCIUM-MAGNESIUM-ZINC PO, Take by mouth daily, Disp: , Rfl:     ibuprofen (MOTRIN) 200 mg tablet, Take 200 mg by mouth every 6 (six) hours as needed for mild pain, Disp: , Rfl:     Mirabegron ER 50 MG TB24, Take 1 tablet (50 mg total) by mouth daily, Disp: 30 tablet, Rfl: 11    Multiple Vitamin (DAILY VALUE MULTIVITAMIN) TABS, Take 1 tablet by mouth daily, Disp: , Rfl:     ranitidine (ZANTAC) 150 MG capsule, Take by mouth, Disp: , Rfl:     Current Allergies     Allergies as of 11/29/2018 - Reviewed 11/27/2018   Allergen Reaction Noted    Other  03/16/2017            The following portions of the patient's history were reviewed and updated as appropriate: allergies, current medications, past family history, past medical history, past social history, past surgical history and problem list      Past Medical History:   Diagnosis Date    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[address] )     GERD (gastroesophageal reflux disease)     History of arteriovenous malformation     Liver problem        Past Surgical History:   Procedure Laterality Date    BRAIN SURGERY      CRANIOTOMY      LIVER SURGERY      adenoma removed from liver    MT COLONOSCOPY FLX DX W/COLLJ SPEC WHEN PFRMD N/A 3/20/2017    Procedure: EGD AND COLONOSCOPY;  Surgeon: Jerry Banks MD;  Location: [facility]; Service: Gastroenterology    TUBAL LIGATION         Family History   Problem Relation Age of Onset    Brain cancer Father     Lung cancer Mother     Melanoma Sister     Lung cancer Brother     Ovarian cancer Maternal Grandmother     Breast cancer Paternal Grandmother     Prostate cancer Brother          Medications have been verified          Objective   /72   Pulse 70   Temp 97 8 °F (36 6 °C)   Resp 16   Ht 4' 11" (1 499 m)   Wt 65 3 kg (144 lb)   SpO2 100%   BMI 29 08 kg/m²        Physical Exam     Physical Exam   Constitutional: She appears well-developed and well-nourished  Skin: Skin is warm and dry  Localized red area to left upper deltoid, slightly indurated where the injection was given  Warm to touch with some tenderness, there is no streaking present  Psychiatric: She has a normal mood and affect  Her behavior is normal    Nursing note and vitals reviewed

## 2018-11-29 NOTE — PATIENT INSTRUCTIONS
You are have a localized reaction to your flu shot in your left deltoid  You may apply cool compressed for comfort  Tylenol or ibuprofen can also be used for comfort  Continue to use the affected extremity  Benadryl OTC will also be helpful, take as prescribed on box directions  The site has been out line in pen, if it worsens follow up with your PCP  If you become short of breath or symptoms worsen follow up in ER

## 2018-12-10 ENCOUNTER — APPOINTMENT (OUTPATIENT)
Dept: LAB | Facility: HOSPITAL | Age: 63
End: 2018-12-10
Attending: NEUROLOGICAL SURGERY
Payer: COMMERCIAL

## 2018-12-10 ENCOUNTER — TRANSCRIBE ORDERS (OUTPATIENT)
Dept: ADMINISTRATIVE | Facility: HOSPITAL | Age: 63
End: 2018-12-10

## 2018-12-10 DIAGNOSIS — D42.0 ATYPICAL INTRACRANIAL MENINGIOMA (HCC): ICD-10-CM

## 2018-12-10 LAB
ANION GAP SERPL CALCULATED.3IONS-SCNC: 7 MMOL/L (ref 4–13)
BASOPHILS # BLD AUTO: 0.03 THOUSANDS/ΜL (ref 0–0.1)
BASOPHILS NFR BLD AUTO: 1 % (ref 0–1)
BUN SERPL-MCNC: 20 MG/DL (ref 5–25)
CALCIUM SERPL-MCNC: 8.9 MG/DL (ref 8.3–10.1)
CHLORIDE SERPL-SCNC: 103 MMOL/L (ref 100–108)
CO2 SERPL-SCNC: 28 MMOL/L (ref 21–32)
CORTIS AM PEAK SERPL-MCNC: 6.3 UG/DL (ref 4.2–22.4)
CREAT SERPL-MCNC: 0.76 MG/DL (ref 0.6–1.3)
EOSINOPHIL # BLD AUTO: 0.16 THOUSAND/ΜL (ref 0–0.61)
EOSINOPHIL NFR BLD AUTO: 4 % (ref 0–6)
ERYTHROCYTE [DISTWIDTH] IN BLOOD BY AUTOMATED COUNT: 12 % (ref 11.6–15.1)
GFR SERPL CREATININE-BSD FRML MDRD: 86 ML/MIN/1.73SQ M
GLUCOSE P FAST SERPL-MCNC: 94 MG/DL (ref 65–99)
HCT VFR BLD AUTO: 39 % (ref 34.8–46.1)
HGB BLD-MCNC: 12.7 G/DL (ref 11.5–15.4)
IMM GRANULOCYTES # BLD AUTO: 0.01 THOUSAND/UL (ref 0–0.2)
IMM GRANULOCYTES NFR BLD AUTO: 0 % (ref 0–2)
LYMPHOCYTES # BLD AUTO: 1.79 THOUSANDS/ΜL (ref 0.6–4.47)
LYMPHOCYTES NFR BLD AUTO: 41 % (ref 14–44)
MCH RBC QN AUTO: 29.6 PG (ref 26.8–34.3)
MCHC RBC AUTO-ENTMCNC: 32.6 G/DL (ref 31.4–37.4)
MCV RBC AUTO: 91 FL (ref 82–98)
MONOCYTES # BLD AUTO: 0.38 THOUSAND/ΜL (ref 0.17–1.22)
MONOCYTES NFR BLD AUTO: 9 % (ref 4–12)
NEUTROPHILS # BLD AUTO: 1.98 THOUSANDS/ΜL (ref 1.85–7.62)
NEUTS SEG NFR BLD AUTO: 45 % (ref 43–75)
NRBC BLD AUTO-RTO: 0 /100 WBCS
PLATELET # BLD AUTO: 157 THOUSANDS/UL (ref 149–390)
PMV BLD AUTO: 10.8 FL (ref 8.9–12.7)
POTASSIUM SERPL-SCNC: 3.9 MMOL/L (ref 3.5–5.3)
RBC # BLD AUTO: 4.29 MILLION/UL (ref 3.81–5.12)
SODIUM SERPL-SCNC: 138 MMOL/L (ref 136–145)
TSH SERPL DL<=0.05 MIU/L-ACNC: 1.01 UIU/ML (ref 0.36–3.74)
WBC # BLD AUTO: 4.35 THOUSAND/UL (ref 4.31–10.16)

## 2018-12-10 PROCEDURE — 84443 ASSAY THYROID STIM HORMONE: CPT

## 2018-12-10 PROCEDURE — 36415 COLL VENOUS BLD VENIPUNCTURE: CPT

## 2018-12-10 PROCEDURE — 80048 BASIC METABOLIC PNL TOTAL CA: CPT

## 2018-12-10 PROCEDURE — 82533 TOTAL CORTISOL: CPT

## 2018-12-10 PROCEDURE — 85025 COMPLETE CBC W/AUTO DIFF WBC: CPT

## 2018-12-14 ENCOUNTER — HOSPITAL ENCOUNTER (OUTPATIENT)
Dept: SLEEP CENTER | Facility: CLINIC | Age: 63
Discharge: HOME | End: 2018-12-14
Payer: COMMERCIAL

## 2018-12-14 DIAGNOSIS — G47.10 HYPERSOMNOLENCE: ICD-10-CM

## 2018-12-14 PROCEDURE — 95810 POLYSOM 6/> YRS 4/> PARAM: CPT | Performed by: INTERNAL MEDICINE

## 2018-12-14 PROCEDURE — 95810 POLYSOM 6/> YRS 4/> PARAM: CPT

## 2018-12-15 NOTE — PROGRESS NOTES
Sleep Study Documentation    Pre-Sleep Study       Sleep testing procedure explained to patient:YES    Patient napped prior to study:NO    Caffeine:Dayshift worker after 12PM   Caffeine use:NO    Alcohol:Dayshift workers after 5PM: Alcohol use:NO    Typical day for patient:YES       Study Documentation  Diagnostic   Snore: Moderate  Supplemental O2: no    O2 flow rate (L/min) range NA  O2 flow rate (L/min) final NA  Minimum SaO2 86  Baseline SaO2 99        Mode of Therapy:    EKG abnormalities: no     EEG abnormalities: no    Study Terminated:no    Patient classification: disabled       Post-Sleep Study    Medication used at bedtime or during sleep study:NO    Patient reports time it took to fall asleep:less than 20 minutes    Patient reports waking up during study:Denied    Patient reports sleeping 4 to 6 hours with dreaming  Patient reports sleep during study:typical    Patient rated sleepiness: Not sleepy or tired    PAP treatment:no

## 2018-12-21 ENCOUNTER — TELEPHONE (OUTPATIENT)
Dept: NEUROSURGERY | Facility: CLINIC | Age: 63
End: 2018-12-21

## 2018-12-21 DIAGNOSIS — Z71.2 ENCOUNTER TO DISCUSS TEST RESULTS: Primary | ICD-10-CM

## 2018-12-21 DIAGNOSIS — G47.33 MILD OBSTRUCTIVE SLEEP APNEA: ICD-10-CM

## 2018-12-21 NOTE — TELEPHONE ENCOUNTER
Dr Annalise Marina,    Patient inquired about the results from her sleep study that were resulted on 12/20/18, it is uploaded in [facility] under the procedures tab  You ordered the study  She was last seen in the office on 11/27/18  Her next follow up appointment is on 5/28/19  Should I direct the patient to her PCP to review results from the sleep study and the recommendations? Thank you

## 2018-12-21 NOTE — TELEPHONE ENCOUNTER
----- Message from [employer] sent at 12/21/2018  9:26 AM EST -----  Regarding: Test Results Question  Contact: [phone]  I have a question about EXTERNAL PROCEDURE resulted on 12/20/18, 4:17 PM

## 2018-12-24 ENCOUNTER — TELEPHONE (OUTPATIENT)
Dept: NEUROSURGERY | Facility: CLINIC | Age: 63
End: 2018-12-24

## 2018-12-24 NOTE — TELEPHONE ENCOUNTER
IMPRESSION:   1  Mild obstructive sleep apnea that is moderate during REM sleep (apnea+hypopnea index (AHI) of 7 1/hour in general that is increased to 22 9 during REM sleep,O2 sat olivia of 86%, and O2 sat <89% for 0 9 mins)  2  No evidence of periodic limb movements of sleep (PLMS)  Recommendations:   1  CPAP titration study  2  Sleep clinic appointment  3  Weight loss/avoid weight gain   4  Avoid alcohol, avoid supine sleep, avoid drowsy driving and operating heavy machinery while drowsy

## 2018-12-24 NOTE — TELEPHONE ENCOUNTER
Called the patient and relayed Dr Zhen Clifford recommendation to schedule an appointment with her PCP, Dr Liza Kong, to review sleep study results/recommendations  Confirmed her PCP with patient  Morena Baker will place referral to Dr Go Claire office to notify his office to review the study  Provided my direct line to the patient if she may have any trouble scheduling with her PCP  She was appreciative

## 2018-12-24 NOTE — TELEPHONE ENCOUNTER
Please schedule an appointment to discuss    It will be more appropriate for the primary care physician to discuss this if they feel comfortable in doing so

## 2019-01-09 ENCOUNTER — TELEPHONE (OUTPATIENT)
Dept: SLEEP CENTER | Facility: CLINIC | Age: 64
End: 2019-01-09

## 2019-02-06 ENCOUNTER — OFFICE VISIT (OUTPATIENT)
Dept: PULMONOLOGY | Facility: MEDICAL CENTER | Age: 64
End: 2019-02-06
Payer: COMMERCIAL

## 2019-02-06 VITALS
BODY MASS INDEX: 30.24 KG/M2 | WEIGHT: 150 LBS | OXYGEN SATURATION: 98 % | TEMPERATURE: 98.3 F | HEART RATE: 73 BPM | SYSTOLIC BLOOD PRESSURE: 136 MMHG | HEIGHT: 59 IN | DIASTOLIC BLOOD PRESSURE: 72 MMHG | RESPIRATION RATE: 12 BRPM

## 2019-02-06 DIAGNOSIS — G47.33 OBSTRUCTIVE SLEEP APNEA: Primary | ICD-10-CM

## 2019-02-06 DIAGNOSIS — G47.10 HYPERSOMNOLENCE: ICD-10-CM

## 2019-02-06 PROCEDURE — 99242 OFF/OP CONSLTJ NEW/EST SF 20: CPT | Performed by: INTERNAL MEDICINE

## 2019-02-07 NOTE — ASSESSMENT & PLAN NOTE
Yony Barrera does have mild obstructive sleep apnea  She did have an in-lab sleep study done on December 14, 2018 at [address] [place] [address]  Her latency to sleep onset was Spiriva at 4 8 min  She did go through all stages of sleep in REM sleep time was decreased at 14% of her total recorded sleep time  She had 53 respiratory events  There were 30 obstructive apneas, 1 central apnea and 22 hypopneas  Overall AHI was mildly elevated at 5 3 and this increased to 22 9 during REM sleep  She spent 30% of time supine in remainder sleeping on her left side  Normally at home she sleeps on her left side  Her mean O2 saturation was 96% and lowest O2 saturation was 86% he spent less than 1 min below 88% O2 saturation       I did discuss the diagnosis and treatment of obstructive sleep apnea with Rashmi  Her  has obstructive sleep apnea and is presently on a BiPAP machine  She was agreeable to CPAP therapy so I would did order auto CPAP with initial range of 5-20 cm water  I did talk to her about the need to me compliance criteria to keep her CPAP machine  I told her she had to use the CPAP machine for 21 out of 30 days during the trial and for an average of 4 hr per night  She will have a CPAP follow-up visit about 6 weeks after she has her CPAP machine  I advised her to call our office at any time she had problems with her CPAP machine or difficulty using it  In regard to possibility narcolepsy she does not have any history of cataplexy and it is possible that obstructive sleep apnea can be causing her symptoms of daytime fatigue  After treatment with CPAP for her MILEY and she still has excessive daytime fatigue then will consider multiple sleep latency test to evaluate for any possibility of narcolepsy without cataplexy

## 2019-04-10 ENCOUNTER — OFFICE VISIT (OUTPATIENT)
Dept: PULMONOLOGY | Facility: MEDICAL CENTER | Age: 64
End: 2019-04-10
Payer: COMMERCIAL

## 2019-04-10 VITALS
RESPIRATION RATE: 12 BRPM | WEIGHT: 151 LBS | TEMPERATURE: 97.6 F | SYSTOLIC BLOOD PRESSURE: 128 MMHG | HEIGHT: 60 IN | OXYGEN SATURATION: 96 % | DIASTOLIC BLOOD PRESSURE: 64 MMHG | HEART RATE: 74 BPM | BODY MASS INDEX: 29.64 KG/M2

## 2019-04-10 DIAGNOSIS — G47.33 OBSTRUCTIVE SLEEP APNEA: Primary | ICD-10-CM

## 2019-04-10 PROCEDURE — 99213 OFFICE O/P EST LOW 20 MIN: CPT | Performed by: INTERNAL MEDICINE

## 2019-05-06 DIAGNOSIS — R35.0 URINARY FREQUENCY: ICD-10-CM

## 2019-05-22 ENCOUNTER — HOSPITAL ENCOUNTER (OUTPATIENT)
Dept: MRI IMAGING | Facility: HOSPITAL | Age: 64
Discharge: HOME | End: 2019-05-22
Attending: NEUROLOGICAL SURGERY
Payer: COMMERCIAL

## 2019-05-22 DIAGNOSIS — D42.0 ATYPICAL INTRACRANIAL MENINGIOMA (HCC): ICD-10-CM

## 2019-05-22 PROCEDURE — 70553 MRI BRAIN STEM W/O & W/DYE: CPT

## 2019-05-22 PROCEDURE — A9585 GADOBUTROL INJECTION: HCPCS | Performed by: NEUROLOGICAL SURGERY

## 2019-05-22 RX ADMIN — GADOBUTROL 6 ML: 604.72 INJECTION INTRAVENOUS at 20:14

## 2019-05-28 ENCOUNTER — TRANSCRIBE ORDERS (OUTPATIENT)
Dept: NEUROSURGERY | Facility: CLINIC | Age: 64
End: 2019-05-28

## 2019-05-28 ENCOUNTER — OFFICE VISIT (OUTPATIENT)
Dept: NEUROSURGERY | Facility: CLINIC | Age: 64
End: 2019-05-28
Payer: COMMERCIAL

## 2019-05-28 VITALS
TEMPERATURE: 97.3 F | RESPIRATION RATE: 16 BRPM | DIASTOLIC BLOOD PRESSURE: 62 MMHG | SYSTOLIC BLOOD PRESSURE: 132 MMHG | HEIGHT: 60 IN | WEIGHT: 154.2 LBS | HEART RATE: 61 BPM | BODY MASS INDEX: 30.27 KG/M2

## 2019-05-28 DIAGNOSIS — R41.3 MEMORY DIFFICULTIES: ICD-10-CM

## 2019-05-28 DIAGNOSIS — D42.0 ATYPICAL INTRACRANIAL MENINGIOMA (HCC): Primary | ICD-10-CM

## 2019-05-28 DIAGNOSIS — G47.33 OBSTRUCTIVE SLEEP APNEA: ICD-10-CM

## 2019-05-28 PROCEDURE — 99213 OFFICE O/P EST LOW 20 MIN: CPT | Performed by: NEUROLOGICAL SURGERY

## 2019-05-29 ENCOUNTER — TRANSCRIBE ORDERS (OUTPATIENT)
Dept: NEUROSURGERY | Facility: CLINIC | Age: 64
End: 2019-05-29

## 2019-05-29 DIAGNOSIS — Z01.818 PRE-PROCEDURAL EXAMINATION: Primary | ICD-10-CM

## 2019-06-27 ENCOUNTER — RADIATION ONCOLOGY FOLLOW-UP (OUTPATIENT)
Dept: RADIATION ONCOLOGY | Facility: HOSPITAL | Age: 64
End: 2019-06-27
Attending: RADIOLOGY
Payer: COMMERCIAL

## 2019-06-27 VITALS
HEART RATE: 62 BPM | WEIGHT: 152.6 LBS | SYSTOLIC BLOOD PRESSURE: 132 MMHG | RESPIRATION RATE: 16 BRPM | DIASTOLIC BLOOD PRESSURE: 64 MMHG | BODY MASS INDEX: 29.96 KG/M2 | HEIGHT: 60 IN

## 2019-06-27 DIAGNOSIS — D42.0 ATYPICAL INTRACRANIAL MENINGIOMA (HCC): Primary | ICD-10-CM

## 2019-06-27 PROCEDURE — 99211 OFF/OP EST MAY X REQ PHY/QHP: CPT | Performed by: RADIOLOGY

## 2019-06-27 PROCEDURE — G0463 HOSPITAL OUTPT CLINIC VISIT: HCPCS | Performed by: RADIOLOGY

## 2019-11-04 DIAGNOSIS — D32.9 MENINGIOMA (HCC): Primary | ICD-10-CM

## 2019-11-08 ENCOUNTER — TELEPHONE (OUTPATIENT)
Dept: UROLOGY | Facility: AMBULATORY SURGERY CENTER | Age: 64
End: 2019-11-08

## 2019-11-08 ENCOUNTER — TRANSCRIBE ORDERS (OUTPATIENT)
Dept: ADMINISTRATIVE | Facility: HOSPITAL | Age: 64
End: 2019-11-08

## 2019-11-08 ENCOUNTER — PROCEDURE VISIT (OUTPATIENT)
Dept: UROLOGY | Facility: HOSPITAL | Age: 64
End: 2019-11-08
Payer: COMMERCIAL

## 2019-11-08 VITALS
HEIGHT: 60 IN | WEIGHT: 158 LBS | HEART RATE: 65 BPM | BODY MASS INDEX: 31.02 KG/M2 | SYSTOLIC BLOOD PRESSURE: 110 MMHG | DIASTOLIC BLOOD PRESSURE: 72 MMHG

## 2019-11-08 DIAGNOSIS — R35.0 URINARY FREQUENCY: ICD-10-CM

## 2019-11-08 DIAGNOSIS — C67.9 ADENOCARCINOMA OF BLADDER (HCC): Primary | ICD-10-CM

## 2019-11-08 LAB
SL AMB  POCT GLUCOSE, UA: NORMAL
SL AMB LEUKOCYTE ESTERASE,UA: NORMAL
SL AMB POCT BILIRUBIN,UA: NORMAL
SL AMB POCT BLOOD,UA: NORMAL
SL AMB POCT KETONES,UA: NORMAL
SL AMB POCT NITRITE,UA: NORMAL
SL AMB POCT PH,UA: 7.5
SL AMB POCT SPECIFIC GRAVITY,UA: 1.01
SL AMB POCT URINE PROTEIN: NORMAL
SL AMB POCT UROBILINOGEN: 0.2

## 2019-11-08 PROCEDURE — 87086 URINE CULTURE/COLONY COUNT: CPT | Performed by: UROLOGY

## 2019-11-08 PROCEDURE — 81002 URINALYSIS NONAUTO W/O SCOPE: CPT | Performed by: UROLOGY

## 2019-11-08 PROCEDURE — 99213 OFFICE O/P EST LOW 20 MIN: CPT | Performed by: UROLOGY

## 2019-11-08 PROCEDURE — 52000 CYSTOURETHROSCOPY: CPT | Performed by: UROLOGY

## 2019-11-08 RX ORDER — CEFAZOLIN SODIUM 1 G/50ML
1000 SOLUTION INTRAVENOUS ONCE
Status: CANCELLED | OUTPATIENT
Start: 2019-11-08 | End: 2019-11-08

## 2019-11-08 NOTE — H&P (VIEW-ONLY)
Cystoscopy  Date/Time: 11/8/2019 10:44 AM  Performed by: Trudy Pulido MD  Authorized by: Trudy Pulido MD     Procedure details: cystoscopy          Written Consent Obtained  Indications for Procedure:  History of low-grade bladder cancer    Physical Exam    Constitutional   General appearance: No acute distress, well appearing and well nourished  Pulmonary   Respiratory effort: No increased work of breathing or signs of respiratory distress  Cardiovascular   Examination of extremities for edema and/or varicosities: Normal     Abdomen   Abdomen: Non-tender, no masses  Liver and spleen: No hepatomegaly or splenomegaly  Genitourinary   External genitalia and vagina: Normal, no lesions appreciated  Urethra: Normal, no discharge  Bladder: Not distended, no tenderness  Musculoskeletal   Gait and station: Normal     Skin   Skin and subcutaneous tissue: Normal without rashes or lesions  Lymphatic   Palpation of lymph nodes in groin: No lymphadenopathy  Additional Exam: Neuro exam nonfocal           The flexible cystoscope was introduced into the urethra and advanced into the bladder  URETHRA:  Normal,  without strictures or lesions  TRIGONE & UOs:   Normal anatomy with efflux of clear urine  No mucosal lesions or subtrigonal masses  BLADDER MUCOSA:  Papillary neoplasm noted as described below  DETRUSOR: Normal capacity, without flaccidity, without excessive compliance, without trabeculation or diverticula, without uninhibited bladder contractions on fillings  RETROFLEXED SCOPE VIEW:  1 cm papillary neoplasm noted at the 12 o'clock position of the bladder neck  Plan:  Cystoscopy today demonstrates recurrence  We will need to proceed with transurethral resection of bladder tumor  Risks and benefits were discussed and she was consented for the surgery  Will also go ahead and get a CT urogram to rule out any upper tract lesions as a source for this recurrence    In addition to the cystoscopy today I spent over 15 minutes discussing the results and consenting her for surgery

## 2019-11-08 NOTE — TELEPHONE ENCOUNTER
Confirmed 12/4/19 @ [facility] with Dr Travis GONZALEZ  Instructions and testing needed discussed  Paperwork emailed today  Inbasket sent to assist finding POST OP

## 2019-11-08 NOTE — PROGRESS NOTES
Cystoscopy  Date/Time: 11/8/2019 10:44 AM  Performed by: Delmer Churchill MD  Authorized by: Delmer Churchill MD     Procedure details: cystoscopy          Written Consent Obtained  Indications for Procedure:  History of low-grade bladder cancer    Physical Exam    Constitutional   General appearance: No acute distress, well appearing and well nourished  Pulmonary   Respiratory effort: No increased work of breathing or signs of respiratory distress  Cardiovascular   Examination of extremities for edema and/or varicosities: Normal     Abdomen   Abdomen: Non-tender, no masses  Liver and spleen: No hepatomegaly or splenomegaly  Genitourinary   External genitalia and vagina: Normal, no lesions appreciated  Urethra: Normal, no discharge  Bladder: Not distended, no tenderness  Musculoskeletal   Gait and station: Normal     Skin   Skin and subcutaneous tissue: Normal without rashes or lesions  Lymphatic   Palpation of lymph nodes in groin: No lymphadenopathy  Additional Exam: Neuro exam nonfocal           The flexible cystoscope was introduced into the urethra and advanced into the bladder  URETHRA:  Normal,  without strictures or lesions  TRIGONE & UOs:   Normal anatomy with efflux of clear urine  No mucosal lesions or subtrigonal masses  BLADDER MUCOSA:  Papillary neoplasm noted as described below  DETRUSOR: Normal capacity, without flaccidity, without excessive compliance, without trabeculation or diverticula, without uninhibited bladder contractions on fillings  RETROFLEXED SCOPE VIEW:  1 cm papillary neoplasm noted at the 12 o'clock position of the bladder neck  Plan:  Cystoscopy today demonstrates recurrence  We will need to proceed with transurethral resection of bladder tumor  Risks and benefits were discussed and she was consented for the surgery  Will also go ahead and get a CT urogram to rule out any upper tract lesions as a source for this recurrence    In addition to the cystoscopy today I spent over 15 minutes discussing the results and consenting her for surgery

## 2019-11-09 ENCOUNTER — APPOINTMENT (OUTPATIENT)
Dept: LAB | Facility: HOSPITAL | Age: 64
End: 2019-11-09
Attending: NEUROLOGICAL SURGERY
Payer: COMMERCIAL

## 2019-11-09 ENCOUNTER — TRANSCRIBE ORDERS (OUTPATIENT)
Dept: ADMINISTRATIVE | Facility: HOSPITAL | Age: 64
End: 2019-11-09

## 2019-11-09 DIAGNOSIS — C67.9 MALIGNANT NEOPLASM OF URINARY BLADDER, UNSPECIFIED SITE (HCC): ICD-10-CM

## 2019-11-09 DIAGNOSIS — C67.9 ADENOCARCINOMA OF BLADDER (HCC): ICD-10-CM

## 2019-11-09 DIAGNOSIS — Z01.818 PRE-PROCEDURAL EXAMINATION: ICD-10-CM

## 2019-11-09 LAB
ANION GAP SERPL CALCULATED.3IONS-SCNC: 4 MMOL/L (ref 4–13)
BACTERIA UR CULT: NORMAL
BASOPHILS # BLD AUTO: 0.04 THOUSANDS/ΜL (ref 0–0.1)
BASOPHILS NFR BLD AUTO: 1 % (ref 0–1)
BUN SERPL-MCNC: 19 MG/DL (ref 5–25)
CALCIUM SERPL-MCNC: 8.9 MG/DL (ref 8.3–10.1)
CHLORIDE SERPL-SCNC: 109 MMOL/L (ref 100–108)
CO2 SERPL-SCNC: 29 MMOL/L (ref 21–32)
CREAT SERPL-MCNC: 0.89 MG/DL (ref 0.6–1.3)
EOSINOPHIL # BLD AUTO: 0.24 THOUSAND/ΜL (ref 0–0.61)
EOSINOPHIL NFR BLD AUTO: 6 % (ref 0–6)
ERYTHROCYTE [DISTWIDTH] IN BLOOD BY AUTOMATED COUNT: 12.1 % (ref 11.6–15.1)
GFR SERPL CREATININE-BSD FRML MDRD: 70 ML/MIN/1.73SQ M
GLUCOSE P FAST SERPL-MCNC: 91 MG/DL (ref 65–99)
HCT VFR BLD AUTO: 40.2 % (ref 34.8–46.1)
HGB BLD-MCNC: 12.8 G/DL (ref 11.5–15.4)
IMM GRANULOCYTES # BLD AUTO: 0.01 THOUSAND/UL (ref 0–0.2)
IMM GRANULOCYTES NFR BLD AUTO: 0 % (ref 0–2)
LYMPHOCYTES # BLD AUTO: 1.53 THOUSANDS/ΜL (ref 0.6–4.47)
LYMPHOCYTES NFR BLD AUTO: 36 % (ref 14–44)
MCH RBC QN AUTO: 29.6 PG (ref 26.8–34.3)
MCHC RBC AUTO-ENTMCNC: 31.8 G/DL (ref 31.4–37.4)
MCV RBC AUTO: 93 FL (ref 82–98)
MONOCYTES # BLD AUTO: 0.34 THOUSAND/ΜL (ref 0.17–1.22)
MONOCYTES NFR BLD AUTO: 8 % (ref 4–12)
NEUTROPHILS # BLD AUTO: 2.06 THOUSANDS/ΜL (ref 1.85–7.62)
NEUTS SEG NFR BLD AUTO: 49 % (ref 43–75)
NRBC BLD AUTO-RTO: 0 /100 WBCS
PLATELET # BLD AUTO: 159 THOUSANDS/UL (ref 149–390)
PMV BLD AUTO: 11.2 FL (ref 8.9–12.7)
POTASSIUM SERPL-SCNC: 4.4 MMOL/L (ref 3.5–5.3)
RBC # BLD AUTO: 4.32 MILLION/UL (ref 3.81–5.12)
SODIUM SERPL-SCNC: 142 MMOL/L (ref 136–145)
WBC # BLD AUTO: 4.22 THOUSAND/UL (ref 4.31–10.16)

## 2019-11-09 PROCEDURE — 85025 COMPLETE CBC W/AUTO DIFF WBC: CPT

## 2019-11-09 PROCEDURE — 93005 ELECTROCARDIOGRAM TRACING: CPT

## 2019-11-09 PROCEDURE — 80048 BASIC METABOLIC PNL TOTAL CA: CPT

## 2019-11-09 PROCEDURE — 36415 COLL VENOUS BLD VENIPUNCTURE: CPT

## 2019-11-12 LAB
ATRIAL RATE: 72 BPM
P AXIS: 54 DEGREES
PR INTERVAL: 154 MS
QRS AXIS: 45 DEGREES
QRSD INTERVAL: 78 MS
QT INTERVAL: 410 MS
QTC INTERVAL: 448 MS
T WAVE AXIS: 34 DEGREES
VENTRICULAR RATE: 72 BPM

## 2019-11-12 PROCEDURE — 93010 ELECTROCARDIOGRAM REPORT: CPT | Performed by: INTERNAL MEDICINE

## 2019-11-14 ENCOUNTER — TELEPHONE (OUTPATIENT)
Dept: UROLOGY | Facility: AMBULATORY SURGERY CENTER | Age: 64
End: 2019-11-14

## 2019-11-15 ENCOUNTER — HOSPITAL ENCOUNTER (OUTPATIENT)
Dept: RADIOLOGY | Facility: HOSPITAL | Age: 64
Discharge: HOME | End: 2019-11-15

## 2019-11-15 DIAGNOSIS — C67.9 ADENOCARCINOMA OF BLADDER (HCC): ICD-10-CM

## 2019-11-18 ENCOUNTER — TELEPHONE (OUTPATIENT)
Dept: UROLOGY | Facility: AMBULATORY SURGERY CENTER | Age: 64
End: 2019-11-18

## 2019-11-18 NOTE — TELEPHONE ENCOUNTER
Patient LVM  Sat  About a CT she has scheduled  She was unable to have it due to an allergy to contrast  Needs assistance from clinical   Please call back [phone]  Thank you!

## 2019-11-18 NOTE — TELEPHONE ENCOUNTER
Patient called second time  Will need medication prior to Cat Scan  Sh can be reached [phone]  Message can be left

## 2019-11-19 ENCOUNTER — HOSPITAL ENCOUNTER (OUTPATIENT)
Dept: RADIOLOGY | Facility: HOSPITAL | Age: 64
Discharge: HOME | End: 2019-11-19
Payer: COMMERCIAL

## 2019-11-19 DIAGNOSIS — C67.9 ADENOCARCINOMA OF BLADDER (HCC): ICD-10-CM

## 2019-11-19 PROCEDURE — 74178 CT ABD&PLV WO CNTR FLWD CNTR: CPT

## 2019-11-19 RX ADMIN — IOHEXOL 100 ML: 350 INJECTION, SOLUTION INTRAVENOUS at 17:52

## 2019-11-19 NOTE — TELEPHONE ENCOUNTER
Spoke with Michelle FERREIRA regarding request to remove allergy from chart  Advised that patient states only sneezed  Denied shortness of breath, difficulty breathing, hives, itching, rash, etc  OK per provider to remove allergy from chart

## 2019-11-19 NOTE — TELEPHONE ENCOUNTER
EMANUEL COLINDRES [facility] called asking Allergies to be updated prior to 6pm CT Scan

## 2019-11-19 NOTE — PRE-PROCEDURE INSTRUCTIONS
Pre-Surgery Instructions:   Medication Instructions    acetaminophen (TYLENOL) 500 mg tablet Instructed patient per Anesthesia Guidelines   Ascorbic Acid (VITAMIN C GUMMIES PO) Instructed patient per Anesthesia Guidelines   B Complex-C CAPS Instructed patient per Anesthesia Guidelines   Biotin 1 MG CAPS Instructed patient per Anesthesia Guidelines   CALCIUM-MAGNESIUM-ZINC PO Instructed patient per Anesthesia Guidelines   ibuprofen (MOTRIN) 200 mg tablet Instructed patient per Anesthesia Guidelines   Mirabegron ER 50 MG TB24 Instructed patient per Anesthesia Guidelines   Multiple Vitamin (DAILY VALUE MULTIVITAMIN) TABS Instructed patient per Anesthesia Guidelines      Pre-op medication, and showering instructions with antibacteral soap reviewed

## 2019-11-22 ENCOUNTER — HOSPITAL ENCOUNTER (OUTPATIENT)
Dept: MRI IMAGING | Facility: HOSPITAL | Age: 64
Discharge: HOME | End: 2019-11-22
Attending: NEUROLOGICAL SURGERY
Payer: COMMERCIAL

## 2019-11-22 DIAGNOSIS — R41.3 MEMORY DIFFICULTIES: ICD-10-CM

## 2019-11-22 DIAGNOSIS — D42.0 ATYPICAL INTRACRANIAL MENINGIOMA (HCC): ICD-10-CM

## 2019-11-22 PROCEDURE — A9585 GADOBUTROL INJECTION: HCPCS | Performed by: NEUROLOGICAL SURGERY

## 2019-11-22 PROCEDURE — 70553 MRI BRAIN STEM W/O & W/DYE: CPT

## 2019-11-22 RX ADMIN — GADOBUTROL 7 ML: 604.72 INJECTION INTRAVENOUS at 20:19

## 2019-11-26 ENCOUNTER — OFFICE VISIT (OUTPATIENT)
Dept: NEUROSURGERY | Facility: CLINIC | Age: 64
End: 2019-11-26
Payer: COMMERCIAL

## 2019-11-26 VITALS
RESPIRATION RATE: 16 BRPM | HEIGHT: 60 IN | DIASTOLIC BLOOD PRESSURE: 62 MMHG | BODY MASS INDEX: 30.92 KG/M2 | WEIGHT: 157.5 LBS | SYSTOLIC BLOOD PRESSURE: 128 MMHG | TEMPERATURE: 96.5 F | HEART RATE: 65 BPM

## 2019-11-26 DIAGNOSIS — D42.0 ATYPICAL INTRACRANIAL MENINGIOMA (HCC): Primary | ICD-10-CM

## 2019-11-26 PROCEDURE — 99213 OFFICE O/P EST LOW 20 MIN: CPT | Performed by: NEUROLOGICAL SURGERY

## 2019-11-26 NOTE — PROGRESS NOTES
[facility] Office Note  Kerri Dick is a 66 y o  female      Visit Type: Follow-up      Diagnoses and all orders for this visit:    Atypical intracranial meningioma ([address] )  -     MRI brain with and without contrast; Future      DISCUSSION SUMMARY  This is a 61-year-old female who is status post resection of a large meningioma  There is no sign of recurrence on current studies she is now 4 years out from her original surgery  I have recommend continued follow-up with symptomatic MRIs with an MRI not later than 5 years from today's date  Return in about 5 years (around 11/26/2024) for F/U after test completed, after tests completed  CHIEF COMPLAINT  Patient presents for 6 month follow up with MRI brain for history of meningioma    NEUROSURGERY PROCEDURES  5/6/15 DKO: Image-guided right frontotemporal craniotomy for resection of the large meningioma with dural reconstruction  Final Path Dx: 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)  HISTORY OF PRESENT ILLNESS  Patient was found to have a 5 cm right frontal lobe mass consistent with meningioma status post an image-guided right frontotemporal craniotomy for resection of the large meningioma with dural reconstruction on May 6, 2015  s/p XRT 6/26/15 - 8/7/2015  Routine MRIs have shown no residual tumor but because of the high risk of recurrence of the mass given a grade 2 meningioma, six-month follow-up is important for the 1st 5 years  She denies headaches nausea or vomiting  Her concentration is back to normal  She is doing well  She did complain of some pain and numbness in her feet that occurs only when she is resting  The pain wakes her from her sleep at times  I do not believe this is related to her surgical procedure or meningioma  REVIEW OF SYSTEMS  Review of Systems   Constitutional: Negative  HENT: Negative  Eyes: Negative  Respiratory: Negative  Cardiovascular: Negative  Gastrointestinal: Negative  Endocrine: Negative  Genitourinary: Negative  Musculoskeletal:        Pain in her feet when sleeping   Skin: Negative  Allergic/Immunologic: Negative  Neurological: Negative  Hematological: Negative  Psychiatric/Behavioral: Negative  All other systems reviewed and are negative  I reviewed the ROS  I reviewed the ROS  MEDICAL HISTORY  Active Ambulatory Problems     Diagnosis Date Noted    Atypical [facility]oma ([facility] ) 2018    Lethargic 2018    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    GERD (gastroesophageal reflux disease) 2017    Encounter for gynecological examination without abnormal finding 2018    Hypersomnolence 2018    Memory difficulties 2018    Obstructive sleep apnea 2019     Resolved Ambulatory Problems     Diagnosis Date Noted    No Resolved Ambulatory Problems     Past Medical History:   Diagnosis Date    CPAP (continuous positive airway pressure) dependence     History of arteriovenous malformation     Liver problem     Sleep apnea        Past Surgical History:   Procedure Laterality Date    BRAIN SURGERY      COLONOSCOPY      CRANIOTOMY      LIVER SURGERY      adenoma removed from liver    CO COLONOSCOPY FLX DX W/COLLJ SPEC WHEN PFRMD N/A 3/20/2017    Procedure: EGD AND COLONOSCOPY;  Surgeon: Poncho Pickering MD;  Location: [facility];   Service: Gastroenterology    TUBAL LIGATION         Social History     Tobacco Use   Smoking Status Former Smoker    Packs/day: 3 00    Years: 20 00    Pack years: 60 00    Last attempt to quit: Lucía Lightning Years since quittin 9   Smokeless Tobacco Never Used       Social History     Substance and Sexual Activity   Alcohol Use Yes    Frequency: Monthly or less    Drinks per session: 1 or 2    Comment: occasional       Social History     Substance and Sexual Activity   Drug Use No       Vitals:    19 0812   BP: 128/62   BP Location: Left arm   Patient Position: Sitting   Cuff Size: Standard   Pulse: 65   Resp: 16   Temp: (!) 96 5 °F (35 8 °C)   TempSrc: Tympanic   Weight: 71 4 kg (157 lb 8 oz)   Height: 5' (1 524 m)         Current Outpatient Medications:     acetaminophen (TYLENOL) 500 mg tablet, Take 1 tablet by mouth, Disp: , Rfl:     Ascorbic Acid (VITAMIN C GUMMIES PO), Take by mouth 2 gummies a day, Disp: , Rfl:     B Complex-C CAPS, Take 1 capsule by mouth daily, Disp: , Rfl:     CALCIUM-MAGNESIUM-ZINC PO, Take by mouth daily, Disp: , Rfl:     Collagen 500 MG CAPS, Take by mouth, Disp: , Rfl:     ibuprofen (MOTRIN) 200 mg tablet, Take 200 mg by mouth every 6 (six) hours as needed for mild pain, Disp: , Rfl:     Mirabegron ER 50 MG TB24, Take 1 tablet (50 mg total) by mouth daily, Disp: 90 tablet, Rfl: 3     No Known Allergies     The following portions of the patient's history were updated by MA and reviewed by MD: allergies, current medications, past family history, past medical history, past social history, past surgical history and problem list       Physical Exam  Awake alert and oriented  Extraocular movements are intact  Pupils are equal round reactive to light and accommodate  Facial sensation is intact bilaterally  Facial expression is intact in grimace and at rest  Her power is 5/5  She has no drift  Her station and gait is normal  She is able to perform tandem gait without difficulty    RESULTS/DATA  An MRI of the brain is carefully reviewed  This demonstrates no sign of recurrence  The underlying brain shows no vasogenic edema  The you resection cavity is slowly closing in over time

## 2019-12-04 ENCOUNTER — HOSPITAL ENCOUNTER (OUTPATIENT)
Facility: HOSPITAL | Age: 64
Setting detail: OUTPATIENT SURGERY
Discharge: HOME | End: 2019-12-04
Attending: UROLOGY | Admitting: UROLOGY
Payer: COMMERCIAL

## 2019-12-04 ENCOUNTER — ANESTHESIA EVENT (OUTPATIENT)
Dept: PERIOP | Facility: HOSPITAL | Age: 64
End: 2019-12-04
Payer: COMMERCIAL

## 2019-12-04 ENCOUNTER — ANESTHESIA (OUTPATIENT)
Dept: PERIOP | Facility: HOSPITAL | Age: 64
End: 2019-12-04
Payer: COMMERCIAL

## 2019-12-04 VITALS
OXYGEN SATURATION: 98 % | HEIGHT: 60 IN | WEIGHT: 158 LBS | SYSTOLIC BLOOD PRESSURE: 138 MMHG | TEMPERATURE: 97.5 F | BODY MASS INDEX: 31.02 KG/M2 | DIASTOLIC BLOOD PRESSURE: 66 MMHG | HEART RATE: 68 BPM | RESPIRATION RATE: 18 BRPM

## 2019-12-04 DIAGNOSIS — C67.9 ADENOCARCINOMA OF BLADDER (HCC): ICD-10-CM

## 2019-12-04 PROCEDURE — 88305 TISSUE EXAM BY PATHOLOGIST: CPT | Performed by: PATHOLOGY

## 2019-12-04 PROCEDURE — 52234 CYSTOSCOPY AND TREATMENT: CPT | Performed by: UROLOGY

## 2019-12-04 RX ORDER — PROPOFOL 10 MG/ML
INJECTION, EMULSION INTRAVENOUS AS NEEDED
Status: DISCONTINUED | OUTPATIENT
Start: 2019-12-04 | End: 2019-12-04 | Stop reason: SURG

## 2019-12-04 RX ORDER — PHENAZOPYRIDINE HYDROCHLORIDE 100 MG/1
100 TABLET, FILM COATED ORAL 3 TIMES DAILY PRN
Qty: 20 TABLET | Refills: 0 | Status: SHIPPED | OUTPATIENT
Start: 2019-12-04 | End: 2020-07-08

## 2019-12-04 RX ORDER — ONDANSETRON 2 MG/ML
4 INJECTION INTRAMUSCULAR; INTRAVENOUS ONCE AS NEEDED
Status: DISCONTINUED | OUTPATIENT
Start: 2019-12-04 | End: 2019-12-04 | Stop reason: HOSPADM

## 2019-12-04 RX ORDER — SODIUM CHLORIDE, SODIUM LACTATE, POTASSIUM CHLORIDE, CALCIUM CHLORIDE 600; 310; 30; 20 MG/100ML; MG/100ML; MG/100ML; MG/100ML
INJECTION, SOLUTION INTRAVENOUS CONTINUOUS PRN
Status: DISCONTINUED | OUTPATIENT
Start: 2019-12-04 | End: 2019-12-04 | Stop reason: SURG

## 2019-12-04 RX ORDER — HYDROCODONE BITARTRATE AND ACETAMINOPHEN 5; 325 MG/1; MG/1
1 TABLET ORAL EVERY 4 HOURS PRN
Qty: 10 TABLET | Refills: 0 | Status: SHIPPED | OUTPATIENT
Start: 2019-12-04 | End: 2019-12-14

## 2019-12-04 RX ORDER — CEFAZOLIN SODIUM 1 G/50ML
1000 SOLUTION INTRAVENOUS ONCE
Status: COMPLETED | OUTPATIENT
Start: 2019-12-04 | End: 2019-12-04

## 2019-12-04 RX ORDER — DEXAMETHASONE SODIUM PHOSPHATE 4 MG/ML
INJECTION, SOLUTION INTRA-ARTICULAR; INTRALESIONAL; INTRAMUSCULAR; INTRAVENOUS; SOFT TISSUE AS NEEDED
Status: DISCONTINUED | OUTPATIENT
Start: 2019-12-04 | End: 2019-12-04 | Stop reason: SURG

## 2019-12-04 RX ORDER — ONDANSETRON 2 MG/ML
INJECTION INTRAMUSCULAR; INTRAVENOUS AS NEEDED
Status: DISCONTINUED | OUTPATIENT
Start: 2019-12-04 | End: 2019-12-04 | Stop reason: SURG

## 2019-12-04 RX ORDER — HYDROCODONE BITARTRATE AND ACETAMINOPHEN 5; 325 MG/1; MG/1
1 TABLET ORAL EVERY 4 HOURS PRN
Status: DISCONTINUED | OUTPATIENT
Start: 2019-12-04 | End: 2019-12-04 | Stop reason: HOSPADM

## 2019-12-04 RX ORDER — FENTANYL CITRATE 50 UG/ML
INJECTION, SOLUTION INTRAMUSCULAR; INTRAVENOUS AS NEEDED
Status: DISCONTINUED | OUTPATIENT
Start: 2019-12-04 | End: 2019-12-04 | Stop reason: SURG

## 2019-12-04 RX ORDER — LIDOCAINE HYDROCHLORIDE 10 MG/ML
INJECTION, SOLUTION INFILTRATION; PERINEURAL AS NEEDED
Status: DISCONTINUED | OUTPATIENT
Start: 2019-12-04 | End: 2019-12-04 | Stop reason: SURG

## 2019-12-04 RX ORDER — MAGNESIUM HYDROXIDE 1200 MG/15ML
LIQUID ORAL AS NEEDED
Status: DISCONTINUED | OUTPATIENT
Start: 2019-12-04 | End: 2019-12-04 | Stop reason: HOSPADM

## 2019-12-04 RX ORDER — MIDAZOLAM HYDROCHLORIDE 2 MG/2ML
INJECTION, SOLUTION INTRAMUSCULAR; INTRAVENOUS AS NEEDED
Status: DISCONTINUED | OUTPATIENT
Start: 2019-12-04 | End: 2019-12-04 | Stop reason: SURG

## 2019-12-04 RX ORDER — FENTANYL CITRATE/PF 50 MCG/ML
25 SYRINGE (ML) INJECTION
Status: DISCONTINUED | OUTPATIENT
Start: 2019-12-04 | End: 2019-12-04 | Stop reason: HOSPADM

## 2019-12-04 RX ADMIN — LIDOCAINE HYDROCHLORIDE 50 MG: 10 INJECTION, SOLUTION INFILTRATION; PERINEURAL at 11:23

## 2019-12-04 RX ADMIN — FENTANYL CITRATE 50 MCG: 50 INJECTION INTRAMUSCULAR; INTRAVENOUS at 11:23

## 2019-12-04 RX ADMIN — MIDAZOLAM HYDROCHLORIDE 2 MG: 1 INJECTION, SOLUTION INTRAMUSCULAR; INTRAVENOUS at 11:20

## 2019-12-04 RX ADMIN — DEXAMETHASONE SODIUM PHOSPHATE 4 MG: 4 INJECTION, SOLUTION INTRAMUSCULAR; INTRAVENOUS at 11:24

## 2019-12-04 RX ADMIN — CEFAZOLIN SODIUM 1000 MG: 1 SOLUTION INTRAVENOUS at 11:20

## 2019-12-04 RX ADMIN — ONDANSETRON 4 MG: 2 INJECTION INTRAMUSCULAR; INTRAVENOUS at 11:24

## 2019-12-04 RX ADMIN — PHENYLEPHRINE HYDROCHLORIDE 50 MCG: 10 INJECTION INTRAVENOUS at 11:33

## 2019-12-04 RX ADMIN — PROPOFOL 200 MG: 10 INJECTION, EMULSION INTRAVENOUS at 11:23

## 2019-12-04 RX ADMIN — SODIUM CHLORIDE, SODIUM LACTATE, POTASSIUM CHLORIDE, AND CALCIUM CHLORIDE: .6; .31; .03; .02 INJECTION, SOLUTION INTRAVENOUS at 11:10

## 2019-12-04 NOTE — DISCHARGE INSTRUCTIONS
CYSTOSCOPY, TURBT, PROSTATE BIOPSY, BLADDER BIOPSY   DISCHARGE INSTRUCTIONS    Care after your surgery:  1  You may have burning or red colored urine the first 24 hours  Your burning should  stop in 24 hours and your urine should clear in 24-48 hours  2  You should drink more fluids unless Dr Estela Avalos advises you not to    3  You should return to normal activities when your urine is clear again  4  You may have a small amount of red drainage from your rectum if you had a prostate  biopsy performed  This should stop in 24 hours  5  Take pain medication as prescribed by your doctor  Call Dr Estela Avalos if you have any of the followin  You can not urinate or you have active or persistent bleeding, clots, back pain, or  pain when you urinate  2  You have chills, fever above 101 degrees, or feel sick  3  You have persistent nausea or vomiting, persistent dizziness, or lightheadedness  4  Call Dr Estela Avalos if you have any questions or concerns about your procedure at:  [phone]      Follow-up with Dr Estela Avalos in 1-2 weeks

## 2019-12-04 NOTE — OP NOTE
OPERATIVE REPORT  PATIENT NAME: Jolanta Tran    :  1955  MRN: [id number]  Pt Location: AN OR ROOM 02    SURGERY DATE: 2019    Surgeon(s) and Role:     Jeff Salazar MD - Primary    Preop Diagnosis:  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[facility] ) [C67 9]    Post-Op Diagnosis Codes:     * 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[facility] ) [C67 9]    Procedure(s) (LRB):  TRANSURETHRAL RESECTION OF BLADDER TUMOR (TURBT) (N/A) less than 2 cm    Specimen(s):  ID Type Source Tests Collected by Time Destination   1 : BLADDER TUMOR BIOPSY Tissue Urinary Bladder TISSUE EXAM Genevieve Solorio MD 2019 1139        Estimated Blood Loss:   Minimal    Drains:  * No LDAs found *    Anesthesia Type:   General    Operative Indications:  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[facility] ) [C67 9]      Operative Findings:  Less than 1 cm papillary tumor on the anterior bladder wall near the bladder neck at the 12 o'clock position  Complications:   None    Procedure and Technique:  After informed consent was obtained the patient was brought to the operating room  Preoperative antibiotics were given for infection prophylaxis  Bilateral sequential compressive devices placed on lower extremities for DVT prophylaxis  A time-out was performed to identify the patient and surgery to be performed  The patient was then placed under general anesthesia prepped and draped in standard sterile fashion in dorsal lithotomy position  A 22 [place] cystoscope was inserted into the bladder and the bladder was inspected with a 70 degree lens  Only 1 small papillary tumor was noted at the 12 o'clock position near the bladder neck  The cystoscope was removed and a dual flow resectoscope was inserted  The tumor was resected with loop electrocautery and the base was fulgurated  The bladder was inspected under low volumes to ensure good hemostasis  The patient's bladder was emptied and the resectoscope was removed    She was then woken taken to the postanesthesia care unit stable condition     I was present for the entire procedure    Patient Disposition:  PACU     SIGNATURE: Sherrell Saavedra MD  DATE: December 4, 2019  TIME: 11:43 AM

## 2019-12-04 NOTE — ANESTHESIA PREPROCEDURE EVALUATION
Review of Systems/Medical History  Patient summary reviewed  Chart reviewed  No history of anesthetic complications     Cardiovascular   Pulmonary  Smoker ex-smoker  , Sleep apnea CPAP,        GI/Hepatic    GERD ,             Endo/Other     GYN       Hematology   Musculoskeletal       Neurology      Comment: Atypical intracranial meningioma s/p surgery Psychology           Physical Exam    Airway    Mallampati score: II  TM Distance: >3 FB  Neck ROM: full     Dental   No notable dental hx     Cardiovascular  Cardiovascular exam normal    Pulmonary  Pulmonary exam normal     Other Findings        Anesthesia Plan  ASA Score- 2     Anesthesia Type- general with ASA Monitors  Additional Monitors:   Airway Plan: LMA  Plan Factors-    Induction- intravenous  Postoperative Plan- Plan for postoperative opioid use  Informed Consent- Anesthetic plan and risks discussed with patient

## 2019-12-04 NOTE — INTERVAL H&P NOTE
H&P reviewed  After examining the patient I find no changes in the patients condition since the H&P had been written      Vitals:    12/04/19 0957   BP: 123/58   Pulse: 69   Resp: 18   Temp: 97 5 °F (36 4 °C)   SpO2: 99%

## 2019-12-04 NOTE — ANESTHESIA POSTPROCEDURE EVALUATION
Post-Op Assessment Note    CV Status:  Stable  Pain Score: 0    Pain management: adequate     Mental Status:  Awake and somnolent   Hydration Status:  Stable   PONV Controlled:  Controlled   Airway Patency:  Patent   Post Op Vitals Reviewed: Yes      Staff: CRNA           /60 (12/04/19 1154)    Temp 97 7 °F (36 5 °C) (12/04/19 1154)    Pulse 69 (12/04/19 1154)   Resp 14 (12/04/19 1154)    SpO2 99 % (12/04/19 1154)

## 2019-12-05 ENCOUNTER — TELEPHONE (OUTPATIENT)
Dept: UROLOGY | Facility: AMBULATORY SURGERY CENTER | Age: 64
End: 2019-12-05

## 2019-12-05 NOTE — TELEPHONE ENCOUNTER
Post Op Note    Alejandrina Madrigal is a 66 y o  female s/p TURBT performed 12/18/2019  Alejandrina Madrigal is a patient of Dr Gabrielle Schultz and is seen at the [place] office       LM that I wanted to see how she was doing s/p surgery

## 2019-12-18 ENCOUNTER — OFFICE VISIT (OUTPATIENT)
Dept: UROLOGY | Facility: AMBULATORY SURGERY CENTER | Age: 64
End: 2019-12-18
Payer: COMMERCIAL

## 2019-12-18 VITALS
WEIGHT: 158 LBS | SYSTOLIC BLOOD PRESSURE: 132 MMHG | HEART RATE: 64 BPM | BODY MASS INDEX: 31.02 KG/M2 | DIASTOLIC BLOOD PRESSURE: 82 MMHG | HEIGHT: 60 IN

## 2019-12-18 DIAGNOSIS — C67.9 ADENOCARCINOMA OF BLADDER (HCC): Primary | ICD-10-CM

## 2019-12-18 LAB
SL AMB  POCT GLUCOSE, UA: NORMAL
SL AMB LEUKOCYTE ESTERASE,UA: NORMAL
SL AMB POCT BILIRUBIN,UA: NORMAL
SL AMB POCT BLOOD,UA: NORMAL
SL AMB POCT CLARITY,UA: CLEAR
SL AMB POCT COLOR,UA: YELLOW
SL AMB POCT KETONES,UA: NORMAL
SL AMB POCT NITRITE,UA: NORMAL
SL AMB POCT PH,UA: 7
SL AMB POCT SPECIFIC GRAVITY,UA: 1
SL AMB POCT URINE PROTEIN: NORMAL
SL AMB POCT UROBILINOGEN: 0.2

## 2019-12-18 PROCEDURE — 81002 URINALYSIS NONAUTO W/O SCOPE: CPT | Performed by: UROLOGY

## 2019-12-18 PROCEDURE — 99213 OFFICE O/P EST LOW 20 MIN: CPT | Performed by: UROLOGY

## 2019-12-19 NOTE — ASSESSMENT & PLAN NOTE
I reviewed the pathology results with the patient and her   I was happy to report there is only some mild low-grade recurrence  She does not need any further treatments for this  She will need to follow up in 3 months for surveillance cystoscopy

## 2019-12-19 NOTE — PROGRESS NOTES
Assessment/Plan:    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[facility] )  I reviewed the pathology results with the patient and her   I was happy to report there is only some mild low-grade recurrence  She does not need any further treatments for this  She will need to follow up in 3 months for surveillance cystoscopy  Diagnoses and all orders for this visit:    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[facility] )  -     POCT urine dip          Total visit time was 15 minutes of which over 50% was spent on counseling  Subjective:     Patient ID: Александр Mcdermott is a 60 y o  female    81-year-old female presents for follow-up after transurethral resection of bladder tumor  Other than some dysuria she has been doing well  She denies any gross hematuria  She states that the dysuria is improving significantly  She has no other complaints  The following portions of the patient's history were reviewed and updated as appropriate: allergies, current medications, past family history, past medical history, past social history, past surgical history and problem list     Review of Systems   Constitutional: Negative  HENT: Negative  Eyes: Negative  Respiratory: Negative  Cardiovascular: Negative  Gastrointestinal: Negative  Endocrine: Negative  Genitourinary:        As noted per HPI   Musculoskeletal: Negative  Skin: Negative  Allergic/Immunologic: Negative  Neurological: Negative  Hematological: Negative  Psychiatric/Behavioral: Negative  Objective:    Physical Exam   Constitutional: She is oriented to person, place, and time  She appears well-developed and well-nourished  HENT:   Head: Normocephalic and atraumatic  Neck: Normal range of motion  Neck supple  Cardiovascular: Intact distal pulses  Pulmonary/Chest: Effort normal    Abdominal: Soft  Bowel sounds are normal  She exhibits no distension and no mass  There is no tenderness  There is no rebound and no guarding  Musculoskeletal: Normal range of motion  Neurological: She is alert and oriented to person, place, and time  Skin: Skin is warm and dry  Psychiatric: She has a normal mood and affect  Vitals reviewed          Results  No results found for: PSA  Lab Results   Component Value Date    GLUCOSE 140 05/09/2015    CALCIUM 8 9 11/09/2019     05/09/2015    K 4 4 11/09/2019    CO2 29 11/09/2019     (H) 11/09/2019    BUN 19 11/09/2019    CREATININE 0 89 11/09/2019     Lab Results   Component Value Date    WBC 4 22 (L) 11/09/2019    HGB 12 8 11/09/2019    HCT 40 2 11/09/2019    MCV 93 11/09/2019     11/09/2019       No results found for this or any previous visit (from the past 1 hour(s)) ]

## 2020-04-09 ENCOUNTER — TELEMEDICINE (OUTPATIENT)
Dept: PULMONOLOGY | Facility: MEDICAL CENTER | Age: 65
End: 2020-04-09

## 2020-04-09 DIAGNOSIS — G47.33 OBSTRUCTIVE SLEEP APNEA: Primary | ICD-10-CM

## 2020-04-09 PROCEDURE — 99213 OFFICE O/P EST LOW 20 MIN: CPT | Performed by: INTERNAL MEDICINE

## 2020-04-23 ENCOUNTER — PROCEDURE VISIT (OUTPATIENT)
Dept: UROLOGY | Facility: AMBULATORY SURGERY CENTER | Age: 65
End: 2020-04-23
Payer: COMMERCIAL

## 2020-04-23 VITALS
HEART RATE: 82 BPM | DIASTOLIC BLOOD PRESSURE: 82 MMHG | WEIGHT: 158 LBS | BODY MASS INDEX: 31.02 KG/M2 | HEIGHT: 60 IN | SYSTOLIC BLOOD PRESSURE: 130 MMHG

## 2020-04-23 DIAGNOSIS — C67.9 ADENOCARCINOMA OF BLADDER (HCC): Primary | ICD-10-CM

## 2020-04-23 LAB
SL AMB  POCT GLUCOSE, UA: NORMAL
SL AMB LEUKOCYTE ESTERASE,UA: NORMAL
SL AMB POCT BILIRUBIN,UA: NORMAL
SL AMB POCT BLOOD,UA: NORMAL
SL AMB POCT CLARITY,UA: CLEAR
SL AMB POCT COLOR,UA: NORMAL
SL AMB POCT KETONES,UA: NORMAL
SL AMB POCT NITRITE,UA: NORMAL
SL AMB POCT PH,UA: 6.5
SL AMB POCT SPECIFIC GRAVITY,UA: 1
SL AMB POCT URINE PROTEIN: NORMAL
SL AMB POCT UROBILINOGEN: 0.2

## 2020-04-23 PROCEDURE — 81002 URINALYSIS NONAUTO W/O SCOPE: CPT | Performed by: UROLOGY

## 2020-04-23 PROCEDURE — 52000 CYSTOURETHROSCOPY: CPT | Performed by: UROLOGY

## 2020-06-25 ENCOUNTER — CLINICAL SUPPORT (OUTPATIENT)
Dept: RADIATION ONCOLOGY | Facility: HOSPITAL | Age: 65
End: 2020-06-25
Attending: RADIOLOGY
Payer: COMMERCIAL

## 2020-06-25 VITALS — BODY MASS INDEX: 30.27 KG/M2 | WEIGHT: 155 LBS

## 2020-06-25 DIAGNOSIS — D42.0 ATYPICAL INTRACRANIAL MENINGIOMA (HCC): ICD-10-CM

## 2020-06-25 DIAGNOSIS — C67.9 ADENOCARCINOMA OF BLADDER (HCC): Primary | ICD-10-CM

## 2020-06-25 DIAGNOSIS — D42.0 ATYPICAL INTRACRANIAL MENINGIOMA (HCC): Primary | ICD-10-CM

## 2020-06-25 PROCEDURE — G0463 HOSPITAL OUTPT CLINIC VISIT: HCPCS | Performed by: RADIOLOGY

## 2020-06-25 PROCEDURE — 99211 OFF/OP EST MAY X REQ PHY/QHP: CPT | Performed by: RADIOLOGY

## 2020-07-08 ENCOUNTER — OFFICE VISIT (OUTPATIENT)
Dept: FAMILY MEDICINE CLINIC | Facility: CLINIC | Age: 65
End: 2020-07-08
Payer: COMMERCIAL

## 2020-07-08 VITALS
DIASTOLIC BLOOD PRESSURE: 70 MMHG | SYSTOLIC BLOOD PRESSURE: 148 MMHG | OXYGEN SATURATION: 99 % | WEIGHT: 154 LBS | HEIGHT: 60 IN | HEART RATE: 73 BPM | TEMPERATURE: 97.4 F | BODY MASS INDEX: 30.23 KG/M2

## 2020-07-08 DIAGNOSIS — F43.21 GRIEF: ICD-10-CM

## 2020-07-08 DIAGNOSIS — Z87.891 FORMER SMOKER: ICD-10-CM

## 2020-07-08 DIAGNOSIS — Z13.220 SCREENING CHOLESTEROL LEVEL: ICD-10-CM

## 2020-07-08 DIAGNOSIS — I10 ESSENTIAL HYPERTENSION: ICD-10-CM

## 2020-07-08 DIAGNOSIS — E66.09 CLASS 1 OBESITY DUE TO EXCESS CALORIES WITH BODY MASS INDEX (BMI) OF 30.0 TO 30.9 IN ADULT, UNSPECIFIED WHETHER SERIOUS COMORBIDITY PRESENT: ICD-10-CM

## 2020-07-08 DIAGNOSIS — Z76.89 ENCOUNTER TO ESTABLISH CARE: Primary | ICD-10-CM

## 2020-07-08 DIAGNOSIS — Z11.3 SCREENING FOR STD (SEXUALLY TRANSMITTED DISEASE): ICD-10-CM

## 2020-07-08 DIAGNOSIS — Z00.00 ANNUAL PHYSICAL EXAM: ICD-10-CM

## 2020-07-08 PROBLEM — D03.59 MELANOMA IN SITU OF BACK (HCC): Status: ACTIVE | Noted: 2019-04-26

## 2020-07-08 PROCEDURE — 99203 OFFICE O/P NEW LOW 30 MIN: CPT | Performed by: FAMILY MEDICINE

## 2020-07-08 PROCEDURE — 3008F BODY MASS INDEX DOCD: CPT | Performed by: FAMILY MEDICINE

## 2020-07-08 PROCEDURE — 1036F TOBACCO NON-USER: CPT | Performed by: FAMILY MEDICINE

## 2020-07-08 PROCEDURE — 99386 PREV VISIT NEW AGE 40-64: CPT | Performed by: FAMILY MEDICINE

## 2020-07-08 RX ORDER — HYDROCHLOROTHIAZIDE 25 MG/1
25 TABLET ORAL DAILY
Qty: 30 TABLET | Refills: 0 | Status: SHIPPED | OUTPATIENT
Start: 2020-07-08 | End: 2022-04-29 | Stop reason: ALTCHOICE

## 2020-07-08 NOTE — PROGRESS NOTES
Assessment/Plan:   Diagnoses and all orders for this visit:    Encounter to establish care  Annual physical exam  - reviewed PMHx, PSHx, meds, allergies, FHx, Soc Hx and Sexual Hx   - pt bought her records from her former PCP's office - will review  - overdue for screening labs - script given  - overdue for annual GYN exam   - overdue for Mammo   - interested in STD screening   - UTD with Cscope in 2017 - 5yr f/u recommended   - UTD with Optho but overdue for Dental     Screening for STD (sexually transmitted disease)  -     HIV-1 RNA, quantitative, PCR; Future  -     RPR; Future  -     Chlamydia/GC amplified DNA by PCR; Future  -     Hepatitis panel, acute; Future    Screening cholesterol level  -     Lipid panel; Future    Essential hypertension  -     Comprehensive metabolic panel; Future  -     Microalbumin / creatinine urine ratio  -     hydrochlorothiazide (HYDRODIURIL) 25 mg tablet; Take 1 tablet (25 mg total) by mouth daily  - BP elevated in the office and other recent OV's   - advised to get screening labs, will start on diuretic and f/u in the office in 2wks - pt aware and agreeable   Former smoker    Grief  -     Ambulatory referral to [facility]; Future    Class 1 obesity due to excess calories with body mass index (BMI) of 30 0 to 30 9 in adult, unspecified whether serious comorbidity present  -     CBC and differential; Future  -     TSH, 3rd generation with Free T4 reflex  -     Vitamin D 25 hydroxy;  Future  - discussed diet and encouraged exercise  - educated that it takes 3500 emily to lose 1lb  - advised to cut down on carbs, 5 servings of fruits/veggies/day, increase water intake (65-80oz/water/day)   - appropriate weight loss goal for women = 0 5-1lb/week  - per the Heart Association - 150mins/exercise/week, but to lose and maintain weight 200-300mins/exercise/week     Other orders  -     Cancel: TDAP VACCINE GREATER THAN OR EQUAL TO 8YO IM          Subjective:    Patient ID: Maria T Den is a 60 y o  female  Bertin Arango is a 60 y o  female who presents to the office to establish care and for an annual exam  - prior PCP: Dr Rashel Denise, last OV was ~1yr ago   - PMHx: GERD, MILEY on CPAP, AVM, meningioma, melanoma in situ of back, adeno of bladder, memory difficulties  - allergies: NKDA  - Meds: see med rec   - PSHx: craniotomy, adenoma removed from liver, TURBT, tubal ligation   - FHx: F (brain ca), M (lung ca), Sister (melanoma), Brother (lung ca), Brother (prostate ca), MGM (ovarian ca), PGM (breast ca),   - Immunizations: awaiting records from former PCP   - GYN Hx: [facility], last OV was 8/2018  - Hm: Cscope in 2017 - 5yr f/u, Mammo 9/2018   - diet/exercise: does not exercise, diet: eggs daily, salad  - social: former smoker  - sexual Hx: interested in STD screening   - last vision: wears glasses, Dr Zurdo Thomas, goes annually   - last dental: >1yr, Dr Analy Calderon   - ROS: today in the office pt denies F/C/N/V/HA/visual changes/CP/palpitations/SOB/wheezing/cough/abd pain/D/LE edema or calf tenderness    The following portions of the patient's history were reviewed and updated as appropriate: allergies, current medications, past family history, past medical history, past social history, past surgical history and problem list     Review of Systems  as per HPI    Objective:  /70   Pulse 73   Temp (!) 97 4 °F (36 3 °C)   Ht 5' (1 524 m)   Wt 69 9 kg (154 lb)   SpO2 99%   BMI 30 08 kg/m²    Physical Exam   Constitutional: She is oriented to person, place, and time  She appears well-developed and well-nourished  No distress  HENT:   Head: Normocephalic and atraumatic  Right Ear: External ear normal    Left Ear: External ear normal    Eyes: Conjunctivae and EOM are normal  Right eye exhibits no discharge  Left eye exhibits no discharge  No scleral icterus  Neck: Normal range of motion  Neck supple  Cardiovascular: Normal rate, regular rhythm and normal heart sounds  Exam reveals no gallop and no friction rub  No murmur heard  Pulmonary/Chest: Effort normal and breath sounds normal  No stridor  No respiratory distress  She has no wheezes  She has no rales  Abdominal: Soft  Bowel sounds are normal    BMI 30 1   Musculoskeletal: Normal range of motion  She exhibits no edema, tenderness or deformity  Neurological: She is alert and oriented to person, place, and time  Skin: Skin is warm  She is not diaphoretic  Psychiatric:   Tearful when talking about her Sister who passed in 11/2019    Vitals reviewed  BMI Counseling: Body mass index is 30 08 kg/m²  The BMI is above normal  Nutrition recommendations include reducing portion sizes, decreasing overall calorie intake, consuming healthier snacks and reducing intake of cholesterol  Exercise recommendations include moderate aerobic physical activity for 150 minutes/week, exercising 3-5 times per week and joining a gym

## 2020-07-09 ENCOUNTER — LAB (OUTPATIENT)
Dept: LAB | Facility: CLINIC | Age: 65
End: 2020-07-09
Payer: COMMERCIAL

## 2020-07-09 DIAGNOSIS — Z13.220 SCREENING CHOLESTEROL LEVEL: ICD-10-CM

## 2020-07-09 DIAGNOSIS — E66.09 CLASS 1 OBESITY DUE TO EXCESS CALORIES WITH BODY MASS INDEX (BMI) OF 30.0 TO 30.9 IN ADULT, UNSPECIFIED WHETHER SERIOUS COMORBIDITY PRESENT: ICD-10-CM

## 2020-07-09 DIAGNOSIS — I10 ESSENTIAL HYPERTENSION: ICD-10-CM

## 2020-07-09 DIAGNOSIS — Z11.3 SCREENING FOR STD (SEXUALLY TRANSMITTED DISEASE): ICD-10-CM

## 2020-07-09 LAB
25(OH)D3 SERPL-MCNC: 25.2 NG/ML (ref 30–100)
ALBUMIN SERPL BCP-MCNC: 4.4 G/DL (ref 3.5–5)
ALP SERPL-CCNC: 129 U/L (ref 46–116)
ALT SERPL W P-5'-P-CCNC: 36 U/L (ref 12–78)
ANION GAP SERPL CALCULATED.3IONS-SCNC: 5 MMOL/L (ref 4–13)
AST SERPL W P-5'-P-CCNC: 18 U/L (ref 5–45)
BASOPHILS # BLD AUTO: 0.03 THOUSANDS/ΜL (ref 0–0.1)
BASOPHILS NFR BLD AUTO: 1 % (ref 0–1)
BILIRUB SERPL-MCNC: 0.49 MG/DL (ref 0.2–1)
BUN SERPL-MCNC: 17 MG/DL (ref 5–25)
CALCIUM SERPL-MCNC: 9.2 MG/DL (ref 8.3–10.1)
CHLORIDE SERPL-SCNC: 106 MMOL/L (ref 100–108)
CHOLEST SERPL-MCNC: 211 MG/DL (ref 50–200)
CO2 SERPL-SCNC: 28 MMOL/L (ref 21–32)
CREAT SERPL-MCNC: 0.83 MG/DL (ref 0.6–1.3)
CREAT UR-MCNC: 17 MG/DL
EOSINOPHIL # BLD AUTO: 0.11 THOUSAND/ΜL (ref 0–0.61)
EOSINOPHIL NFR BLD AUTO: 3 % (ref 0–6)
ERYTHROCYTE [DISTWIDTH] IN BLOOD BY AUTOMATED COUNT: 12.5 % (ref 11.6–15.1)
GFR SERPL CREATININE-BSD FRML MDRD: 76 ML/MIN/1.73SQ M
GLUCOSE P FAST SERPL-MCNC: 90 MG/DL (ref 65–99)
HAV IGM SER QL: NORMAL
HBV CORE IGM SER QL: NORMAL
HBV SURFACE AG SER QL: NORMAL
HCT VFR BLD AUTO: 43.6 % (ref 34.8–46.1)
HCV AB SER QL: NORMAL
HDLC SERPL-MCNC: 86 MG/DL
HGB BLD-MCNC: 13.9 G/DL (ref 11.5–15.4)
IMM GRANULOCYTES # BLD AUTO: 0.01 THOUSAND/UL (ref 0–0.2)
IMM GRANULOCYTES NFR BLD AUTO: 0 % (ref 0–2)
LDLC SERPL CALC-MCNC: 116 MG/DL (ref 0–100)
LYMPHOCYTES # BLD AUTO: 1.62 THOUSANDS/ΜL (ref 0.6–4.47)
LYMPHOCYTES NFR BLD AUTO: 40 % (ref 14–44)
MCH RBC QN AUTO: 28.9 PG (ref 26.8–34.3)
MCHC RBC AUTO-ENTMCNC: 31.9 G/DL (ref 31.4–37.4)
MCV RBC AUTO: 91 FL (ref 82–98)
MICROALBUMIN UR-MCNC: <5 MG/L (ref 0–20)
MICROALBUMIN/CREAT 24H UR: <29 MG/G CREATININE (ref 0–30)
MONOCYTES # BLD AUTO: 0.29 THOUSAND/ΜL (ref 0.17–1.22)
MONOCYTES NFR BLD AUTO: 7 % (ref 4–12)
NEUTROPHILS # BLD AUTO: 2.01 THOUSANDS/ΜL (ref 1.85–7.62)
NEUTS SEG NFR BLD AUTO: 49 % (ref 43–75)
NONHDLC SERPL-MCNC: 125 MG/DL
NRBC BLD AUTO-RTO: 0 /100 WBCS
PLATELET # BLD AUTO: 161 THOUSANDS/UL (ref 149–390)
PMV BLD AUTO: 12 FL (ref 8.9–12.7)
POTASSIUM SERPL-SCNC: 4.1 MMOL/L (ref 3.5–5.3)
PROT SERPL-MCNC: 7.8 G/DL (ref 6.4–8.2)
RBC # BLD AUTO: 4.81 MILLION/UL (ref 3.81–5.12)
SODIUM SERPL-SCNC: 139 MMOL/L (ref 136–145)
TRIGL SERPL-MCNC: 43 MG/DL
TSH SERPL DL<=0.05 MIU/L-ACNC: 0.69 UIU/ML (ref 0.36–3.74)
WBC # BLD AUTO: 4.07 THOUSAND/UL (ref 4.31–10.16)

## 2020-07-09 PROCEDURE — 82043 UR ALBUMIN QUANTITATIVE: CPT | Performed by: FAMILY MEDICINE

## 2020-07-09 PROCEDURE — 82570 ASSAY OF URINE CREATININE: CPT | Performed by: FAMILY MEDICINE

## 2020-07-09 PROCEDURE — 82306 VITAMIN D 25 HYDROXY: CPT

## 2020-07-09 PROCEDURE — 86592 SYPHILIS TEST NON-TREP QUAL: CPT

## 2020-07-09 PROCEDURE — 87536 HIV-1 QUANT&REVRSE TRNSCRPJ: CPT

## 2020-07-09 PROCEDURE — 84443 ASSAY THYROID STIM HORMONE: CPT | Performed by: FAMILY MEDICINE

## 2020-07-09 PROCEDURE — 80053 COMPREHEN METABOLIC PANEL: CPT

## 2020-07-09 PROCEDURE — 80061 LIPID PANEL: CPT

## 2020-07-09 PROCEDURE — 85025 COMPLETE CBC W/AUTO DIFF WBC: CPT

## 2020-07-09 PROCEDURE — 87591 N.GONORRHOEAE DNA AMP PROB: CPT

## 2020-07-09 PROCEDURE — 87491 CHLMYD TRACH DNA AMP PROBE: CPT

## 2020-07-09 PROCEDURE — 36415 COLL VENOUS BLD VENIPUNCTURE: CPT

## 2020-07-09 PROCEDURE — 80074 ACUTE HEPATITIS PANEL: CPT

## 2020-07-09 NOTE — PATIENT INSTRUCTIONS

## 2020-07-10 LAB
C TRACH DNA SPEC QL NAA+PROBE: NEGATIVE
N GONORRHOEA DNA SPEC QL NAA+PROBE: NEGATIVE
RPR SER QL: NORMAL

## 2020-07-11 LAB — HIV1 RNA # SERPL NAA+PROBE: NORMAL COPIES/ML

## 2020-07-22 ENCOUNTER — OFFICE VISIT (OUTPATIENT)
Dept: FAMILY MEDICINE CLINIC | Facility: CLINIC | Age: 65
End: 2020-07-22
Payer: COMMERCIAL

## 2020-07-22 VITALS
HEART RATE: 85 BPM | DIASTOLIC BLOOD PRESSURE: 60 MMHG | OXYGEN SATURATION: 98 % | BODY MASS INDEX: 29.76 KG/M2 | WEIGHT: 151.6 LBS | SYSTOLIC BLOOD PRESSURE: 118 MMHG | RESPIRATION RATE: 18 BRPM | TEMPERATURE: 98.2 F | HEIGHT: 60 IN

## 2020-07-22 DIAGNOSIS — E55.9 VITAMIN D INSUFFICIENCY: ICD-10-CM

## 2020-07-22 DIAGNOSIS — Z87.891 FORMER SMOKER: ICD-10-CM

## 2020-07-22 DIAGNOSIS — E66.3 OVERWEIGHT WITH BODY MASS INDEX (BMI) OF 29 TO 29.9 IN ADULT: ICD-10-CM

## 2020-07-22 DIAGNOSIS — I10 ESSENTIAL HYPERTENSION: Primary | ICD-10-CM

## 2020-07-22 PROCEDURE — 3008F BODY MASS INDEX DOCD: CPT | Performed by: FAMILY MEDICINE

## 2020-07-22 PROCEDURE — 3074F SYST BP LT 130 MM HG: CPT | Performed by: FAMILY MEDICINE

## 2020-07-22 PROCEDURE — 1036F TOBACCO NON-USER: CPT | Performed by: FAMILY MEDICINE

## 2020-07-22 PROCEDURE — 99214 OFFICE O/P EST MOD 30 MIN: CPT | Performed by: FAMILY MEDICINE

## 2020-07-22 PROCEDURE — 3078F DIAST BP <80 MM HG: CPT | Performed by: FAMILY MEDICINE

## 2020-07-22 NOTE — PROGRESS NOTES
Assessment/Plan:   Diagnoses and all orders for this visit:    Essential hypertension  - BP well controlled in the office today - likely with [place] HTN at last OV  - does have BP cuff - advised to bring with her to next Doctor's appt to discern if cuff is calibrated as has been getting lower BP readings at home   - advised to STOP HCTZ and check her pressures a few times a week - if with BP persistently >140/90 then to restart diuretic  - reviewed labs - nml STD panel, nml CBC/CMP/urine microalbumin/TSH  - , but 10yr ASCVD risk of 3 7% - discussed diet and encouraged lifestyle changes   - f/u in 3months - pt aware and agreeable   - interested in Tdap in the office today but forgot to get prior to leaving   will give at next OV  Former smoker    Vitamin D insufficiency  - advised OTC Vit D 2000IU QD     Overweight with body mass index (BMI) of 29 to 29 9 in adult  - BMI 29 6  - discussed diet and encouraged exercise  - educated that it takes 3500 emily to lose 1lb  - advised to cut down on carbs, 5 servings of fruits/veggies/day, increase water intake (65-80oz/water/day)   - appropriate weight loss goal for women = 0 5-1lb/week  - per the Heart Association - 150mins/exercise/week, but to lose and maintain weight 200-300mins/exercise/week   - RTO in 3months for f/u    Other orders  -     Cancel: TDAP VACCINE GREATER THAN OR EQUAL TO 6YO IM        Subjective:    Patient ID: Joe Patel is a 60 y o  female    67yo F presents to the office with her  for f/u  - started HCTZ on 7/17/2020 given elevated pressures   - pt states that she notes some heart burn with diuretic   - does have a BP cuff at home (forgot to bring to the office with her) - BP range at home: 110s-120s/60-70s  - BP in the office 132/80 (took HCTZ at noon today) and 118/60 on my repeat   - denies F/C/N/V/HA/dizziness/lightheadedness/change in vision/CP/palpitations/SOB/wheezing/abd pain/D/LE edema   - interested in Tdap in the office today   - reviewed essentially nml labs with exception of Vit D insufficiency   - pt wants to know her blood type = A+   - has lost some weight since her last OV - has stopped eating chips and started exercising     The following portions of the patient's history were reviewed and updated as appropriate: allergies, current medications, past family history, past medical history, past social history, past surgical history and problem list     Review of Systems  as per HPI    Objective:  /60   Pulse 85   Temp 98 2 °F (36 8 °C)   Resp 18   Ht 5' (1 524 m)   Wt 68 8 kg (151 lb 9 6 oz)   SpO2 98%   BMI 29 61 kg/m²    Physical Exam   Constitutional: She is oriented to person, place, and time  She appears well-developed and well-nourished  No distress  HENT:   Head: Normocephalic and atraumatic  Right Ear: External ear normal    Left Ear: External ear normal    Eyes: Conjunctivae and EOM are normal  Right eye exhibits no discharge  Left eye exhibits no discharge  No scleral icterus  Neck: Normal range of motion  Neck supple  Cardiovascular: Normal rate, regular rhythm and normal heart sounds  Exam reveals no gallop and no friction rub  No murmur heard  Pulmonary/Chest: Effort normal and breath sounds normal    Abdominal: Soft  Bowel sounds are normal    BMI 29 6    Musculoskeletal: Normal range of motion  She exhibits no edema, tenderness or deformity  Neurological: She is alert and oriented to person, place, and time  Skin: Skin is warm  She is not diaphoretic  Psychiatric: She has a normal mood and affect  Her behavior is normal    Vitals reviewed  BMI Counseling: Body mass index is 29 61 kg/m²  The BMI is above normal  Nutrition recommendations include reducing portion sizes, decreasing overall calorie intake and 3-5 servings of fruits/vegetables daily   Exercise recommendations include moderate aerobic physical activity for 150 minutes/week, exercising 3-5 times per week, joining a gym and strength training exercises

## 2020-08-06 ENCOUNTER — PROCEDURE VISIT (OUTPATIENT)
Dept: UROLOGY | Facility: AMBULATORY SURGERY CENTER | Age: 65
End: 2020-08-06
Payer: COMMERCIAL

## 2020-08-06 VITALS
HEART RATE: 70 BPM | HEIGHT: 60 IN | SYSTOLIC BLOOD PRESSURE: 118 MMHG | DIASTOLIC BLOOD PRESSURE: 86 MMHG | BODY MASS INDEX: 29.64 KG/M2 | TEMPERATURE: 97.8 F | WEIGHT: 151 LBS

## 2020-08-06 DIAGNOSIS — R35.0 URINARY FREQUENCY: ICD-10-CM

## 2020-08-06 DIAGNOSIS — C67.9 ADENOCARCINOMA OF BLADDER (HCC): Primary | ICD-10-CM

## 2020-08-06 LAB
SL AMB  POCT GLUCOSE, UA: ABNORMAL
SL AMB LEUKOCYTE ESTERASE,UA: ABNORMAL
SL AMB POCT BILIRUBIN,UA: ABNORMAL
SL AMB POCT BLOOD,UA: ABNORMAL
SL AMB POCT CLARITY,UA: CLEAR
SL AMB POCT COLOR,UA: YELLOW
SL AMB POCT KETONES,UA: ABNORMAL
SL AMB POCT NITRITE,UA: ABNORMAL
SL AMB POCT PH,UA: 6
SL AMB POCT SPECIFIC GRAVITY,UA: 1.01
SL AMB POCT URINE PROTEIN: ABNORMAL
SL AMB POCT UROBILINOGEN: ABNORMAL

## 2020-08-06 PROCEDURE — 81002 URINALYSIS NONAUTO W/O SCOPE: CPT | Performed by: UROLOGY

## 2020-08-06 PROCEDURE — 52000 CYSTOURETHROSCOPY: CPT | Performed by: UROLOGY

## 2020-08-06 PROCEDURE — 3008F BODY MASS INDEX DOCD: CPT | Performed by: FAMILY MEDICINE

## 2020-08-06 NOTE — PROGRESS NOTES
Cystoscopy    Date/Time: 8/6/2020 3:43 PM  Performed by: Pablo Francis MD  Authorized by: Pablo Francis MD     Procedure details: cystoscopy           Written Consent Obtained      Indications for Procedure:  History of low-grade bladder cancer with recent recurrence      Physical Exam     Constitutional   General appearance: No acute distress, well appearing and well nourished  Pulmonary   Respiratory effort: No increased work of breathing or signs of respiratory distress  Cardiovascular   Examination of extremities for edema and/or varicosities: Normal     Abdomen   Abdomen: Non-tender, no masses  Liver and spleen: No hepatomegaly or splenomegaly  Genitourinary   External genitalia and vagina: Normal, no lesions appreciated  Urethra: Normal, no discharge  Bladder: Not distended, no tenderness  Musculoskeletal   Gait and station: Normal     Skin   Skin and subcutaneous tissue: Normal without rashes or lesions  Lymphatic   Palpation of lymph nodes in groin: No lymphadenopathy  Additional Exam: Neuro exam nonfocal             The flexible cystoscope was introduced into the urethra and advanced into the bladder      URETHRA:  Normal,  without strictures or lesions  TRIGONE & UOs:   Normal anatomy with efflux of clear urine  No mucosal lesions or subtrigonal masses  BLADDER MUCOSA:  Normal, without neoplasms or other lesions  DETRUSOR: Normal capacity, without flaccidity, without excessive compliance, without trabeculation or diverticula, without uninhibited bladder contractions on fillings  RETROFLEXED SCOPE VIEW: Normal bladder neck     Plan:  Surveillance cystoscopy is negative  She will follow up in 3 months for repeat cystoscopy    Myrbetriq was refilled

## 2020-10-28 ENCOUNTER — OFFICE VISIT (OUTPATIENT)
Dept: FAMILY MEDICINE CLINIC | Facility: CLINIC | Age: 65
End: 2020-10-28
Payer: COMMERCIAL

## 2020-10-28 VITALS
BODY MASS INDEX: 29.45 KG/M2 | DIASTOLIC BLOOD PRESSURE: 80 MMHG | HEART RATE: 64 BPM | WEIGHT: 150 LBS | OXYGEN SATURATION: 98 % | HEIGHT: 60 IN | TEMPERATURE: 97.8 F | SYSTOLIC BLOOD PRESSURE: 140 MMHG | RESPIRATION RATE: 16 BRPM

## 2020-10-28 DIAGNOSIS — Z87.891 FORMER SMOKER: ICD-10-CM

## 2020-10-28 DIAGNOSIS — Z23 NEED FOR DIPHTHERIA-TETANUS-PERTUSSIS (TDAP) VACCINE: ICD-10-CM

## 2020-10-28 DIAGNOSIS — Z23 NEED FOR INFLUENZA VACCINATION: ICD-10-CM

## 2020-10-28 DIAGNOSIS — I10 ESSENTIAL HYPERTENSION: Primary | ICD-10-CM

## 2020-10-28 PROCEDURE — 99213 OFFICE O/P EST LOW 20 MIN: CPT | Performed by: FAMILY MEDICINE

## 2020-10-28 PROCEDURE — 90715 TDAP VACCINE 7 YRS/> IM: CPT | Performed by: FAMILY MEDICINE

## 2020-10-28 PROCEDURE — 90682 RIV4 VACC RECOMBINANT DNA IM: CPT | Performed by: FAMILY MEDICINE

## 2020-10-28 PROCEDURE — 90471 IMMUNIZATION ADMIN: CPT | Performed by: FAMILY MEDICINE

## 2020-10-28 PROCEDURE — 90472 IMMUNIZATION ADMIN EACH ADD: CPT | Performed by: FAMILY MEDICINE

## 2020-11-12 ENCOUNTER — PROCEDURE VISIT (OUTPATIENT)
Dept: UROLOGY | Facility: AMBULATORY SURGERY CENTER | Age: 65
End: 2020-11-12
Payer: COMMERCIAL

## 2020-11-12 VITALS
HEART RATE: 70 BPM | TEMPERATURE: 96.9 F | SYSTOLIC BLOOD PRESSURE: 128 MMHG | HEIGHT: 60 IN | WEIGHT: 153 LBS | BODY MASS INDEX: 30.04 KG/M2 | DIASTOLIC BLOOD PRESSURE: 72 MMHG

## 2020-11-12 DIAGNOSIS — C67.9 ADENOCARCINOMA OF BLADDER (HCC): Primary | ICD-10-CM

## 2020-11-12 LAB
SL AMB  POCT GLUCOSE, UA: NORMAL
SL AMB LEUKOCYTE ESTERASE,UA: NORMAL
SL AMB POCT BILIRUBIN,UA: NORMAL
SL AMB POCT BLOOD,UA: NORMAL
SL AMB POCT CLARITY,UA: CLEAR
SL AMB POCT COLOR,UA: YELLOW
SL AMB POCT KETONES,UA: NORMAL
SL AMB POCT NITRITE,UA: NORMAL
SL AMB POCT PH,UA: 7.5
SL AMB POCT SPECIFIC GRAVITY,UA: 1
SL AMB POCT URINE PROTEIN: NORMAL
SL AMB POCT UROBILINOGEN: 0.2

## 2020-11-12 PROCEDURE — 52000 CYSTOURETHROSCOPY: CPT | Performed by: UROLOGY

## 2020-11-12 PROCEDURE — 99213 OFFICE O/P EST LOW 20 MIN: CPT | Performed by: UROLOGY

## 2020-11-12 PROCEDURE — 1036F TOBACCO NON-USER: CPT | Performed by: UROLOGY

## 2020-11-12 PROCEDURE — 3008F BODY MASS INDEX DOCD: CPT | Performed by: UROLOGY

## 2020-11-12 PROCEDURE — 81002 URINALYSIS NONAUTO W/O SCOPE: CPT | Performed by: UROLOGY

## 2020-11-12 PROCEDURE — 3074F SYST BP LT 130 MM HG: CPT | Performed by: UROLOGY

## 2020-11-12 PROCEDURE — 3078F DIAST BP <80 MM HG: CPT | Performed by: UROLOGY

## 2021-03-24 ENCOUNTER — TELEPHONE (OUTPATIENT)
Dept: UROLOGY | Facility: AMBULATORY SURGERY CENTER | Age: 66
End: 2021-03-24

## 2021-05-20 ENCOUNTER — OFFICE VISIT (OUTPATIENT)
Dept: PULMONOLOGY | Facility: MEDICAL CENTER | Age: 66
End: 2021-05-20
Payer: COMMERCIAL

## 2021-05-20 VITALS
TEMPERATURE: 98.1 F | WEIGHT: 152 LBS | HEART RATE: 89 BPM | HEIGHT: 60 IN | RESPIRATION RATE: 12 BRPM | DIASTOLIC BLOOD PRESSURE: 66 MMHG | OXYGEN SATURATION: 98 % | SYSTOLIC BLOOD PRESSURE: 128 MMHG | BODY MASS INDEX: 29.84 KG/M2

## 2021-05-20 DIAGNOSIS — G47.33 OBSTRUCTIVE SLEEP APNEA: Primary | ICD-10-CM

## 2021-05-20 PROCEDURE — 99213 OFFICE O/P EST LOW 20 MIN: CPT | Performed by: INTERNAL MEDICINE

## 2021-05-20 PROCEDURE — 1036F TOBACCO NON-USER: CPT | Performed by: INTERNAL MEDICINE

## 2021-05-20 PROCEDURE — 3008F BODY MASS INDEX DOCD: CPT | Performed by: INTERNAL MEDICINE

## 2021-05-20 PROCEDURE — 3074F SYST BP LT 130 MM HG: CPT | Performed by: INTERNAL MEDICINE

## 2021-05-20 PROCEDURE — 3078F DIAST BP <80 MM HG: CPT | Performed by: INTERNAL MEDICINE

## 2021-05-20 NOTE — PROGRESS NOTES
Assessment/Plan        Problem List Items Addressed This Visit        Respiratory    Obstructive sleep apnea - Primary      Mild obstructive sleep apnea with good compliance to CPAP therapy  She is on auto CPAP with pressure range of 4-20 cm water with a [employer] nasal cushion interface  I reviewed compliance data from past month and she has been using the CPAP on regular basis  She has benefited from it  Not having any nocturnal dyspnea  Her overall AHI was 2 0  Average CPAP pressure was 8 cm water  I will make slight adjustment on auto CPAP range and change to 5 to 20 cm water  She presently has the [employer]ear nasal cushion mask but was interested in trying a different 1  I recommended the [employer] fit F 30 mask  She will discuss this with [facility]          Relevant Orders    PAP DME Pressure Change    PAP DME Resupply/Reorder            CC:  Doing okay,  No problems with her CPAP machine      HPI     Rashmi  has mild MILEY  Diagnostic sleep study done December 2017 showed AHI of 7 1 which increased to 23 during REM sleep  She is using auto CPAP set at pressure of 4-20 cm water  She has been compliant using her CPAP and reviewed her compliance data from past 1 month show she averages 5 5 hours per night  Her resulting AHI was 2 0 which is satisfactory  Average CPAP pressure was 8 cm water  Her present CPAP machine  was issued on 02/18/2019 via [employer]  She does use the Dream[employer]ear nasal cushion  Interface which she thinks he may want to try a different mask  Sometimes this does slight of her nose  She usually does have some mild daytime fatigue predicted when she takes a car ride  She does overall though feel better with use of her CPAP machine  Not having any nocturnal dyspnea  She does get some mild shortness of breath sometimes with exertional activity        She did get her 1st dose of COVID Moderna vaccine at [facility] and is scheduled for her second dose there today        Past Medical History:   Diagnosis Date    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[address] )     CPAP (continuous positive airway pressure) dependence     GERD (gastroesophageal reflux disease)     History of arteriovenous malformation     Liver problem     Sleep apnea        Past Surgical History:   Procedure Laterality Date    BRAIN SURGERY      COLONOSCOPY      CRANIOTOMY      LIVER SURGERY      adenoma removed from liver    MA COLONOSCOPY FLX DX W/COLLJ SPEC WHEN PFRMD N/A 3/20/2017    Procedure: EGD AND COLONOSCOPY;  Surgeon: Joe Streeter MD;  Location: [facility]; Service: Gastroenterology    MA CYSTOURETHROSCOPY,FULGUR <0 5 CM LESN N/A 2019    Procedure: TRANSURETHRAL RESECTION OF BLADDER TUMOR (TURBT);   Surgeon: Demarco Romano MD;  Location[facility];  Service: Urology    TUBAL LIGATION           Current Outpatient Medications:     acetaminophen (TYLENOL) 500 mg tablet, Take 1 tablet by mouth, Disp: , Rfl:     Ascorbic Acid (VITAMIN C GUMMIES PO), Take by mouth 2 gummies a day, Disp: , Rfl:     B Complex-C CAPS, Take 1 capsule by mouth daily, Disp: , Rfl:     CALCIUM-MAGNESIUM-ZINC PO, Take by mouth daily, Disp: , Rfl:     Collagen 500 MG CAPS, Take by mouth, Disp: , Rfl:     LORATADINE ALLERGY RELIEF PO, Take by mouth, Disp: , Rfl:     Mirabegron ER 50 MG TB24, Take 1 tablet (50 mg total) by mouth daily, Disp: 90 tablet, Rfl: 3    hydrochlorothiazide (HYDRODIURIL) 25 mg tablet, Take 1 tablet (25 mg total) by mouth daily (Patient not taking: Reported on 2021), Disp: 30 tablet, Rfl: 0    No Known Allergies    Social History     Tobacco Use    Smoking status: Former Smoker     Packs/day: 3 00     Years: 20 00     Pack years: 60 00     Quit date:      Years since quittin 4    Smokeless tobacco: Never Used   Substance Use Topics    Alcohol use: Yes     Frequency: Monthly or less     Drinks per session: 1 or 2     Comment: occasional         Family History   Problem Relation Age of Onset   [employer] Brain cancer Father     Lung cancer Mother     Melanoma Sister     Lung cancer Brother     Ovarian cancer Maternal Grandmother     Breast cancer Paternal Grandmother     Prostate cancer Brother     Diabetes Son     Obesity Daughter     Obesity Son     Asthma Son        Review of Systems   Constitutional: Positive for fatigue  Negative for chills, fever and unexpected weight change  HENT: Negative for congestion, rhinorrhea and sore throat  Does get some nasal congestion and postnasal drainage at times   Eyes: Negative for discharge and redness  Respiratory:        Has some mild exertional shortness of breath   Cardiovascular: Negative for chest pain, palpitations and leg swelling  Gastrointestinal: Negative for abdominal distention, abdominal pain and nausea  Endocrine: Negative for polydipsia and polyphagia  Genitourinary: Negative for dysuria  Musculoskeletal: Negative for joint swelling and myalgias  Skin: Negative for rash  Neurological: Negative for light-headedness  Psychiatric/Behavioral: Negative for confusion  Vitals:    05/20/21 1022   BP: 128/66   Pulse: 89   Resp: 12   Temp: 98 1 °F (36 7 °C)   SpO2: 98%           Physical Exam  Vitals signs reviewed  Constitutional:       General: She is not in acute distress  Appearance: Normal appearance  She is well-developed  HENT:      Head: Normocephalic  Nose: Nose normal       Mouth/Throat:      Mouth: Mucous membranes are moist       Pharynx: Oropharynx is clear  No oropharyngeal exudate  Comments: Mallampati score is 2  Eyes:      Conjunctiva/sclera: Conjunctivae normal       Pupils: Pupils are equal, round, and reactive to light  Neck:      Musculoskeletal: Neck supple  Cardiovascular:      Rate and Rhythm: Normal rate and regular rhythm  Heart sounds: Normal heart sounds     Pulmonary:      Effort: Pulmonary effort is normal    Abdominal: General: There is no distension  Palpations: Abdomen is soft  Tenderness: There is no abdominal tenderness  Musculoskeletal:      Comments: No edema, cyanosis or clubbing   Skin:     General: Skin is warm and dry  Neurological:      Mental Status: She is alert and oriented to person, place, and time  Psychiatric:         Mood and Affect: Mood normal          Behavior: Behavior normal          Thought Content:  Thought content normal

## 2021-05-20 NOTE — ASSESSMENT & PLAN NOTE
Mild obstructive sleep apnea with good compliance to CPAP therapy  She is on auto CPAP with pressure range of 4-20 cm water with a [employer] nasal cushion interface  I reviewed compliance data from past month and she has been using the CPAP on regular basis  She has benefited from it  Not having any nocturnal dyspnea  Her overall AHI was 2 0  Average CPAP pressure was 8 cm water  I will make slight adjustment on auto CPAP range and change to 5 to 20 cm water  She presently has the [employer] nasal cushion mask but was interested in trying a different 1  I recommended the [employer] fit F 30 mask    She will discuss this with [facility]

## 2021-06-21 ENCOUNTER — PROCEDURE VISIT (OUTPATIENT)
Dept: UROLOGY | Facility: AMBULATORY SURGERY CENTER | Age: 66
End: 2021-06-21
Payer: COMMERCIAL

## 2021-06-21 VITALS
DIASTOLIC BLOOD PRESSURE: 80 MMHG | HEIGHT: 60 IN | HEART RATE: 86 BPM | SYSTOLIC BLOOD PRESSURE: 128 MMHG | WEIGHT: 155 LBS | BODY MASS INDEX: 30.43 KG/M2

## 2021-06-21 DIAGNOSIS — C67.2 MALIGNANT NEOPLASM OF LATERAL WALL OF URINARY BLADDER (HCC): ICD-10-CM

## 2021-06-21 DIAGNOSIS — R35.0 URINARY FREQUENCY: ICD-10-CM

## 2021-06-21 DIAGNOSIS — C67.9 ADENOCARCINOMA OF BLADDER (HCC): Primary | ICD-10-CM

## 2021-06-21 LAB
SL AMB  POCT GLUCOSE, UA: NORMAL
SL AMB LEUKOCYTE ESTERASE,UA: NORMAL
SL AMB POCT BILIRUBIN,UA: NORMAL
SL AMB POCT BLOOD,UA: NORMAL
SL AMB POCT CLARITY,UA: CLEAR
SL AMB POCT COLOR,UA: YELLOW
SL AMB POCT KETONES,UA: NORMAL
SL AMB POCT NITRITE,UA: NORMAL
SL AMB POCT PH,UA: 5
SL AMB POCT SPECIFIC GRAVITY,UA: 1.01
SL AMB POCT URINE PROTEIN: NORMAL
SL AMB POCT UROBILINOGEN: NORMAL

## 2021-06-21 PROCEDURE — 3008F BODY MASS INDEX DOCD: CPT | Performed by: INTERNAL MEDICINE

## 2021-06-21 PROCEDURE — 88112 CYTOPATH CELL ENHANCE TECH: CPT | Performed by: PATHOLOGY

## 2021-06-21 PROCEDURE — 52000 CYSTOURETHROSCOPY: CPT | Performed by: UROLOGY

## 2021-06-21 PROCEDURE — 81002 URINALYSIS NONAUTO W/O SCOPE: CPT | Performed by: UROLOGY

## 2021-06-21 NOTE — PROGRESS NOTES
Cystoscopy     Date/Time 6/21/2021 8:32 AM     Performed by  Jeb Walton MD     Authorized by Jeb Walton MD           Dulce Ramachandran  Is a 72-year-old female with a history of low-grade superficial bladder cancer  Her last bladder tumor was in December 2019  She returns for routine surveillance cystoscopy today  Cytology was not performed prior to today's office visit and was sent from the office today  Her last upper tract imaging was a CT renal protocol performed in November 2019  She denies seen any gross hematuria  She denies any lower urinary tract symptoms  She states that she quit smoking approximately 22 years ago but had smoked prior to that for at least 25 years  Her  is present with her in the office today  Cystoscopy Procedure note    Risk and benefits of flexible cystoscopy were discussed  Informed consent was obtained  A urine dip is adequate for cystoscopy  The patient was placed into the modified supine position  Her genitalia was prepped and draped in a sterile fashion  Viscous lidocaine was instilled into the urethra  Flexible cystoscopy was then performed  The bladder was thoroughly inspected  Both ureteral orifices were visualized with clear efflux of urine  The bladder mucosa was thoroughly inspected  There was no evidence of mucosal abnormalities, stones, or lesions  Retroflexion was normal   Overall this was a negative cystoscopy  There is no evidence of recurrent urothelial carcinoma the bladder identified  A female office staff member was present throughout the entire procedure  impression: 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

## 2021-07-15 ENCOUNTER — TELEPHONE (OUTPATIENT)
Dept: PULMONOLOGY | Facility: CLINIC | Age: 66
End: 2021-07-15

## 2021-07-15 NOTE — TELEPHONE ENCOUNTER
I spoke with Ralph Gama    Her CPAP machine is on recall list but seems to be working fine and no odor or black particles    She will continue to use machine for now, she did register with [employer] already

## 2021-07-15 NOTE — TELEPHONE ENCOUNTER
Kelsy Lindsay would like to know if she should continue use of her cpap until she gets a new one from [facility]

## 2022-04-01 ENCOUNTER — OFFICE VISIT (OUTPATIENT)
Dept: FAMILY MEDICINE CLINIC | Facility: CLINIC | Age: 67
End: 2022-04-01
Payer: COMMERCIAL

## 2022-04-01 ENCOUNTER — APPOINTMENT (OUTPATIENT)
Dept: LAB | Facility: CLINIC | Age: 67
End: 2022-04-01
Payer: COMMERCIAL

## 2022-04-01 VITALS
BODY MASS INDEX: 30.43 KG/M2 | OXYGEN SATURATION: 99 % | SYSTOLIC BLOOD PRESSURE: 136 MMHG | HEART RATE: 83 BPM | WEIGHT: 155 LBS | DIASTOLIC BLOOD PRESSURE: 70 MMHG | HEIGHT: 60 IN | RESPIRATION RATE: 16 BRPM

## 2022-04-01 DIAGNOSIS — K63.5 POLYP OF COLON, UNSPECIFIED PART OF COLON, UNSPECIFIED TYPE: ICD-10-CM

## 2022-04-01 DIAGNOSIS — I10 ESSENTIAL HYPERTENSION: ICD-10-CM

## 2022-04-01 DIAGNOSIS — Z13.220 SCREENING CHOLESTEROL LEVEL: ICD-10-CM

## 2022-04-01 DIAGNOSIS — Z87.891 FORMER SMOKER: ICD-10-CM

## 2022-04-01 DIAGNOSIS — Z12.4 CERVICAL CANCER SCREENING: ICD-10-CM

## 2022-04-01 DIAGNOSIS — Z11.3 SCREENING FOR STD (SEXUALLY TRANSMITTED DISEASE): ICD-10-CM

## 2022-04-01 DIAGNOSIS — Z12.31 ENCOUNTER FOR SCREENING MAMMOGRAM FOR MALIGNANT NEOPLASM OF BREAST: ICD-10-CM

## 2022-04-01 DIAGNOSIS — Z00.00 ANNUAL PHYSICAL EXAM: Primary | ICD-10-CM

## 2022-04-01 DIAGNOSIS — Z13.0 SCREENING FOR DEFICIENCY ANEMIA: ICD-10-CM

## 2022-04-01 DIAGNOSIS — E55.9 VITAMIN D INSUFFICIENCY: ICD-10-CM

## 2022-04-01 LAB
25(OH)D3 SERPL-MCNC: 45.7 NG/ML (ref 30–100)
ALBUMIN SERPL BCP-MCNC: 3.9 G/DL (ref 3.5–5)
ALP SERPL-CCNC: 134 U/L (ref 46–116)
ALT SERPL W P-5'-P-CCNC: 39 U/L (ref 12–78)
ANION GAP SERPL CALCULATED.3IONS-SCNC: 4 MMOL/L (ref 4–13)
AST SERPL W P-5'-P-CCNC: 17 U/L (ref 5–45)
BASOPHILS # BLD AUTO: 0.03 THOUSANDS/ΜL (ref 0–0.1)
BASOPHILS NFR BLD AUTO: 1 % (ref 0–1)
BILIRUB SERPL-MCNC: 0.35 MG/DL (ref 0.2–1)
BUN SERPL-MCNC: 22 MG/DL (ref 5–25)
CALCIUM SERPL-MCNC: 9.3 MG/DL (ref 8.3–10.1)
CHLORIDE SERPL-SCNC: 108 MMOL/L (ref 100–108)
CHOLEST SERPL-MCNC: 211 MG/DL
CO2 SERPL-SCNC: 29 MMOL/L (ref 21–32)
CREAT SERPL-MCNC: 0.85 MG/DL (ref 0.6–1.3)
CREAT UR-MCNC: 104 MG/DL
EOSINOPHIL # BLD AUTO: 0.16 THOUSAND/ΜL (ref 0–0.61)
EOSINOPHIL NFR BLD AUTO: 4 % (ref 0–6)
ERYTHROCYTE [DISTWIDTH] IN BLOOD BY AUTOMATED COUNT: 12.8 % (ref 11.6–15.1)
GFR SERPL CREATININE-BSD FRML MDRD: 72 ML/MIN/1.73SQ M
GLUCOSE P FAST SERPL-MCNC: 96 MG/DL (ref 65–99)
HAV IGM SER QL: NORMAL
HBV CORE IGM SER QL: NORMAL
HBV SURFACE AG SER QL: NORMAL
HCT VFR BLD AUTO: 38.6 % (ref 34.8–46.1)
HCV AB SER QL: NORMAL
HDLC SERPL-MCNC: 80 MG/DL
HGB BLD-MCNC: 12.3 G/DL (ref 11.5–15.4)
IMM GRANULOCYTES # BLD AUTO: 0.01 THOUSAND/UL (ref 0–0.2)
IMM GRANULOCYTES NFR BLD AUTO: 0 % (ref 0–2)
LDLC SERPL CALC-MCNC: 123 MG/DL (ref 0–100)
LYMPHOCYTES # BLD AUTO: 1.57 THOUSANDS/ΜL (ref 0.6–4.47)
LYMPHOCYTES NFR BLD AUTO: 39 % (ref 14–44)
MCH RBC QN AUTO: 28.3 PG (ref 26.8–34.3)
MCHC RBC AUTO-ENTMCNC: 31.9 G/DL (ref 31.4–37.4)
MCV RBC AUTO: 89 FL (ref 82–98)
MICROALBUMIN UR-MCNC: 5.3 MG/L (ref 0–20)
MICROALBUMIN/CREAT 24H UR: 5 MG/G CREATININE (ref 0–30)
MONOCYTES # BLD AUTO: 0.3 THOUSAND/ΜL (ref 0.17–1.22)
MONOCYTES NFR BLD AUTO: 8 % (ref 4–12)
NEUTROPHILS # BLD AUTO: 1.94 THOUSANDS/ΜL (ref 1.85–7.62)
NEUTS SEG NFR BLD AUTO: 48 % (ref 43–75)
NONHDLC SERPL-MCNC: 131 MG/DL
NRBC BLD AUTO-RTO: 0 /100 WBCS
PLATELET # BLD AUTO: 185 THOUSANDS/UL (ref 149–390)
PMV BLD AUTO: 11.1 FL (ref 8.9–12.7)
POTASSIUM SERPL-SCNC: 4.1 MMOL/L (ref 3.5–5.3)
PROT SERPL-MCNC: 7.4 G/DL (ref 6.4–8.2)
RBC # BLD AUTO: 4.34 MILLION/UL (ref 3.81–5.12)
SODIUM SERPL-SCNC: 141 MMOL/L (ref 136–145)
TRIGL SERPL-MCNC: 42 MG/DL
TSH SERPL DL<=0.05 MIU/L-ACNC: 1.06 UIU/ML (ref 0.36–3.74)
WBC # BLD AUTO: 4.01 THOUSAND/UL (ref 4.31–10.16)

## 2022-04-01 PROCEDURE — 80061 LIPID PANEL: CPT

## 2022-04-01 PROCEDURE — 87389 HIV-1 AG W/HIV-1&-2 AB AG IA: CPT

## 2022-04-01 PROCEDURE — 99396 PREV VISIT EST AGE 40-64: CPT | Performed by: FAMILY MEDICINE

## 2022-04-01 PROCEDURE — 84443 ASSAY THYROID STIM HORMONE: CPT | Performed by: FAMILY MEDICINE

## 2022-04-01 PROCEDURE — 3725F SCREEN DEPRESSION PERFORMED: CPT | Performed by: FAMILY MEDICINE

## 2022-04-01 PROCEDURE — 86592 SYPHILIS TEST NON-TREP QUAL: CPT

## 2022-04-01 PROCEDURE — 87591 N.GONORRHOEAE DNA AMP PROB: CPT

## 2022-04-01 PROCEDURE — 85025 COMPLETE CBC W/AUTO DIFF WBC: CPT

## 2022-04-01 PROCEDURE — 99213 OFFICE O/P EST LOW 20 MIN: CPT | Performed by: FAMILY MEDICINE

## 2022-04-01 PROCEDURE — 82306 VITAMIN D 25 HYDROXY: CPT

## 2022-04-01 PROCEDURE — 82570 ASSAY OF URINE CREATININE: CPT | Performed by: FAMILY MEDICINE

## 2022-04-01 PROCEDURE — 82043 UR ALBUMIN QUANTITATIVE: CPT | Performed by: FAMILY MEDICINE

## 2022-04-01 PROCEDURE — 87491 CHLMYD TRACH DNA AMP PROBE: CPT

## 2022-04-01 PROCEDURE — 80053 COMPREHEN METABOLIC PANEL: CPT

## 2022-04-01 PROCEDURE — 80074 ACUTE HEPATITIS PANEL: CPT

## 2022-04-01 PROCEDURE — 36415 COLL VENOUS BLD VENIPUNCTURE: CPT

## 2022-04-01 NOTE — PATIENT INSTRUCTIONS

## 2022-04-01 NOTE — PROGRESS NOTES
Assessment/Plan:   Diagnoses and all orders for this visit:    Essential hypertension  -     Comprehensive metabolic panel; Future  -     Microalbumin / creatinine urine ratio  - BP in the office 136/70   - stopped taking HCTZ  - will check labs and advised to schedule f/u for BP check in 1month - pt and  aware and agreeable   Former smoker    Vitamin D insufficiency  -     Vitamin D 25 hydroxy; Future    BMI 30 0-30 9,adult  -     TSH, 3rd generation with Free T4 reflex  - BMI 30 3  - discussed diet and encouraged exercise  - educated that it takes 3500 emily to lose 1lb  - advised to cut down on carbs, 5 servings of fruits/veggies/day, increase water intake (65-80oz/water/day)   - appropriate weight loss goal for women = 0 5-1lb/week  - per the Heart Association - 150mins/exercise/week, but to lose and maintain weight 200-300mins/exercise/week     Screening cholesterol level  -     Lipid panel; Future  - The 10-year ASCVD risk score (Ann Trejo et al , 2013) is: 6 5%    Values used to calculate the score:      Age: 61 years      Sex: Female      Is Non- : No      Diabetic: No      Tobacco smoker: No      Systolic Blood Pressure: 309 mmHg      Is BP treated: Yes      HDL Cholesterol: 80 mg/dL      Total Cholesterol: 211 mg/dL    Polyp of colon, unspecified part of colon, unspecified type  -     Ambulatory Referral to Gastroenterology; Future  - last Cscope was in 2017 with 5yr recall 2/2 h/o colon polyps - referral given for GI     Encounter for screening mammogram for malignant neoplasm of breast  -     Mammo screening bilateral w 3d & cad; Future  Cervical cancer screening  -     Ambulatory Referral to Obstetrics / Gynecology; Future  - (+) FHx of Ovarian CA in MGM   - (+) FHx of Breast Ca in PGM           Subjective:    Patient ID: Endy Ryder is a 61 y o  female    Endy Ryder is a 61 y o  female who presents to the office with her  for an annual exam  - PMHx: GERD, MILEY on CPAP, AVM, meningioma, melanoma in situ of back, adeno of bladder, memory difficulties, h/o COVID (12/2021)   - allergies: NKDA  - Meds: see med rec   - PSHx: craniotomy, adenoma removed from liver, TURBT, tubal ligation   - FHx: F (brain ca), M (lung ca), Sister (melanoma), Brother (lung ca), Brother (prostate ca), MGM (ovarian ca), PGM (breast ca)  - Immunizations: Tdap, UTD with COVID IMMs and Booster  - GYN Hx: [facility], last OV was 8/2018  - Hm: Cscope in 2017 - 5yr f/u (2022) - due, Mammo 9/2018   - diet/exercise: does not exercise, diet: eggs daily, salad  - social: former smoker (quit in 1999, 2-3PPD/30yrs), social EtOH   - sexual Hx: interested in STD screening   - last vision: wears glasses, Dr Jacques Gill, goes annually   - last dental: >1yr, Dr Howard Dire   - ROS: today in the office pt denies F/C/N/V/HA/visual changes/CP/palpitations/SOB/wheezing/cough/abd pain/D/LE edema       The following portions of the patient's history were reviewed and updated as appropriate: allergies, current medications, past family history, past medical history, past social history, past surgical history and problem list     Review of Systems  as per HPI    Objective:  /70 (BP Location: Left arm, Patient Position: Sitting, Cuff Size: Large)   Pulse 83   Resp 16   Ht 5' (1 524 m)   Wt 70 3 kg (155 lb)   SpO2 99%   BMI 30 27 kg/m²    Physical Exam  Vitals reviewed  Constitutional:       General: She is not in acute distress  Appearance: Normal appearance  She is not ill-appearing, toxic-appearing or diaphoretic  HENT:      Head: Normocephalic and atraumatic  Right Ear: External ear normal       Left Ear: External ear normal    Eyes:      General: No scleral icterus  Right eye: No discharge  Left eye: No discharge  Extraocular Movements: Extraocular movements intact        Conjunctiva/sclera: Conjunctivae normal    Cardiovascular:      Rate and Rhythm: Normal rate and regular rhythm  Heart sounds: Normal heart sounds  No murmur heard  No friction rub  No gallop  Pulmonary:      Effort: Pulmonary effort is normal  No respiratory distress  Breath sounds: Normal breath sounds  No stridor  No wheezing, rhonchi or rales  Abdominal:      General: Bowel sounds are normal       Palpations: Abdomen is soft  Musculoskeletal:         General: Normal range of motion  Cervical back: Normal range of motion and neck supple  Right lower leg: No edema  Left lower leg: No edema  Skin:     General: Skin is warm  Neurological:      General: No focal deficit present  Mental Status: She is alert and oriented to person, place, and time  Psychiatric:         Mood and Affect: Mood normal          Behavior: Behavior normal        BMI Counseling: Body mass index is 30 27 kg/m²  The BMI is above normal  Nutrition recommendations include decreasing overall calorie intake  Exercise recommendations include exercising 3-5 times per week  Depression Screening Follow-up Plan: Patient's depression screening was positive with a PHQ-2 score of   Their PHQ-9 score was 0  Clinically patient does not have depression  No treatment is required

## 2022-04-01 NOTE — PROGRESS NOTES
Assessment/Plan:   Diagnoses and all orders for this visit:    Annual physical exam  - reviewed PMHx, PSHx, meds, allergies, FHx, Soc Hx and Sexual Hx   - due for screening labs - script given   - interested in STD screening - script given   - UTD with Tdap   - UTD with COVID IMMs and Booster  - no Flu vaccine this season   - discussed diet and encouraged exercise  - overdue for annual GYN exam, PAP and Mammo - referral and script given   - last Cscope was in 2017 with 5yr recall 2/2 h/o colon polyps - referral given for GI   - UTD with Optho   - overdue for Dental   - RTO in 1yr for annual exam     Screening for STD (sexually transmitted disease)  -     HIV 1/2 Antigen/Antibody (4th Generation) w Reflex [facility]; Future  -     RPR; Future  -     Hepatitis panel, acute; Future  -     Chlamydia/GC amplified DNA by PCR; Future    Essential hypertension  -     Comprehensive metabolic panel; Future  -     Microalbumin / creatinine urine ratio  - BP in the office 136/70   - stopped taking HCTZ  - will check labs and advised to schedule f/u for BP check in 1month - pt and  aware and agreeable   Former smoker    Vitamin D insufficiency  -     Vitamin D 25 hydroxy; Future    BMI 30 0-30 9,adult  -     TSH, 3rd generation with Free T4 reflex  - BMI 30 3  - discussed diet and encouraged exercise  - educated that it takes 3500 emily to lose 1lb  - advised to cut down on carbs, 5 servings of fruits/veggies/day, increase water intake (65-80oz/water/day)   - appropriate weight loss goal for women = 0 5-1lb/week  - per the Heart Association - 150mins/exercise/week, but to lose and maintain weight 200-300mins/exercise/week     Screening cholesterol level  -     Lipid panel;  Future  - The 10-year ASCVD risk score (Rosalie Gregory , et al , 2013) is: 6 5%    Values used to calculate the score:      Age: 61 years      Sex: Female      Is Non- : No      Diabetic: No      Tobacco smoker: No      Systolic Blood Pressure: 136 mmHg      Is BP treated: Yes      HDL Cholesterol: 80 mg/dL      Total Cholesterol: 211 mg/dL    Polyp of colon, unspecified part of colon, unspecified type  -     Ambulatory Referral to Gastroenterology; Future  - last Cscope was in 2017 with 5yr recall 2/2 h/o colon polyps - referral given for GI     Encounter for screening mammogram for malignant neoplasm of breast  -     Mammo screening bilateral w 3d & cad; Future  Cervical cancer screening  -     Ambulatory Referral to Obstetrics / Gynecology; Future  - (+) FHx of Ovarian CA in MGM   - (+) FHx of Breast Ca in PGM     Screening for deficiency anemia  -     CBC and differential; Future    Other orders  -     co-enzyme Q-10 30 MG capsule; Take 30 mg by mouth 3 (three) times a day          Subjective:    Patient ID: Brianne Orourke is a 61 y o  female    Brianne Orourke is a 61 y o  female who presents to the office with her  for an annual exam  - PMHx: GERD, MILEY on CPAP, AVM, meningioma, melanoma in situ of back, adeno of bladder, memory difficulties, h/o COVID (12/2021)   - allergies: NKDA  - Meds: see med rec   - PSHx: craniotomy, adenoma removed from liver, TURBT, tubal ligation   - FHx: F (brain ca), M (lung ca), Sister (melanoma), Brother (lung ca), Brother (prostate ca), MGM (ovarian ca), PGM (breast ca)  - Immunizations: Tdap, UTD with COVID IMMs and Booster  - GYN Hx: [facility], last OV was 8/2018  - Hm: Cscope in 2017 - 5yr f/u (2022) - due, Mammo 9/2018   - diet/exercise: does not exercise, diet: eggs daily, salad  - social: former smoker (quit in 1999, 2-3PPD/30yrs), social EtOH   - sexual Hx: interested in STD screening   - last vision: wears glasses, Dr Carmen Collazo, goes annually   - last dental: >1yr, Dr Deja Lynn   - ROS: today in the office pt denies F/C/N/V/HA/visual changes/CP/palpitations/SOB/wheezing/cough/abd pain/D/LE edema       The following portions of the patient's history were reviewed and updated as appropriate: allergies, current medications, past family history, past medical history, past social history, past surgical history and problem list     Review of Systems  as per HPI    Objective:  /70 (BP Location: Left arm, Patient Position: Sitting, Cuff Size: Large)   Pulse 83   Resp 16   Ht 5' (1 524 m)   Wt 70 3 kg (155 lb)   SpO2 99%   BMI 30 27 kg/m²    Physical Exam  Vitals reviewed  Constitutional:       General: She is not in acute distress  Appearance: Normal appearance  She is not ill-appearing, toxic-appearing or diaphoretic  HENT:      Head: Normocephalic and atraumatic  Right Ear: External ear normal       Left Ear: External ear normal    Eyes:      General: No scleral icterus  Right eye: No discharge  Left eye: No discharge  Extraocular Movements: Extraocular movements intact  Conjunctiva/sclera: Conjunctivae normal    Cardiovascular:      Rate and Rhythm: Normal rate and regular rhythm  Heart sounds: Normal heart sounds  No murmur heard  No friction rub  No gallop  Pulmonary:      Effort: Pulmonary effort is normal  No respiratory distress  Breath sounds: Normal breath sounds  No stridor  No wheezing, rhonchi or rales  Abdominal:      General: Bowel sounds are normal       Palpations: Abdomen is soft  Musculoskeletal:         General: Normal range of motion  Cervical back: Normal range of motion and neck supple  Right lower leg: No edema  Left lower leg: No edema  Skin:     General: Skin is warm  Neurological:      General: No focal deficit present  Mental Status: She is alert and oriented to person, place, and time  Psychiatric:         Mood and Affect: Mood normal          Behavior: Behavior normal        BMI Counseling: Body mass index is 30 27 kg/m²  The BMI is above normal  Nutrition recommendations include decreasing overall calorie intake   Exercise recommendations include exercising 3-5 times per week      Depression Screening Follow-up Plan: Patient's depression screening was positive with a PHQ-2 score of   Their PHQ-9 score was 0  Clinically patient does not have depression  No treatment is required

## 2022-04-02 LAB
C TRACH DNA SPEC QL NAA+PROBE: NEGATIVE
N GONORRHOEA DNA SPEC QL NAA+PROBE: NEGATIVE

## 2022-04-03 LAB
HIV 1+2 AB+HIV1 P24 AG SERPL QL IA: NORMAL
RPR SER QL: NORMAL

## 2022-04-27 ENCOUNTER — OFFICE VISIT (OUTPATIENT)
Dept: OBGYN CLINIC | Facility: CLINIC | Age: 67
End: 2022-04-27
Payer: COMMERCIAL

## 2022-04-27 VITALS
HEIGHT: 60 IN | WEIGHT: 157 LBS | DIASTOLIC BLOOD PRESSURE: 72 MMHG | SYSTOLIC BLOOD PRESSURE: 124 MMHG | BODY MASS INDEX: 30.82 KG/M2

## 2022-04-27 DIAGNOSIS — Z01.419 WELL WOMAN EXAM WITH ROUTINE GYNECOLOGICAL EXAM: Primary | ICD-10-CM

## 2022-04-27 DIAGNOSIS — M81.0 OSTEOPOROSIS, UNSPECIFIED OSTEOPOROSIS TYPE, UNSPECIFIED PATHOLOGICAL FRACTURE PRESENCE: ICD-10-CM

## 2022-04-27 DIAGNOSIS — Z12.4 CERVICAL CANCER SCREENING: ICD-10-CM

## 2022-04-27 DIAGNOSIS — Z12.4 SCREENING FOR MALIGNANT NEOPLASM OF CERVIX: ICD-10-CM

## 2022-04-27 DIAGNOSIS — Z11.51 SCREENING FOR HUMAN PAPILLOMAVIRUS (HPV): ICD-10-CM

## 2022-04-27 DIAGNOSIS — Z12.31 ENCOUNTER FOR SCREENING MAMMOGRAM FOR MALIGNANT NEOPLASM OF BREAST: ICD-10-CM

## 2022-04-27 DIAGNOSIS — N95.2 POSTMENOPAUSAL ATROPHIC VAGINITIS: ICD-10-CM

## 2022-04-27 PROCEDURE — G0145 SCR C/V CYTO,THINLAYER,RESCR: HCPCS | Performed by: OBSTETRICS & GYNECOLOGY

## 2022-04-27 PROCEDURE — 99386 PREV VISIT NEW AGE 40-64: CPT | Performed by: OBSTETRICS & GYNECOLOGY

## 2022-04-27 PROCEDURE — G0476 HPV COMBO ASSAY CA SCREEN: HCPCS | Performed by: OBSTETRICS & GYNECOLOGY

## 2022-04-27 RX ORDER — ESTRADIOL 0.1 MG/G
0.5 CREAM VAGINAL 3 TIMES WEEKLY
Qty: 42.5 G | Refills: 10 | Status: SHIPPED | OUTPATIENT
Start: 2022-04-27

## 2022-04-27 NOTE — PROGRESS NOTES
ASSESSMENT & PLAN:   Diagnoses and all orders for this visit:    Well woman exam with routine gynecological exam  -     Liquid-based pap, screening    Screening for malignant neoplasm of cervix  -     Liquid-based pap, screening    Screening for human papillomavirus (HPV)  -     Liquid-based pap, screening    Encounter for screening mammogram for malignant neoplasm of breast  -     Mammo screening bilateral w 3d & cad; Future    Cervical cancer screening  -     Ambulatory Referral to Obstetrics / Gynecology    Postmenopausal atrophic vaginitis  -     estradiol (ESTRACE VAGINAL) 0 1 mg/g vaginal cream; Insert 0 5 g into the vagina 3 (three) times a week    Osteoporosis, unspecified osteoporosis type, unspecified pathological fracture presence  -     DXA bone density spine hip and pelvis; Future      The following were reviewed in today's visit: ASCCP guidelines, Gardisil vaccination, STD testing breast self exam, mammography screening ordered, use and side effects of HRT, menopause, exercise, healthy diet and DEXA ordered  Patient to return to office in yearly for annual exam      All questions have been answered to her satisfaction  CC:  Annual Gynecologic Examination  Chief Complaint   Patient presents with    Gynecologic Exam     Pt is here for an annual exam and pap smear  Last pap was 8/20/2018 neg/neg HPV  Last mammo was 9/17/2018 normal  Her colonoscopy 3/20/17 - repeat 5 years  DEXA Scan was 9/17/2018  HPI: Jermain Cox is a 61 y o  [id number] who presents for annual gynecologic examination    She has the following concerns:  none      Health Maintenance:    Exercise: walks a lot  Breast exams/breast awareness: no  Diet: balanced  Last mammogram: 9/2018  Colorectal cancer screening: 3/2017, 5 year f/u       Past Medical History:   Diagnosis Date    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[address] )     CPAP (continuous positive airway pressure) dependence     GERD (gastroesophageal reflux disease)     History of arteriovenous malformation     Liver problem     Sleep apnea        Past Surgical History:   Procedure Laterality Date    BRAIN SURGERY      COLONOSCOPY      CRANIOTOMY      LIVER SURGERY      adenoma removed from liver    TN COLONOSCOPY FLX DX W/COLLJ SPEC WHEN PFRMD N/A 3/20/2017    Procedure: EGD AND COLONOSCOPY;  Surgeon: Neville David MD;  Location: [facility]; Service: Gastroenterology    TN CYSTOURETHROSCOPY,FULGUR <0 5 CM LESN N/A 12/4/2019    Procedure: TRANSURETHRAL RESECTION OF BLADDER TUMOR (TURBT); Surgeon: Matias Goyal MD;  Location[facility];  Service: Urology    TUBAL LIGATION         Past OB/Gyn History:   No LMP recorded  Patient is postmenopausal     Menopausal status: postmenopausal  Menopausal symptoms: dryness with intercourse, uses coconut oil  Discussed vaginal estrogen cream, risk/benefits discussed  Patient would like RX  Last Pap: 2018 : NILM, + other HR HPV  History of abnormal Pap smear: yes - HPV in past      Patient is currently sexually active     STD testing: no  Current contraception: post menopausal status      Family History  Family History   Problem Relation Age of Onset    Brain cancer Father     Lung cancer Mother     Melanoma Sister     Lung cancer Brother     Ovarian cancer Maternal Grandmother     Breast cancer Paternal Grandmother     Prostate cancer Brother     Diabetes Son     Obesity Daughter     Obesity Son     Asthma Son        Family history of uterine or ovarian cancer: yes  Family history of breast cancer: yes  Family history of colon cancer: no    Social History:  Social History     Socioeconomic History    Marital status: /Civil Union     Spouse name: Not on file    Number of children: Not on file    Years of education: Not on file    Highest education level: Not on file   Occupational History    Not on file   Tobacco Use    Smoking status: Former Smoker     Packs/day: 3 00     Years: 20 00     Pack years: 60 00 Quit date: 38     Years since quittin 3    Smokeless tobacco: Never Used   Vaping Use    Vaping Use: Never used   Substance and Sexual Activity    Alcohol use: Yes     Comment: occasional    Drug use: No    Sexual activity: Yes     Partners: Male     Birth control/protection: Post-menopausal, Female Sterilization   Other Topics Concern    Not on file   Social History Narrative    Not on file     Social Determinants of Health     Financial Resource Strain: Not on file   Food Insecurity: Not on file   Transportation Needs: Not on file   Physical Activity: Not on file   Stress: Not on file   Social Connections: Not on file   Intimate Partner Violence: Not on file   Housing Stability: Not on file     Domestic violence screen: negative    Allergies:  No Known Allergies    Medications:    Current Outpatient Medications:     acetaminophen (TYLENOL) 500 mg tablet, Take 1 tablet by mouth, Disp: , Rfl:     Ascorbic Acid (VITAMIN C GUMMIES PO), Take by mouth 2 gummies a day, Disp: , Rfl:     B Complex-C CAPS, Take 1 capsule by mouth daily, Disp: , Rfl:     CALCIUM-MAGNESIUM-ZINC PO, Take by mouth daily, Disp: , Rfl:     co-enzyme Q-10 30 MG capsule, Take 30 mg by mouth 3 (three) times a day, Disp: , Rfl:     Collagen 500 MG CAPS, Take by mouth, Disp: , Rfl:     LORATADINE ALLERGY RELIEF PO, Take by mouth, Disp: , Rfl:     Mirabegron ER 50 MG TB24, Take 1 tablet (50 mg total) by mouth daily, Disp: 90 tablet, Rfl: 3    estradiol (ESTRACE VAGINAL) 0 1 mg/g vaginal cream, Insert 0 5 g into the vagina 3 (three) times a week, Disp: 42 5 g, Rfl: 10    hydrochlorothiazide (HYDRODIURIL) 25 mg tablet, Take 1 tablet (25 mg total) by mouth daily (Patient not taking: Reported on 2021), Disp: 30 tablet, Rfl: 0    Review of Systems:  Review of Systems   Constitutional: Negative for chills and fever  Respiratory: Negative for cough and shortness of breath      Cardiovascular: Negative for chest pain and palpitations  Gastrointestinal: Positive for constipation (occasional)  Negative for abdominal distention, abdominal pain, blood in stool, nausea and vomiting  Genitourinary: Positive for dyspareunia and vaginal pain  Negative for pelvic pain, vaginal bleeding and vaginal discharge  Neurological: Negative for headaches  Physical Exam:  /72 (BP Location: Left arm, Patient Position: Sitting, Cuff Size: Large)   Ht 5' (1 524 m)   Wt 71 2 kg (157 lb)   BMI 30 66 kg/m²    Physical Exam  Constitutional:       General: She is awake  Appearance: Normal appearance  She is well-developed  Genitourinary:      Vulva and bladder normal       Right Labia: No rash, tenderness or lesions  Left Labia: No tenderness, lesions or rash  No vaginal discharge, erythema, tenderness or bleeding  No vaginal prolapse present  Moderate vaginal atrophy present  Right Adnexa: not tender, not full and no mass present  Left Adnexa: not tender, not full and no mass present  No cervical motion tenderness, discharge, lesion or polyp  Uterus is not enlarged, tender or irregular  No uterine mass detected  No urethral prolapse present  Bladder is not tender  Pelvic exam was performed with patient in the lithotomy position  Breasts:      Right: No inverted nipple, mass, nipple discharge, skin change, tenderness, axillary adenopathy or supraclavicular adenopathy  Left: No inverted nipple, mass, nipple discharge, skin change, tenderness, axillary adenopathy or supraclavicular adenopathy  HENT:      Head: Normocephalic and atraumatic  Cardiovascular:      Rate and Rhythm: Normal rate and regular rhythm  Heart sounds: Normal heart sounds  Pulmonary:      Effort: Pulmonary effort is normal  No tachypnea or respiratory distress  Breath sounds: Normal breath sounds  Abdominal:      General: Abdomen is flat  There is no distension        Palpations: Abdomen is soft  Tenderness: There is no abdominal tenderness  There is no guarding or rebound  Musculoskeletal:      Cervical back: Neck supple  Lymphadenopathy:      Upper Body:      Right upper body: No supraclavicular or axillary adenopathy  Left upper body: No supraclavicular or axillary adenopathy  Neurological:      General: No focal deficit present  Mental Status: She is alert and oriented to person, place, and time  Psychiatric:         Mood and Affect: Mood normal          Behavior: Behavior normal          Thought Content: Thought content normal          Judgment: Judgment normal    Vitals reviewed

## 2022-04-29 ENCOUNTER — OFFICE VISIT (OUTPATIENT)
Dept: FAMILY MEDICINE CLINIC | Facility: CLINIC | Age: 67
End: 2022-04-29
Payer: COMMERCIAL

## 2022-04-29 VITALS
HEIGHT: 60 IN | WEIGHT: 156 LBS | HEART RATE: 64 BPM | OXYGEN SATURATION: 99 % | BODY MASS INDEX: 30.63 KG/M2 | RESPIRATION RATE: 16 BRPM | SYSTOLIC BLOOD PRESSURE: 128 MMHG | DIASTOLIC BLOOD PRESSURE: 68 MMHG

## 2022-04-29 DIAGNOSIS — I10 ESSENTIAL HYPERTENSION: Primary | ICD-10-CM

## 2022-04-29 DIAGNOSIS — D42.0 ATYPICAL INTRACRANIAL MENINGIOMA (HCC): ICD-10-CM

## 2022-04-29 DIAGNOSIS — K63.5 POLYP OF COLON, UNSPECIFIED PART OF COLON, UNSPECIFIED TYPE: ICD-10-CM

## 2022-04-29 DIAGNOSIS — Z87.891 FORMER SMOKER: ICD-10-CM

## 2022-04-29 LAB
HPV HR 12 DNA CVX QL NAA+PROBE: NEGATIVE
HPV16 DNA CVX QL NAA+PROBE: NEGATIVE
HPV18 DNA CVX QL NAA+PROBE: NEGATIVE

## 2022-04-29 PROCEDURE — 1036F TOBACCO NON-USER: CPT | Performed by: FAMILY MEDICINE

## 2022-04-29 PROCEDURE — 3008F BODY MASS INDEX DOCD: CPT | Performed by: FAMILY MEDICINE

## 2022-04-29 PROCEDURE — 99213 OFFICE O/P EST LOW 20 MIN: CPT | Performed by: FAMILY MEDICINE

## 2022-04-29 RX ORDER — ZINC GLUCONATE 50 MG
50 TABLET ORAL DAILY
COMMUNITY

## 2022-04-29 NOTE — PROGRESS NOTES
Assessment/Plan:   Diagnoses and all orders for this visit:    Essential hypertension  - BP in the office on my repeat 128/68  - nml renal function and urine microalbumin on labs done 4/1/2022  - does not currently need to be on antihypertensives   - RTO in 1yr/PRN - pt and  aware and agreeable   Former smoker    BMI 30 0-30 9,adult  - BMI 30 5  - discussed diet and encouraged exercise  - educated that it takes 3500 emily to lose 1lb  - advised to cut down on carbs, 5 servings of fruits/veggies/day, increase water intake (65-80oz/water/day)   - appropriate weight loss goal for women = 0 5-1lb/week  - per the Heart Association - 150mins/exercise/week, but to lose and maintain weight 200-300mins/exercise/week     Polyp of colon, unspecified part of colon, unspecified type  - has an appt scheduled with GI on 5/10/2022 given h/o polyps and need for repeat Cscope this year     Atypical intracranial meningioma ([facility] [address] )  - s/p craniotomy  - follows Q5yr with Neurosurg     Other orders  -     zinc gluconate 50 mg tablet; Take 50 mg by mouth daily          Subjective:    Patient ID: Rich Cherry is a 61 y o  female    HPI  58yo F presents to the office with her  for BP f/u   - BP in the office on my repeat 128/68  - reviewed labs done 4/1/2022   - UTD with annual GYN exam and PAP   - has script for Mammo   - has an appt scheduled with GI given h/o polyps and need for repeat Cscope this year   - denies F/C/N/V/CP/palpitations/SOB/wheezing/abd pain/LE edema       The following portions of the patient's history were reviewed and updated as appropriate: allergies, current medications, past family history, past medical history, past social history, past surgical history and problem list     Review of Systems  as per HPI    Objective:  /68 (BP Location: Right arm, Patient Position: Sitting, Cuff Size: Standard)   Pulse 64   Resp 16   Ht 5' (1 524 m)   Wt 70 8 kg (156 lb)   SpO2 99%   BMI 30 47 kg/m² Physical Exam  Vitals reviewed  Constitutional:       General: She is not in acute distress  Appearance: Normal appearance  She is not ill-appearing, toxic-appearing or diaphoretic  HENT:      Head: Normocephalic and atraumatic  Right Ear: External ear normal       Left Ear: External ear normal       Nose: Nose normal    Eyes:      General: No scleral icterus  Right eye: No discharge  Left eye: No discharge  Extraocular Movements: Extraocular movements intact  Conjunctiva/sclera: Conjunctivae normal    Cardiovascular:      Rate and Rhythm: Normal rate and regular rhythm  Heart sounds: Normal heart sounds  No murmur heard  No friction rub  No gallop  Pulmonary:      Effort: Pulmonary effort is normal  No respiratory distress  Breath sounds: Normal breath sounds  No stridor  No wheezing, rhonchi or rales  Abdominal:      Palpations: Abdomen is soft  Musculoskeletal:         General: Normal range of motion  Cervical back: Normal range of motion  Right lower leg: No edema  Left lower leg: No edema  Skin:     General: Skin is warm  Neurological:      General: No focal deficit present  Mental Status: She is alert and oriented to person, place, and time  Psychiatric:         Mood and Affect: Mood normal          Behavior: Behavior normal          BMI Counseling: Body mass index is 30 47 kg/m²  The BMI is above normal  Nutrition recommendations include 3-5 servings of fruits/vegetables daily  Exercise recommendations include exercising 3-5 times per week

## 2022-04-29 NOTE — RESULT ENCOUNTER NOTE
Fortino Caraballo,     Your HPV testing is negative  Your pap is still pending, and will be updated soon  Please contact the office at [phone] with any questions       Lana

## 2022-05-03 LAB
LAB AP GYN PRIMARY INTERPRETATION: NORMAL
Lab: NORMAL

## 2022-05-05 ENCOUNTER — HOSPITAL ENCOUNTER (OUTPATIENT)
Dept: RADIOLOGY | Facility: HOSPITAL | Age: 67
Discharge: HOME | End: 2022-05-05
Attending: OBSTETRICS & GYNECOLOGY
Payer: COMMERCIAL

## 2022-05-05 VITALS — WEIGHT: 156 LBS | HEIGHT: 60 IN | BODY MASS INDEX: 30.63 KG/M2

## 2022-05-05 DIAGNOSIS — Z12.31 ENCOUNTER FOR SCREENING MAMMOGRAM FOR MALIGNANT NEOPLASM OF BREAST: ICD-10-CM

## 2022-05-05 PROCEDURE — 77063 BREAST TOMOSYNTHESIS BI: CPT

## 2022-05-05 PROCEDURE — 77067 SCR MAMMO BI INCL CAD: CPT

## 2022-05-10 ENCOUNTER — TELEPHONE (OUTPATIENT)
Dept: GASTROENTEROLOGY | Facility: CLINIC | Age: 67
End: 2022-05-10

## 2022-05-10 ENCOUNTER — CONSULT (OUTPATIENT)
Dept: GASTROENTEROLOGY | Facility: CLINIC | Age: 67
End: 2022-05-10
Payer: COMMERCIAL

## 2022-05-10 VITALS
SYSTOLIC BLOOD PRESSURE: 136 MMHG | HEART RATE: 93 BPM | HEIGHT: 60 IN | BODY MASS INDEX: 31.02 KG/M2 | DIASTOLIC BLOOD PRESSURE: 69 MMHG | WEIGHT: 158 LBS

## 2022-05-10 DIAGNOSIS — Z85.51 HISTORY OF BLADDER CANCER: ICD-10-CM

## 2022-05-10 DIAGNOSIS — K57.90 DIVERTICULOSIS: ICD-10-CM

## 2022-05-10 DIAGNOSIS — K21.00 GASTROESOPHAGEAL REFLUX DISEASE WITH ESOPHAGITIS WITHOUT HEMORRHAGE: Primary | ICD-10-CM

## 2022-05-10 DIAGNOSIS — R74.8 ELEVATED ALKALINE PHOSPHATASE LEVEL: ICD-10-CM

## 2022-05-10 DIAGNOSIS — K63.5 POLYP OF COLON, UNSPECIFIED PART OF COLON, UNSPECIFIED TYPE: ICD-10-CM

## 2022-05-10 PROCEDURE — 1036F TOBACCO NON-USER: CPT | Performed by: INTERNAL MEDICINE

## 2022-05-10 PROCEDURE — 99204 OFFICE O/P NEW MOD 45 MIN: CPT | Performed by: INTERNAL MEDICINE

## 2022-05-10 NOTE — TELEPHONE ENCOUNTER
[facility] Assessment    Name: Yonatan Cisneros  YOB: 1955  Last Height: 5' (1 524 m)  Last weight: 71 7 kg (158 lb)  BMI: 30 86 kg/m²  Procedure: FLIP  Diagnosis: SEE ORDERS  Date of procedure: 6/13/22  Prep: miralax and mag cit  Responsible : yes  Phone#: [phone]  Name completing form: Dustin Perdomo  Date form completed: 05/10/22    If the patient answers yes to any of these questions, schedule in a hospital  Are you pregnant: No  Do you rely on a wheelchair for mobility: No  Have you been diagnosed with End Stage Renal Disease (ESRD): No  Do you need oxygen during the day: No  Have you had a heart attack or stroke within the past three months: No  Have you had a seizure within the past three months: No  Have you ever been informed by anesthesia that you have a difficult airway: No  Additional Questions  Have you had any cardiac testing or are under the care of a Cardiologist (see cardiac list): No  Cardiac list:   Do you have an implanted cardiac defibrillator: No (Comment:  This patient should be scheduled in the hospital)    Have any bleeding problems, such as anemia or hemophilia (If patient has H&H result below 8, schedule in hospital   H&H must be within 30 days of procedure): No    Had an organ transplant within the past 3 months: No    Do you have any present infections: No  Do you get short of breath when walking a few blocks: No  Have you been diagnosed with diabetes: No  Comments (provide cardiac provider information if applicable):

## 2022-05-10 NOTE — PROGRESS NOTES
[facility] - Outpatient Consultation  DANIS OCONNOR 61 y o  female MRN: [id number]  Encounter: [id number]          ASSESSMENT AND PLAN:      1  Polyp of colon, unspecified part of colon, unspecified type  Patient is presented for a colonoscopy for evaluation of colon polyp  She has history of colon polyp that was removed five years ago in [facility]  Patient has constipation  She is not eating sufficient high-fiber intake  Long discussion regarding high-fiber diet  Patient also has history of diverticulosis  She denies any rectal bleeding or mucus per rectum  A colonoscopy will be scheduled  - Ambulatory Referral to Gastroenterology  - Colonoscopy; Future    2  Gastroesophageal reflux disease with esophagitis without hemorrhage  Patient does have history of chronic gastroesophageal reflux disease and esophagitis  She has had endoscopy documenting reflux and possible hiatal hernia  Currently she is taking over-the-counter H2 receptor antagonists  Usually she does not have any dysphagia or odynophagia  She was told to have hiatal hernia during previous endoscopy  - EGD; Future    3  Diverticulosis  History of diverticulosis  Long discussion regarding high-fiber diet  Patient will try to eat more fiber     - Colonoscopy; Future    4  History of bladder cancer  She has history of bladder cancer  She is ex-smoker  Currently she is doing well and following with Urology  There has been no recurrence noted  I have reviewed previous CT scan done  5  Elevated alkaline phosphatase level  She has history of elevated alkaline phosphatase  This is most likely from previous liver surgery  Patient had hepatic tumor resection many years ago  Patient probably had a fibro nodular hyperplasia operated on  There is a long and large scar on the top of the abdomen  Possible granuloma also on the liver      - Antimitochondrial antibody; Future    ______________________________________________________________________    HPI:  Patient is presented with history of gastroesophageal reflux disease and heartburn  There is no dysphagia or odynophagia  There is abdominal discomfort and bloating  Bowel habits have not changed  There is no rectal bleeding or mucus per rectum  She has history of colon polyp  REVIEW OF SYSTEMS:    CONSTITUTIONAL: Denies any fever, chills, rigors, and weight loss  HEENT: No earache or tinnitus  Denies hearing loss or visual disturbances  CARDIOVASCULAR: No chest pain or palpitations  RESPIRATORY: Denies any cough, hemoptysis, shortness of breath or dyspnea on exertion  GASTROINTESTINAL: As noted in the History of Present Illness  GENITOURINARY: No problems with urination  Denies any hematuria or dysuria  NEUROLOGIC: No dizziness or vertigo, denies headaches  MUSCULOSKELETAL: Denies any muscle or joint pain  SKIN: Denies skin rashes or itching  ENDOCRINE: Denies excessive thirst  Denies intolerance to heat or cold  PSYCHOSOCIAL: Denies depression or anxiety  Denies any recent memory loss  Historical Information   Past Medical History:   Diagnosis Date    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[address] )     CPAP (continuous positive airway pressure) dependence     GERD (gastroesophageal reflux disease)     History of arteriovenous malformation     Liver problem     Sleep apnea      Past Surgical History:   Procedure Laterality Date    BRAIN SURGERY      BREAST BIOPSY Right     COLONOSCOPY      CRANIOTOMY      LIVER SURGERY      adenoma removed from liver    VA COLONOSCOPY FLX DX W/COLLJ SPEC WHEN PFRMD N/A 3/20/2017    Procedure: EGD AND COLONOSCOPY;  Surgeon: Morelia Asif MD;  Location: [facility]; Service: Gastroenterology    VA CYSTOURETHROSCOPY,FULGUR <0 5 CM LESN N/A 12/4/2019    Procedure: TRANSURETHRAL RESECTION OF BLADDER TUMOR (TURBT);   Surgeon: Phylliss Halsted, MD;  [facility]; Service: Urology    TUBAL LIGATION       Social History   Social History     Substance and Sexual Activity   Alcohol Use Yes    Comment: occasional     Social History     Substance and Sexual Activity   Drug Use No     Social History     Tobacco Use   Smoking Status Former Smoker    Packs/day: 3 00    Years: 20 00    Pack years: 60 00    Quit date:     Years since quittin 3   Smokeless Tobacco Never Used     Family History   Problem Relation Age of Onset    Brain cancer Father     Lung cancer Mother     Melanoma Sister     Lung cancer Brother     Ovarian cancer Maternal Grandmother     Breast cancer Paternal Grandmother     Prostate cancer Brother     Diabetes Son     Obesity Daughter     Obesity Son     Asthma Son        Meds/Allergies       Current Outpatient Medications:     acetaminophen (TYLENOL) 500 mg tablet    Ascorbic Acid (VITAMIN C GUMMIES PO)    B Complex-C CAPS    CALCIUM-MAGNESIUM-ZINC PO    co-enzyme Q-10 30 MG capsule    Collagen 500 MG CAPS    estradiol (ESTRACE VAGINAL) 0 1 mg/g vaginal cream    LORATADINE ALLERGY RELIEF PO    Mirabegron ER 50 MG TB24    zinc gluconate 50 mg tablet    No Known Allergies        Objective     Blood pressure 136/69, pulse 93, height 5' (1 524 m), weight 71 7 kg (158 lb)  Body mass index is 30 86 kg/m²  PHYSICAL EXAM:      General Appearance:   Alert, cooperative, no distress   HEENT:   Normocephalic, atraumatic, anicteric      Neck:  Supple, symmetrical, trachea midline   Lungs:   Clear to auscultation bilaterally; no rales, rhonchi or wheezing; respirations unlabored    Heart[de-identified]   Regular rate and rhythm; no murmur, rub, or gallop     Abdomen:   Soft, non-tender, non-distended; normal bowel sounds; no masses, no organomegaly    Genitalia:   Deferred    Rectal:   Deferred    Extremities:  No cyanosis, clubbing or edema    Pulses:  2+ and symmetric    Skin:  No jaundice, rashes, or lesions    Lymph nodes:  No palpable cervical lymphadenopathy        Lab Results:   No visits with results within 1 Day(s) from this visit  Latest known visit with results is:   Office Visit on 04/27/2022   Component Date Value    Case Report 04/27/2022                      Value:Gynecologic Cytology Report                       Case: [id number]                                  Authorizing Provider:  Chasity Us DO   Collected:           04/27/2022 1053              Ordering Location:     [facility]  Received:            04/27/2022 [id number]                                                        First Screen:          Kirill Novoas, CT                                                       Specimen:    LIQUID-BASED PAP, SCREENING, Cervix, Endocervical                                          Primary Interpretation 04/27/2022 Negative for intraepithelial lesion or malignancy     Specimen Adequacy 04/27/2022 Satisfactory for evaluation  Endocervical/transformation zone component present   Additional Information 04/27/2022                      Value: This result contains rich text formatting which cannot be displayed here   HPV Other HR 04/27/2022 Negative     HPV16 04/27/2022 Negative     HPV18 04/27/2022 Negative          Radiology Results:   Mammo screening bilateral w 3d & cad    Result Date: 5/6/2022  Narrative: DIAGNOSIS: Encounter for screening mammogram for malignant neoplasm of breast TECHNIQUE: Digital screening mammography was performed  Computer Aided Detection (CAD) analyzed all applicable images  COMPARISONS: Prior breast imaging dated: 09/17/2018 and 10/24/2016 RELEVANT HISTORY: Family Breast Cancer History: History of breast cancer in Paternal Grandmother  Family Medical History: Family medical history includes breast cancer in paternal grandmother and ovarian cancer in maternal grandmother  Personal History: No known relevant hormone history   Surgical history includes breast biopsy  No known relevant medical history  The patient is scheduled in a reminder system for screening mammography  8-10% of cancers will be missed on mammography  Management of a palpable abnormality must be based on clinical grounds  Patients will be notified of their results via letter from our facility  Accredited by [employer] of Radiology and FDA  RISK ASSESSMENT: 5 Year Tyrer-Cuzick: 2 12 % 10 Year Tyrer-Cuzick: 3 89 % Lifetime Tyrer-Cuzick: 8 34 % TISSUE DENSITY: There are scattered areas of fibroglandular density  INDICATION: Aleyda Bustos is a 61 y o  female presenting for screening mammography  FINDINGS: There are no suspicious masses, grouped microcalcifications or areas of architectural distortion  The skin and nipple areolar complex are unremarkable  Impression: No mammographic evidence of malignancy  ASSESSMENT/BI-RADS CATEGORY: Left: 1 - Negative Right: 1 - Negative Overall: 1 - Negative RECOMMENDATION:      - Routine screening mammogram in 1 year for both breasts   Workstation ID: [id number]

## 2022-05-17 ENCOUNTER — APPOINTMENT (OUTPATIENT)
Dept: LAB | Facility: CLINIC | Age: 67
End: 2022-05-17
Payer: COMMERCIAL

## 2022-05-17 DIAGNOSIS — R74.8 ELEVATED ALKALINE PHOSPHATASE LEVEL: ICD-10-CM

## 2022-05-17 PROCEDURE — 36415 COLL VENOUS BLD VENIPUNCTURE: CPT

## 2022-05-17 PROCEDURE — 86381 MITOCHONDRIAL ANTIBODY EACH: CPT

## 2022-05-18 LAB — MITOCHONDRIA M2 IGG SER-ACNC: <20 UNITS (ref 0–20)

## 2022-05-23 ENCOUNTER — OFFICE VISIT (OUTPATIENT)
Dept: PULMONOLOGY | Facility: MEDICAL CENTER | Age: 67
End: 2022-05-23
Payer: COMMERCIAL

## 2022-05-23 VITALS
RESPIRATION RATE: 12 BRPM | SYSTOLIC BLOOD PRESSURE: 122 MMHG | HEART RATE: 66 BPM | TEMPERATURE: 98.7 F | WEIGHT: 157 LBS | DIASTOLIC BLOOD PRESSURE: 70 MMHG | HEIGHT: 60 IN | BODY MASS INDEX: 30.82 KG/M2 | OXYGEN SATURATION: 97 %

## 2022-05-23 DIAGNOSIS — G47.33 OBSTRUCTIVE SLEEP APNEA: Primary | ICD-10-CM

## 2022-05-23 PROCEDURE — 3008F BODY MASS INDEX DOCD: CPT | Performed by: INTERNAL MEDICINE

## 2022-05-23 PROCEDURE — 99213 OFFICE O/P EST LOW 20 MIN: CPT | Performed by: INTERNAL MEDICINE

## 2022-05-23 NOTE — PROGRESS NOTES
Assessment/Plan        Problem List Items Addressed This Visit        Respiratory    Obstructive sleep apnea - Primary     Mild MILEY with good compliance to CPAP therapy  I reviewed compliance data from past 1 month  She did receive a new auto dream Station to CPAP machine from [facility] as her previous one was on recall  She used her CPAP machine for average of 6 hours per night this resulted in AHI of 2 0 which is good  She is on auto CPAP set at 5-20 cm water and her average CPAP pressure was 8 5 cm water  Continue CPAP at same setting  She was interested in possibly switching to a full face mask  Her DME is [employer]              Other    BMI 30 0-30 9,adult     She is overweight  I did encourage her to watch her diet and try to lose weight                   Cc:  Follow up of MILEY      HPI     Akira Morocho has mild MILEY  She had diagnostic sleep study done December 2018 which showed overall AHI of 7 1 and this increased to 23 during REM sleep  Her oxygen olivia was 86%  She weighed 148 lb at that time  She does use auto CPAP which is set at 4-20 cm water and last visit I recommended Resmed AirFit F30 face mask  She did not care for the AirFit F30 mask and 1 back to using DreamWear nasal mask which she likes  This just goes underneath her nose  She did get a new auto dream Station to CPAP machine from [facility] within last several months  Her previous machine was on recall list   Her DME company is [facility]     She does get some intermittent nasal congestion will take Claritin which helps  No difficulty breathing  No other new problems since I saw  She does have history of melanoma which was resected from her back  She does see dermatologist on regular basis            Past Medical History:   Diagnosis Date    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[address] )     CPAP (continuous positive airway pressure) dependence     GERD (gastroesophageal reflux disease)     History of arteriovenous malformation     Liver problem     Sleep apnea        Past Surgical History:   Procedure Laterality Date    BRAIN SURGERY      BREAST BIOPSY Right     COLONOSCOPY      CRANIOTOMY      LIVER SURGERY      adenoma removed from liver    AL COLONOSCOPY FLX DX W/COLLJ SPEC WHEN PFRMD N/A 3/20/2017    Procedure: EGD AND COLONOSCOPY;  Surgeon: Tammi Haines MD;  Location: [facility]; Service: Gastroenterology    AL CYSTOURETHROSCOPY,FULGUR <0 5 CM LESN N/A 2019    Procedure: TRANSURETHRAL RESECTION OF BLADDER TUMOR (TURBT);   Surgeon: Cesar Benavidez MD;  [facility];  Service: Urology    TUBAL LIGATION           Current Outpatient Medications:     acetaminophen (TYLENOL) 500 mg tablet, Take 1 tablet by mouth, Disp: , Rfl:     Ascorbic Acid (VITAMIN C GUMMIES PO), Take by mouth 2 gummies a day, Disp: , Rfl:     B Complex-C CAPS, Take 1 capsule by mouth daily, Disp: , Rfl:     CALCIUM-MAGNESIUM-ZINC PO, Take by mouth daily, Disp: , Rfl:     co-enzyme Q-10 30 MG capsule, Take 30 mg by mouth 3 (three) times a day, Disp: , Rfl:     Collagen 500 MG CAPS, Take by mouth, Disp: , Rfl:     estradiol (ESTRACE VAGINAL) 0 1 mg/g vaginal cream, Insert 0 5 g into the vagina 3 (three) times a week, Disp: 42 5 g, Rfl: 10    LORATADINE ALLERGY RELIEF PO, Take by mouth, Disp: , Rfl:     Mirabegron ER 50 MG TB24, Take 1 tablet (50 mg total) by mouth daily, Disp: 90 tablet, Rfl: 3    zinc gluconate 50 mg tablet, Take 50 mg by mouth daily, Disp: , Rfl:     No Known Allergies    Social History     Tobacco Use    Smoking status: Former Smoker     Packs/day: 3 00     Years: 20 00     Pack years: 60 00     Quit date:      Years since quittin 4    Smokeless tobacco: Never Used   Substance Use Topics    Alcohol use: Yes     Comment: occasional         Family History   Problem Relation Age of Onset    Brain cancer Father     Lung cancer Mother     Melanoma Sister     Lung cancer Brother  Ovarian cancer Maternal Grandmother     Breast cancer Paternal Grandmother     Prostate cancer Brother     Diabetes Son     Obesity Daughter     Obesity Son     Asthma Son        Review of Systems   Constitutional: Negative for chills, fever and unexpected weight change  HENT: Positive for postnasal drip  Negative for congestion, rhinorrhea and sore throat  Eyes: Negative for discharge and redness  Cardiovascular: Negative for chest pain, palpitations and leg swelling  Gastrointestinal: Negative for abdominal distention, abdominal pain and nausea  Endocrine: Negative for polydipsia and polyphagia  Genitourinary: Negative for dysuria  Musculoskeletal: Negative for joint swelling and myalgias  Skin: Negative for rash  Neurological: Negative for light-headedness  Psychiatric/Behavioral: Negative for confusion  Height 5 ft weight 157 lb BMI 30 66    Vitals:    05/23/22 1133   BP: 122/70   Pulse: 66   Resp: 12   Temp: 98 7 °F (37 1 °C)   SpO2: 97%           Physical Exam  Vitals reviewed  Constitutional:       General: She is not in acute distress  Appearance: Normal appearance  She is well-developed  HENT:      Head: Normocephalic  Right Ear: External ear normal       Left Ear: External ear normal       Nose: Nose normal       Mouth/Throat:      Mouth: Mucous membranes are moist       Pharynx: No oropharyngeal exudate  Comments: Mallampati score 3  Eyes:      Conjunctiva/sclera: Conjunctivae normal       Pupils: Pupils are equal, round, and reactive to light  Cardiovascular:      Rate and Rhythm: Normal rate and regular rhythm  Heart sounds: Normal heart sounds  Pulmonary:      Effort: Pulmonary effort is normal    Abdominal:      General: There is no distension  Palpations: Abdomen is soft  Tenderness: There is no abdominal tenderness  Musculoskeletal:      Cervical back: Neck supple        Comments: No edema, cyanosis, or clubbing Lymphadenopathy:      Cervical: No cervical adenopathy  Skin:     General: Skin is warm and dry  Neurological:      Mental Status: She is alert and oriented to person, place, and time  Psychiatric:         Mood and Affect: Mood normal          Behavior: Behavior normal          Thought Content:  Thought content normal

## 2022-05-30 NOTE — ASSESSMENT & PLAN NOTE
Mild MILEY with good compliance to CPAP therapy  I reviewed compliance data from past 1 month  She did receive a new auto dream Station to CPAP machine from [facility] as her previous one was on recall  She used her CPAP machine for average of 6 hours per night this resulted in AHI of 2 0 which is good  She is on auto CPAP set at 5-20 cm water and her average CPAP pressure was 8 5 cm water  Continue CPAP at same setting  She was interested in possibly switching to a full face mask      Her DME is [employer] [facility]

## 2022-06-03 ENCOUNTER — TELEPHONE (OUTPATIENT)
Dept: GASTROENTEROLOGY | Facility: CLINIC | Age: 67
End: 2022-06-03

## 2022-06-03 NOTE — TELEPHONE ENCOUNTER
Spoke to pt confirming her colonoscopy/EGD scheduled on 6/13/22 with Dr Nitza Villa at [facility]  I informed pt that [facility] would be calling 1-2 days prior with arrival time  I informed of clear liquid diet the day before and to do the bowel cleansing preparation  I informed pt that she will need a  the day of the procedure due to being under sedation  Answered pt's questions regarding prep and address

## 2022-06-13 ENCOUNTER — HOSPITAL ENCOUNTER (OUTPATIENT)
Dept: GASTROENTEROLOGY | Facility: AMBULATORY SURGERY CENTER | Age: 67
Discharge: HOME | End: 2022-06-13
Payer: COMMERCIAL

## 2022-06-13 ENCOUNTER — ANESTHESIA (OUTPATIENT)
Dept: GASTROENTEROLOGY | Facility: AMBULATORY SURGERY CENTER | Age: 67
End: 2022-06-13

## 2022-06-13 ENCOUNTER — ANESTHESIA EVENT (OUTPATIENT)
Dept: ANESTHESIOLOGY | Facility: AMBULATORY SURGERY CENTER | Age: 67
End: 2022-06-13

## 2022-06-13 ENCOUNTER — ANESTHESIA EVENT (OUTPATIENT)
Dept: GASTROENTEROLOGY | Facility: AMBULATORY SURGERY CENTER | Age: 67
End: 2022-06-13

## 2022-06-13 ENCOUNTER — ANESTHESIA (OUTPATIENT)
Dept: ANESTHESIOLOGY | Facility: AMBULATORY SURGERY CENTER | Age: 67
End: 2022-06-13

## 2022-06-13 VITALS
OXYGEN SATURATION: 100 % | DIASTOLIC BLOOD PRESSURE: 61 MMHG | WEIGHT: 143 LBS | RESPIRATION RATE: 18 BRPM | HEIGHT: 59 IN | TEMPERATURE: 96.3 F | SYSTOLIC BLOOD PRESSURE: 118 MMHG | HEART RATE: 61 BPM | BODY MASS INDEX: 28.83 KG/M2

## 2022-06-13 DIAGNOSIS — K21.00 GASTROESOPHAGEAL REFLUX DISEASE WITH ESOPHAGITIS WITHOUT HEMORRHAGE: ICD-10-CM

## 2022-06-13 DIAGNOSIS — K63.5 POLYP OF COLON, UNSPECIFIED PART OF COLON, UNSPECIFIED TYPE: ICD-10-CM

## 2022-06-13 DIAGNOSIS — K57.90 DIVERTICULOSIS: ICD-10-CM

## 2022-06-13 PROBLEM — D33.2 BRAIN TUMOR (BENIGN) (HCC): Status: ACTIVE | Noted: 2022-06-13

## 2022-06-13 PROCEDURE — 88305 TISSUE EXAM BY PATHOLOGIST: CPT | Performed by: PATHOLOGY

## 2022-06-13 PROCEDURE — 43239 EGD BIOPSY SINGLE/MULTIPLE: CPT | Performed by: INTERNAL MEDICINE

## 2022-06-13 PROCEDURE — 45380 COLONOSCOPY AND BIOPSY: CPT | Performed by: INTERNAL MEDICINE

## 2022-06-13 RX ORDER — UREA 10 %
500 LOTION (ML) TOPICAL ONCE
COMMUNITY

## 2022-06-13 RX ORDER — PROPOFOL 10 MG/ML
INJECTION, EMULSION INTRAVENOUS AS NEEDED
Status: DISCONTINUED | OUTPATIENT
Start: 2022-06-13 | End: 2022-06-13

## 2022-06-13 RX ORDER — SODIUM CHLORIDE 9 MG/ML
INJECTION, SOLUTION INTRAVENOUS CONTINUOUS PRN
Status: DISCONTINUED | OUTPATIENT
Start: 2022-06-13 | End: 2022-06-13

## 2022-06-13 RX ORDER — SODIUM CHLORIDE 9 MG/ML
20 INJECTION, SOLUTION INTRAVENOUS CONTINUOUS
Status: DISCONTINUED | OUTPATIENT
Start: 2022-06-13 | End: 2022-06-17 | Stop reason: HOSPADM

## 2022-06-13 RX ADMIN — PROPOFOL 30 MG: 10 INJECTION, EMULSION INTRAVENOUS at 11:51

## 2022-06-13 RX ADMIN — PROPOFOL 30 MG: 10 INJECTION, EMULSION INTRAVENOUS at 11:48

## 2022-06-13 RX ADMIN — PROPOFOL 120 MG: 10 INJECTION, EMULSION INTRAVENOUS at 11:39

## 2022-06-13 RX ADMIN — PROPOFOL 30 MG: 10 INJECTION, EMULSION INTRAVENOUS at 11:54

## 2022-06-13 RX ADMIN — PROPOFOL 30 MG: 10 INJECTION, EMULSION INTRAVENOUS at 12:03

## 2022-06-13 RX ADMIN — PROPOFOL 30 MG: 10 INJECTION, EMULSION INTRAVENOUS at 11:45

## 2022-06-13 RX ADMIN — PROPOFOL 30 MG: 10 INJECTION, EMULSION INTRAVENOUS at 11:42

## 2022-06-13 RX ADMIN — PROPOFOL 30 MG: 10 INJECTION, EMULSION INTRAVENOUS at 12:00

## 2022-06-13 RX ADMIN — SODIUM CHLORIDE: 9 INJECTION, SOLUTION INTRAVENOUS at 11:36

## 2022-06-13 RX ADMIN — PROPOFOL 30 MG: 10 INJECTION, EMULSION INTRAVENOUS at 11:57

## 2022-06-13 NOTE — ANESTHESIA POSTPROCEDURE EVALUATION
Post-Op Assessment Note    CV Status:  Stable  Pain Score: 0    Pain management: adequate     Mental Status:  Alert and awake   Hydration Status:  Euvolemic   PONV Controlled:  Controlled   Airway Patency:  Patent      Post Op Vitals Reviewed: Yes      Staff: CRNA         No complications documented      /57   Temp     Pulse  65   Resp   16   SpO2   100%

## 2022-06-13 NOTE — ANESTHESIA PREPROCEDURE EVALUATION
Procedure:  COLONOSCOPY  EGD    Relevant Problems   CARDIO   (+) Essential hypertension      GI/HEPATIC   (+) GERD (gastroesophageal reflux disease)      PULMONARY   (+) Obstructive sleep apnea (CPAP)      H/o bladder cancer s/p resection  H/o intracranial meningioma s/p resection (2015)  Class II obesity    Physical Exam    Airway    Mallampati score: II  TM Distance: <3 FB  Neck ROM: full     Dental   Comment: Lower partials, upper dentures and lower dentures,     Cardiovascular      Pulmonary      Other Findings        Anesthesia Plan  ASA Score- 2     Anesthesia Type- IV sedation with anesthesia with ASA Monitors  Additional Monitors:   Airway Plan:     Comment: Last of PO intake: before midnight    Patient electing to keep dentures in mouth after speaking to GI team        Plan Factors-    Chart reviewed  Patient summary reviewed  Patient is not a current smoker  Induction- intravenous  Postoperative Plan-     Informed Consent- Anesthetic plan and risks discussed with patient  I personally reviewed this patient with the CRNA  Discussed and agreed on the Anesthesia Plan with the CRNA  Myron Cao

## 2022-06-13 NOTE — INTERVAL H&P NOTE
H&P reviewed  After examining the patient I find no changes in the patients condition since the H&P had been written      Vitals:    06/13/22 1127   BP: 122/57   Pulse: 67   Resp: 17   Temp: (!) 96 3 °F (35 7 °C)   SpO2: 99%

## 2022-06-13 NOTE — DISCHARGE SUMMARY
Discharge Summary - Yonatan Cisneros 61 y o  female MRN: [id number]    Unit/Bed#:  Encounter: [id number]    Admission Date:  06/13/2022    Admitting Diagnosis: Polyp of colon, unspecified part of colon, unspecified type [K63 5]  Diverticulosis [K57 90]  Gastroesophageal reflux disease with esophagitis without hemorrhage [K21 00]    HPI:  History of colon polyp and reflux  Procedures Performed: No orders of the defined types were placed in this encounter  Summary of Hospital Course: Tolerated procedure well    Significant Findings, Care, Treatment and Services Provided:  Reflux esophagitis noted  Small hiatal hernia  Colon polyp removed  Minimal diverticulosis  Complications:  None    Discharge Diagnosis:  See above    Medical Problems             Resolved Problems  Date Reviewed: 5/30/2022   None                 Condition at Discharge: good         Discharge instructions/Information to patient and family:   See after visit summary for information provided to patient and family  Provisions for Follow-Up Care:  See after visit summary for information related to follow-up care and any pertinent home health orders        PCP: Anson Pantoja DO    Disposition: Home

## 2022-06-13 NOTE — ANESTHESIA PREPROCEDURE EVALUATION
Procedure:  PRE-OP ONLY    Relevant Problems   CARDIO   (+) Essential hypertension      GI/HEPATIC   (+) GERD (gastroesophageal reflux disease)      PULMONARY   (+) Obstructive sleep apnea (CPAP)      H/o bladder cancer s/p resection  H/o intracranial meningioma s/p resection (2015)  Class II obesity         Anesthesia Plan  ASA Score- 2     Anesthesia Type- IV sedation with anesthesia with ASA Monitors  Additional Monitors:   Airway Plan:     Comment: Last of PO intake:  Plan Factors-    Chart reviewed  Patient summary reviewed              Induction-     Postoperative Plan-     Informed Consent-

## 2022-06-13 NOTE — H&P
History and Physical - [facility]  Dave Durán 61 y o  female MRN: [id number]                  HPI: Dave Durán is a 61y o  year old female who presents for upper endoscopy and colonoscopy  History of gastroesophageal reflux disease and colon polyp  REVIEW OF SYSTEMS: Per the HPI, and otherwise unremarkable  Historical Information   Past Medical History:   Diagnosis Date    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[address] )     Brain tumor (benign) (HCC)     Colon polyp     Constipation     CPAP (continuous positive airway pressure) dependence     GERD (gastroesophageal reflux disease)     Hiatal hernia     History of arteriovenous malformation     Liver problem     Sleep apnea      Past Surgical History:   Procedure Laterality Date    BRAIN SURGERY      BREAST BIOPSY Right     COLONOSCOPY      CRANIOTOMY      EGD      LIVER SURGERY      adenoma removed from liver    [place] COLONOSCOPY FLX DX W/COLLJ SPEC WHEN PFRMD N/A 2017    Procedure: EGD AND COLONOSCOPY;  Surgeon: Karina Lei MD;  Location: [facility] GI LAB; Service: Gastroenterology    [place] CYSTOURETHROSCOPY,FULGUR <0 5 CM LESN N/A 2019    Procedure: TRANSURETHRAL RESECTION OF BLADDER TUMOR (TURBT);   Surgeon: Alex Conde MD;  Location: [facility];  Service: Urology    TUBAL LIGATION       Social History   Social History     Substance and Sexual Activity   Alcohol Use Yes    Comment: occasional     Social History     Substance and Sexual Activity   Drug Use No     Social History     Tobacco Use   Smoking Status Former Smoker    Packs/day: 3 00    Years: 20 00    Pack years: 60 00    Quit date:     Years since quittin 4   Smokeless Tobacco Never Used     Family History   Problem Relation Age of Onset    Cancer Mother 79        lung cancer    Lung cancer Mother     Cancer Father 45        brain cancer    Brain cancer Father     Melanoma Sister     Lung cancer Brother     Prostate cancer Brother     Obesity Daughter     Diabetes Son     Obesity Son     Asthma Son     Ovarian cancer Maternal Grandmother     Breast cancer Paternal Grandmother        Meds/Allergies       Current Outpatient Medications:     B Complex-C CAPS    co-enzyme Q-10 30 MG capsule    Collagen 500 MG CAPS    LORATADINE ALLERGY RELIEF PO    magnesium gluconate (MAGONATE) 500 mg tablet    Mirabegron ER 50 MG TB24    zinc gluconate 50 mg tablet    acetaminophen (TYLENOL) 500 mg tablet    Ascorbic Acid (VITAMIN C GUMMIES PO)    CALCIUM-MAGNESIUM-ZINC PO    estradiol (ESTRACE VAGINAL) 0 1 mg/g vaginal cream    No Known Allergies    Objective     /57   Pulse 67   Temp (!) 96 3 °F (35 7 °C) (Temporal)   Resp 17   Ht 4' 11" (1 499 m)   Wt 64 9 kg (143 lb)   SpO2 99%   BMI 28 88 kg/m²       PHYSICAL EXAM    Gen: NAD  Head: NCAT  CV: RRR  CHEST: Clear  ABD: soft, NT/ND  EXT: no edema      ASSESSMENT/PLAN:  This is a 61y o  year old female here for EGD and colonoscopy, and she is stable and optimized for her procedure

## 2022-06-22 ENCOUNTER — APPOINTMENT (OUTPATIENT)
Dept: RADIOLOGY | Facility: HOSPITAL | Age: 67
End: 2022-06-22
Attending: OBSTETRICS & GYNECOLOGY
Payer: COMMERCIAL

## 2022-06-22 ENCOUNTER — HOSPITAL ENCOUNTER (OUTPATIENT)
Dept: RADIOLOGY | Facility: HOSPITAL | Age: 67
Discharge: HOME | End: 2022-06-22
Attending: OBSTETRICS & GYNECOLOGY
Payer: COMMERCIAL

## 2022-06-22 DIAGNOSIS — M81.0 OSTEOPOROSIS, UNSPECIFIED OSTEOPOROSIS TYPE, UNSPECIFIED PATHOLOGICAL FRACTURE PRESENCE: ICD-10-CM

## 2022-06-22 PROCEDURE — 77080 DXA BONE DENSITY AXIAL: CPT

## 2022-06-29 ENCOUNTER — PROCEDURE VISIT (OUTPATIENT)
Dept: UROLOGY | Facility: AMBULATORY SURGERY CENTER | Age: 67
End: 2022-06-29
Payer: COMMERCIAL

## 2022-06-29 VITALS
DIASTOLIC BLOOD PRESSURE: 78 MMHG | HEART RATE: 65 BPM | OXYGEN SATURATION: 97 % | BODY MASS INDEX: 31.51 KG/M2 | SYSTOLIC BLOOD PRESSURE: 136 MMHG | WEIGHT: 156 LBS

## 2022-06-29 DIAGNOSIS — C67.2 MALIGNANT NEOPLASM OF LATERAL WALL OF URINARY BLADDER (HCC): ICD-10-CM

## 2022-06-29 DIAGNOSIS — C67.9 ADENOCARCINOMA OF BLADDER (HCC): Primary | ICD-10-CM

## 2022-06-29 DIAGNOSIS — R35.0 URINARY FREQUENCY: ICD-10-CM

## 2022-06-29 PROCEDURE — 81002 URINALYSIS NONAUTO W/O SCOPE: CPT | Performed by: UROLOGY

## 2022-06-29 PROCEDURE — 99214 OFFICE O/P EST MOD 30 MIN: CPT | Performed by: UROLOGY

## 2022-06-29 PROCEDURE — 1036F TOBACCO NON-USER: CPT | Performed by: UROLOGY

## 2022-06-29 PROCEDURE — 52000 CYSTOURETHROSCOPY: CPT | Performed by: UROLOGY

## 2022-06-29 RX ORDER — CEFAZOLIN SODIUM 1 G/50ML
1000 SOLUTION INTRAVENOUS ONCE
OUTPATIENT
Start: 2022-06-29 | End: 2022-06-29

## 2022-06-29 RX ORDER — SODIUM CHLORIDE 9 MG/ML
125 INJECTION, SOLUTION INTRAVENOUS CONTINUOUS
OUTPATIENT
Start: 2022-06-29

## 2022-06-29 NOTE — PROGRESS NOTES
Cystoscopy     Date/Time 6/29/2022 2:33 PM     Performed by  Garret Zepeda MD     Authorized by Garret Zepeda MD          Humera Woodall is a 24-year-old female with history of low-grade superficial bladder cancer  Her last tumor was in December of 2019  She returns to the office today for routine surveillance cystoscopy  Her last upper tract imaging was a CT renal protocol in November 2019  Cystoscopy Procedure note    Risk and benefits of flexible cystoscopy were discussed  Informed consent was obtained  A urine dip is adequate for cystoscopy  The patient was placed into the modified supine position  Her genitalia was prepped and draped in a sterile fashion  Viscous lidocaine was instilled into the urethra  Flexible cystoscopy was then performed  The bladder was thoroughly inspected  Both ureteral orifices were visualized with clear efflux of urine  The bladder mucosa was thoroughly inspected  Adjacent to the left ureteral orifice a small frondular recurrence of bladder cancer was identified Retroflexion was normal    A female office staff member was present throughout the entire procedure  Impression:  Recurrent urothelial carcinoma the bladder  Plan:  I recommend cystoscopy with bladder biopsy and fulguration in the operating room  I would hold on intravesical mitomycin based on the small size of this lesion  We discussed this could potentially be a benign polyp  Risk and benefits of the procedure including, but not limited to bleeding, infection, perforation, recurrence, need for more procedures are surgery were discussed reviewed informed consent obtained

## 2022-06-30 ENCOUNTER — TELEPHONE (OUTPATIENT)
Dept: UROLOGY | Facility: AMBULATORY SURGERY CENTER | Age: 67
End: 2022-06-30

## 2022-06-30 ENCOUNTER — PREP FOR PROCEDURE (OUTPATIENT)
Dept: UROLOGY | Facility: AMBULATORY SURGERY CENTER | Age: 67
End: 2022-06-30

## 2022-06-30 DIAGNOSIS — R39.89 SUSPECTED UTI: ICD-10-CM

## 2022-06-30 DIAGNOSIS — C67.2 MALIGNANT NEOPLASM OF LATERAL WALL OF URINARY BLADDER (HCC): Primary | ICD-10-CM

## 2022-06-30 DIAGNOSIS — Z01.810 PREOP CARDIOVASCULAR EXAM: ICD-10-CM

## 2022-06-30 NOTE — TELEPHONE ENCOUNTER
I spoke with pt this afternoon to discuss scheduling her procedure with Dr Devin Irene at the [facility]  I was able to speak with pt and offered to schedule her on 8/28/2022 but pt declined and stated that she is not available that day  I then offered to schedule her on 8/30/22 at the [facility] and pt confirmed that this will work for her  I then verbally went over all of pt 's pre op instructions and prep information with her  She is aware that she needs to have a pre op urine culture and EKG done 2-3 weeks prior to surgery  Pt is also aware that she needs to have a  on the day of surgery and to stop any Aspirin and/or vitamins 1 week prior  Per pt 's request I am mailing her a copy of her surgical packet and instructed her to call our office with any questions or concerns regarding this procedure

## 2022-07-06 DIAGNOSIS — R35.0 URINARY FREQUENCY: ICD-10-CM

## 2022-07-06 RX ORDER — MIRABEGRON 50 MG/1
TABLET, FILM COATED, EXTENDED RELEASE ORAL
Qty: 90 TABLET | Refills: 3 | Status: SHIPPED | OUTPATIENT
Start: 2022-07-06

## 2022-08-15 ENCOUNTER — CLINICAL SUPPORT (OUTPATIENT)
Dept: URGENT CARE | Facility: CLINIC | Age: 67
End: 2022-08-15
Payer: COMMERCIAL

## 2022-08-15 ENCOUNTER — APPOINTMENT (OUTPATIENT)
Dept: LAB | Facility: CLINIC | Age: 67
End: 2022-08-15
Payer: COMMERCIAL

## 2022-08-15 DIAGNOSIS — C67.2 MALIGNANT NEOPLASM OF LATERAL WALL OF URINARY BLADDER (HCC): ICD-10-CM

## 2022-08-15 DIAGNOSIS — R39.89 SUSPECTED UTI: ICD-10-CM

## 2022-08-15 DIAGNOSIS — Z01.810 PREOP CARDIOVASCULAR EXAM: ICD-10-CM

## 2022-08-15 PROCEDURE — 87086 URINE CULTURE/COLONY COUNT: CPT

## 2022-08-16 LAB
ATRIAL RATE: 59 BPM
P AXIS: 66 DEGREES
PR INTERVAL: 168 MS
QRS AXIS: 56 DEGREES
QRSD INTERVAL: 82 MS
QT INTERVAL: 426 MS
QTC INTERVAL: 421 MS
T WAVE AXIS: 45 DEGREES
VENTRICULAR RATE: 59 BPM

## 2022-08-16 PROCEDURE — 93010 ELECTROCARDIOGRAM REPORT: CPT | Performed by: INTERNAL MEDICINE

## 2022-08-17 LAB
BACTERIA UR CULT: ABNORMAL
BACTERIA UR CULT: ABNORMAL

## 2022-08-18 PROCEDURE — 93010 ELECTROCARDIOGRAM REPORT: CPT | Performed by: INTERNAL MEDICINE

## 2022-08-26 NOTE — PRE-PROCEDURE INSTRUCTIONS
Pre-Surgery Instructions:   Medication Instructions    acetaminophen (TYLENOL) 500 mg tablet Uses PRN- OK to take day of surgery    Ascorbic Acid (VITAMIN C GUMMIES PO) Stop taking 7 days prior to surgery   B Complex-C CAPS Stop taking 7 days prior to surgery   Calcium Carb-Cholecalciferol (OSCAL-D) 500 mg-200 units per tablet Stop taking 7 days prior to surgery   co-enzyme Q-10 30 MG capsule Stop taking 7 days prior to surgery   famotidine-calcium carbonate-magnesium hydroxide (PEPCID COMPLETE) -165 MG CHEW Hold day of surgery   LORATADINE ALLERGY RELIEF PO Hold day of surgery   Myrbetriq 50 MG TB24 Hold day of surgery  Covid screening negative as per patient  Fully vaccinated  Reviewed showering and medication instructions  Patient verbalized understanding  Advised NPO after MN and ASC will call with scheduled surgical time  yes...

## 2022-08-30 ENCOUNTER — TELEPHONE (OUTPATIENT)
Dept: UROLOGY | Facility: AMBULATORY SURGERY CENTER | Age: 67
End: 2022-08-30

## 2022-08-30 ENCOUNTER — HOSPITAL ENCOUNTER (OUTPATIENT)
Facility: HOSPITAL | Age: 67
Setting detail: OUTPATIENT SURGERY
Discharge: HOME | End: 2022-08-30
Attending: UROLOGY | Admitting: UROLOGY
Payer: COMMERCIAL

## 2022-08-30 ENCOUNTER — ANESTHESIA (OUTPATIENT)
Dept: PERIOP | Facility: HOSPITAL | Age: 67
End: 2022-08-30
Payer: COMMERCIAL

## 2022-08-30 ENCOUNTER — ANESTHESIA EVENT (OUTPATIENT)
Dept: PERIOP | Facility: HOSPITAL | Age: 67
End: 2022-08-30
Payer: COMMERCIAL

## 2022-08-30 VITALS
RESPIRATION RATE: 18 BRPM | HEIGHT: 59 IN | SYSTOLIC BLOOD PRESSURE: 115 MMHG | OXYGEN SATURATION: 99 % | BODY MASS INDEX: 30.44 KG/M2 | HEART RATE: 59 BPM | TEMPERATURE: 96.7 F | WEIGHT: 151 LBS | DIASTOLIC BLOOD PRESSURE: 57 MMHG

## 2022-08-30 DIAGNOSIS — C67.2 MALIGNANT NEOPLASM OF LATERAL WALL OF URINARY BLADDER (HCC): ICD-10-CM

## 2022-08-30 PROCEDURE — 87077 CULTURE AEROBIC IDENTIFY: CPT | Performed by: UROLOGY

## 2022-08-30 PROCEDURE — 52204 CYSTOSCOPY W/BIOPSY(S): CPT | Performed by: UROLOGY

## 2022-08-30 PROCEDURE — 87186 SC STD MICRODIL/AGAR DIL: CPT | Performed by: UROLOGY

## 2022-08-30 PROCEDURE — 87086 URINE CULTURE/COLONY COUNT: CPT | Performed by: UROLOGY

## 2022-08-30 PROCEDURE — NC001 PR NO CHARGE: Performed by: UROLOGY

## 2022-08-30 PROCEDURE — 88305 TISSUE EXAM BY PATHOLOGIST: CPT | Performed by: SPECIALIST

## 2022-08-30 RX ORDER — PROPOFOL 10 MG/ML
INJECTION, EMULSION INTRAVENOUS AS NEEDED
Status: DISCONTINUED | OUTPATIENT
Start: 2022-08-30 | End: 2022-08-30

## 2022-08-30 RX ORDER — FENTANYL CITRATE 50 UG/ML
INJECTION, SOLUTION INTRAMUSCULAR; INTRAVENOUS AS NEEDED
Status: DISCONTINUED | OUTPATIENT
Start: 2022-08-30 | End: 2022-08-30

## 2022-08-30 RX ORDER — FENTANYL CITRATE/PF 50 MCG/ML
25 SYRINGE (ML) INJECTION
Status: DISCONTINUED | OUTPATIENT
Start: 2022-08-30 | End: 2022-08-30 | Stop reason: HOSPADM

## 2022-08-30 RX ORDER — MAGNESIUM HYDROXIDE 1200 MG/15ML
LIQUID ORAL AS NEEDED
Status: DISCONTINUED | OUTPATIENT
Start: 2022-08-30 | End: 2022-08-30 | Stop reason: HOSPADM

## 2022-08-30 RX ORDER — MIDAZOLAM HYDROCHLORIDE 2 MG/2ML
INJECTION, SOLUTION INTRAMUSCULAR; INTRAVENOUS AS NEEDED
Status: DISCONTINUED | OUTPATIENT
Start: 2022-08-30 | End: 2022-08-30

## 2022-08-30 RX ORDER — CEFAZOLIN SODIUM 1 G/50ML
1000 SOLUTION INTRAVENOUS ONCE
Status: COMPLETED | OUTPATIENT
Start: 2022-08-30 | End: 2022-08-30

## 2022-08-30 RX ORDER — LIDOCAINE HYDROCHLORIDE 10 MG/ML
0.5 INJECTION, SOLUTION EPIDURAL; INFILTRATION; INTRACAUDAL; PERINEURAL ONCE AS NEEDED
Status: DISCONTINUED | OUTPATIENT
Start: 2022-08-30 | End: 2022-08-30 | Stop reason: HOSPADM

## 2022-08-30 RX ORDER — SODIUM CHLORIDE, SODIUM LACTATE, POTASSIUM CHLORIDE, CALCIUM CHLORIDE 600; 310; 30; 20 MG/100ML; MG/100ML; MG/100ML; MG/100ML
125 INJECTION, SOLUTION INTRAVENOUS CONTINUOUS
Status: DISCONTINUED | OUTPATIENT
Start: 2022-08-30 | End: 2022-08-30 | Stop reason: HOSPADM

## 2022-08-30 RX ORDER — LIDOCAINE HYDROCHLORIDE 10 MG/ML
INJECTION, SOLUTION EPIDURAL; INFILTRATION; INTRACAUDAL; PERINEURAL AS NEEDED
Status: DISCONTINUED | OUTPATIENT
Start: 2022-08-30 | End: 2022-08-30

## 2022-08-30 RX ORDER — SODIUM CHLORIDE 9 MG/ML
125 INJECTION, SOLUTION INTRAVENOUS CONTINUOUS
Status: DISCONTINUED | OUTPATIENT
Start: 2022-08-30 | End: 2022-08-30 | Stop reason: HOSPADM

## 2022-08-30 RX ORDER — ONDANSETRON 2 MG/ML
4 INJECTION INTRAMUSCULAR; INTRAVENOUS ONCE AS NEEDED
Status: DISCONTINUED | OUTPATIENT
Start: 2022-08-30 | End: 2022-08-30 | Stop reason: HOSPADM

## 2022-08-30 RX ORDER — SODIUM CHLORIDE, SODIUM LACTATE, POTASSIUM CHLORIDE, CALCIUM CHLORIDE 600; 310; 30; 20 MG/100ML; MG/100ML; MG/100ML; MG/100ML
INJECTION, SOLUTION INTRAVENOUS CONTINUOUS PRN
Status: DISCONTINUED | OUTPATIENT
Start: 2022-08-30 | End: 2022-08-30

## 2022-08-30 RX ORDER — HYDROMORPHONE HCL IN WATER/PF 6 MG/30 ML
0.2 PATIENT CONTROLLED ANALGESIA SYRINGE INTRAVENOUS
Status: DISCONTINUED | OUTPATIENT
Start: 2022-08-30 | End: 2022-08-30 | Stop reason: HOSPADM

## 2022-08-30 RX ORDER — ONDANSETRON 2 MG/ML
INJECTION INTRAMUSCULAR; INTRAVENOUS AS NEEDED
Status: DISCONTINUED | OUTPATIENT
Start: 2022-08-30 | End: 2022-08-30

## 2022-08-30 RX ORDER — METOCLOPRAMIDE HYDROCHLORIDE 5 MG/ML
INJECTION INTRAMUSCULAR; INTRAVENOUS AS NEEDED
Status: DISCONTINUED | OUTPATIENT
Start: 2022-08-30 | End: 2022-08-30

## 2022-08-30 RX ORDER — HYDROCODONE BITARTRATE AND ACETAMINOPHEN 5; 325 MG/1; MG/1
1 TABLET ORAL EVERY 4 HOURS PRN
Status: DISCONTINUED | OUTPATIENT
Start: 2022-08-30 | End: 2022-08-30 | Stop reason: HOSPADM

## 2022-08-30 RX ADMIN — CEFAZOLIN SODIUM 2000 MG: 1 SOLUTION INTRAVENOUS at 11:22

## 2022-08-30 RX ADMIN — PROPOFOL 100 MG: 10 INJECTION, EMULSION INTRAVENOUS at 11:25

## 2022-08-30 RX ADMIN — MIDAZOLAM 2 MG: 1 INJECTION INTRAMUSCULAR; INTRAVENOUS at 11:25

## 2022-08-30 RX ADMIN — SODIUM CHLORIDE, SODIUM LACTATE, POTASSIUM CHLORIDE, AND CALCIUM CHLORIDE 125 ML/HR: .6; .31; .03; .02 INJECTION, SOLUTION INTRAVENOUS at 09:50

## 2022-08-30 RX ADMIN — ONDANSETRON 4 MG: 2 INJECTION INTRAMUSCULAR; INTRAVENOUS at 11:42

## 2022-08-30 RX ADMIN — METOCLOPRAMIDE HYDROCHLORIDE 10 MG: 5 INJECTION INTRAMUSCULAR; INTRAVENOUS at 11:42

## 2022-08-30 RX ADMIN — LIDOCAINE HYDROCHLORIDE 50 MG: 10 INJECTION, SOLUTION EPIDURAL; INFILTRATION; INTRACAUDAL; PERINEURAL at 11:25

## 2022-08-30 RX ADMIN — FENTANYL CITRATE 50 MCG: 50 INJECTION INTRAMUSCULAR; INTRAVENOUS at 11:25

## 2022-08-30 RX ADMIN — Medication 25 MCG: at 12:13

## 2022-08-30 RX ADMIN — SODIUM CHLORIDE, SODIUM LACTATE, POTASSIUM CHLORIDE, AND CALCIUM CHLORIDE: .6; .31; .03; .02 INJECTION, SOLUTION INTRAVENOUS at 11:22

## 2022-08-30 NOTE — OP NOTE
OPERATIVE REPORT  PATIENT NAME: Damon Magaña    :  1955  MRN: [id number]  Pt Location: [facility] 01    SURGERY DATE: 2022    Surgeon(s) and Role:     Rajendra Peng MD - Primary    Preop Diagnosis:  Malignant neoplasm of lateral wall of urinary bladder ([address] ) [C67 2]    Post-Op Diagnosis Codes:     * Malignant neoplasm of lateral wall of urinary bladder ([address] ) [C67 2]    Procedure(s) (LRB):  CYSTOSCOPY WITH BLADDER BIOPSY, FULGURATION (N/A)    Specimen(s):  ID Type Source Tests Collected by Time Destination   1 : bladder biopsy Tissue Urinary Bladder TISSUE EXAM Rajendra Peng MD 2022 1107    A :  Urine Urine, Cystoscopic URINE CULTURE Rajendra Peng MD 2022 1140        Estimated Blood Loss:   Minimal    Drains:  None    Anesthesia Type:   General    Operative Indications:  Malignant neoplasm of lateral wall of urinary bladder ([address] ) [C67 2]      Operative Findings:  Small frondular bladder tumor versus benign polyp located adjacent to the left ureteral orifice removed with cold cup biopsy forceps  Complications:   None    Procedure and Technique: Hannah Graves is a 79-year-old female with a history of urothelial carcinoma bladder  I recently performed office cystoscopy and found a very small frondular recurrence adjacent to the left ureteral orifice  She returns to the OR today for cystoscopy with bladder biopsy  Based on the very small size of the lesion and the fact that this could potentially be a benign polyp by will hold on intravesical mitomycin  Risk and benefits of the procedure discussed reviewed informed consent obtained  Patient brought to the operating room on 2022  After smooth induction general LMA anesthesia, patient was placed in dorsal lithotomy position  Her genitalia was prepped and draped in sterile fashion  Intravenous antibiotics were administered    A time-out was performed with all members the operative team confirming the patient's identity, and procedure to be performed  A 22 [place] rigid cystoscope with 30 degree lens was inserted  The bladder was thoroughly inspected  A single small solitary papillary recurrence was identified adjacent to the left ureteral orifice  The remainder of the bladder was free and clear mucosal abnormalities or lesions  Cold cup biopsy forceps were utilized to remove the small bladder polyp  The specimen was sent for permanent section  Bugbee electrocautery was used for hemostasis  Care was taken to avoid the left ureteral orifice which demonstrated patency after cauterization  Hemostasis was excellent  The bladder was emptied the cystoscope was removed  Overall the patient tolerated the procedure well number no complications  The patient was extubated in the operating room transferred the PACU in stable condition at the conclusion of the case      Patient Disposition:  PACU stable and extubated      SIGNATURE: Shawn Brasher MD  DATE: August 30, 2022  TIME: 11:48 AM

## 2022-08-30 NOTE — ANESTHESIA PREPROCEDURE EVALUATION
Procedure:  CYSTOSCOPY WITH BIOPSIES (N/A Bladder)    Relevant Problems   CARDIO   (+) Essential hypertension      GI/HEPATIC   (+) GERD (gastroesophageal reflux disease)      PULMONARY   (+) Obstructive sleep apnea       CAD/PCI/MI/CHF -- denies  COPD/ASTHMA/MILEY -- MILEY on CPAP  PROBS WITH PRIOR ANESTHESIA -- denies  NPO STATUS CONFIRMED    Lab Results   Component Value Date    SODIUM 141 04/01/2022    K 4 1 04/01/2022    BUN 22 04/01/2022    CREATININE 0 85 04/01/2022    EGFR 72 04/01/2022    GLUCOSE 140 05/09/2015     No results found for: HGBA1C    Lab Results   Component Value Date    HGB 12 3 04/01/2022    HGB 13 9 07/09/2020    HGB 12 8 11/09/2019     04/01/2022     07/09/2020     11/09/2019     Lab Results   Component Value Date    WBC 4 01 (L) 04/01/2022       Lab Results   Component Value Date    CREATININE 0 85 04/01/2022    CREATININE 0 83 07/09/2020    CREATININE 0 89 11/09/2019       Lab Results   Component Value Date    INR 0 95 05/02/2015     Lab Results   Component Value Date    PTT 26 05/02/2015       No results found for: LACTATE      Type and Screen  A                      Physical Exam    Airway    Mallampati score: II  TM Distance: >3 FB       Dental   No notable dental hx     Cardiovascular      Pulmonary      Other Findings        Anesthesia Plan  ASA Score- 2     Anesthesia Type- general with ASA Monitors  Additional Monitors:   Airway Plan: LMA  Comment:  SUMANTH Edge , have personally seen and evaluated the patient prior to anesthetic care  I have reviewed the pre-anesthetic record, and other medical records if appropriate to the anesthetic care  If a CRNA is involved in the case, I have reviewed the CRNA assessment, if present, and agree  Risks/benefits and alternatives discussed with patient including possible PONV, sore throat, and possibility of rare anesthetic and surgical emergencies          Plan Factors-    Chart reviewed  Existing labs reviewed  Patient summary reviewed  Patient instructed to abstain from smoking on day of procedure  There is medical exclusion for perioperative obstructive sleep apnea risk education  Induction- intravenous  Postoperative Plan- Plan for postoperative opioid use  Planned trial extubation    Informed Consent- Anesthetic plan and risks discussed with patient  I personally reviewed this patient with the CRNA  Discussed and agreed on the Anesthesia Plan with the HUBERT Bruce

## 2022-08-30 NOTE — TELEPHONE ENCOUNTER
----- Message from Yanci Pineda MD sent at 8/30/2022 11:52 AM EDT -----  Please schedule follow-up with Zain Skelton in approximately 3 weeks to review pathology  This is likely a small benign bladder polyp or possibly low-grade superficial recurrent bladder cancer  Please schedule flexible cystoscopy with me in 3 months    Thx     FT

## 2022-08-30 NOTE — H&P
Hannah Graves is a 68-year-old female with history of low-grade superficial bladder cancer  Her last tumor was in December of 2019  She returns to the office today for routine surveillance cystoscopy  Her last upper tract imaging was a CT renal protocol in November 2019            Cystoscopy Procedure note June 2022:     Risk and benefits of flexible cystoscopy were discussed  Informed consent was obtained  A urine dip is adequate for cystoscopy  The patient was placed into the modified supine position  Her genitalia was prepped and draped in a sterile fashion  Viscous lidocaine was instilled into the urethra  Flexible cystoscopy was then performed  The bladder was thoroughly inspected  Both ureteral orifices were visualized with clear efflux of urine  The bladder mucosa was thoroughly inspected  Adjacent to the left ureteral orifice a small frondular recurrence of bladder cancer was identified Retroflexion was normal    A female office staff member was present throughout the entire procedure      Impression:  Recurrent urothelial carcinoma the bladder      Plan:  I recommend cystoscopy with bladder biopsy and fulguration in the operating room  I would hold on intravesical mitomycin based on the small size of this lesion  We discussed this could potentially be a benign polyp  Risk and benefits of the procedure including, but not limited to bleeding, infection, perforation, recurrence, need for more procedures are surgery were discussed reviewed informed consent obtained  /73   Pulse 62   Temp 98 °F (36 7 °C) (Temporal)   Resp 20   Ht 4' 11" (1 499 m)   Wt 68 5 kg (151 lb)   SpO2 99%   BMI 30 50 kg/m²     On examination she is in no acute distress  Respiratory no distress  Cardiac is regular  Abdomen is soft nontender nondistended  Affect normal   Extremities without edema    Neurologic is grossly intact nonfocal HEENT normocephalic atraumatic, sclerae anicteric

## 2022-08-30 NOTE — ANESTHESIA POSTPROCEDURE EVALUATION
Post-Op Assessment Note    CV Status:  Stable  Pain Score: 0    Pain management: adequate     Mental Status:  Alert and awake   Hydration Status:  Euvolemic   PONV Controlled:  Controlled   Airway Patency:  Patent      Post Op Vitals Reviewed: Yes      Staff: CRNA         No complications documented      /63 (08/30/22 1152)    Temp 98 6 °F (37 °C) (08/30/22 1152)    Pulse 65 (08/30/22 1152)   Resp   16   SpO2   100

## 2022-08-31 NOTE — TELEPHONE ENCOUNTER
Post Op Note    Fay Arevalo is a 61 y o  female s/p CYSTOSCOPY WITH BLADDER BIOPSY, FULGURATION (N/A) performed 8/30/2022  Fay Arevalo is a patient of Dr Crystal Tolbert and is seen at the [place] office  How would you rate your pain on a scale from 1 to 10, 10 being the worst pain ever? 4  Have you had a fever? No  Have your bowel movements been regular? Yes  Do you have any difficulty urinating? No  Do you have any other questions or concerns that I can address at this time? None at this time  Patient is doing well overall  Pain is being managed at this time  Reminded of follow up appointment and reviewed side effects for post op

## 2022-09-03 LAB
BACTERIA UR CULT: ABNORMAL

## 2022-09-06 PROCEDURE — 88305 TISSUE EXAM BY PATHOLOGIST: CPT | Performed by: SPECIALIST

## 2022-09-13 ENCOUNTER — OFFICE VISIT (OUTPATIENT)
Dept: UROLOGY | Facility: AMBULATORY SURGERY CENTER | Age: 67
End: 2022-09-13
Payer: COMMERCIAL

## 2022-09-13 VITALS
WEIGHT: 151 LBS | SYSTOLIC BLOOD PRESSURE: 130 MMHG | DIASTOLIC BLOOD PRESSURE: 80 MMHG | HEIGHT: 59 IN | HEART RATE: 60 BPM | BODY MASS INDEX: 30.44 KG/M2

## 2022-09-13 DIAGNOSIS — C67.0 MALIGNANT NEOPLASM OF TRIGONE OF URINARY BLADDER (HCC): Primary | ICD-10-CM

## 2022-09-13 PROCEDURE — 99213 OFFICE O/P EST LOW 20 MIN: CPT | Performed by: UROLOGY

## 2022-09-13 NOTE — PROGRESS NOTES
9/13/2022    Himelissa Jean-Claude  1955  [id number]        Assessment  Bladder papilloma with atypia      Discussion  I discussed with the patient and her  that this small polyp shows benign characteristics with mild atypia  I recommend follow-up in 6 months with a surveillance cystoscopy  There is no indication for intravesical therapy or repeat biopsy at this time  She was instructed to call sooner if she were to develop gross hematuria  History of Present Illness  61 y o  female with a history of a small bladder polyp located along the left trigone  This was recently removed in the operating room  She returns in follow-up today  Pathology is consistent with a urothelial papilloma with mild atypia  She returns in follow-up today with her  for discussion  AUA Symptom Score      Review of Systems  Review of Systems   Constitutional: Negative  HENT: Negative  Eyes: Negative  Respiratory: Negative  Cardiovascular: Negative  Gastrointestinal: Negative  Endocrine: Negative  Genitourinary:        Per HPI   Musculoskeletal: Negative  Skin: Negative  Allergic/Immunologic: Negative  Neurological: Negative  Hematological: Negative  Psychiatric/Behavioral: Negative            Past Medical History  Past Medical History:   Diagnosis Date    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[address] )     Brain tumor (benign) (HCC)     Colon polyp     Constipation     CPAP (continuous positive airway pressure) dependence     GERD (gastroesophageal reflux disease)     Hiatal hernia     History of arteriovenous malformation     Liver problem     cyst removed from liver    Sleep apnea        Past Social History  Past Surgical History:   Procedure Laterality Date    BRAIN SURGERY      BREAST BIOPSY Right     COLONOSCOPY      CRANIOTOMY      CYSTOSCOPY      EGD      LIVER SURGERY      adenoma removed from liver    [place] COLONOSCOPY FLX DX W/COLLJ SPEC WHEN PFRMD N/A 03/20/2017 Procedure: EGD AND COLONOSCOPY;  Surgeon: Krunal Aparicio MD;  Location: [facility]; Service: Gastroenterology    [place] CYSTOURETHROSCOPY,BIOPSY N/A 2022    Procedure: CYSTOSCOPY WITH BLADDER BIOPSY, FULGURATION;  Surgeon: Nalini Farley MD;  Location[facility];  Service: Urology    [place] CYSTOURETHROSCOPY,FULGUR <0 5 CM LESN N/A 2019    Procedure: TRANSURETHRAL RESECTION OF BLADDER TUMOR (TURBT);   Surgeon: John Oswald MD;  [facility];  Service: Urology    TUBAL LIGATION         Past Family History  Family History   Problem Relation Age of Onset    Cancer Mother 79        lung cancer    Lung cancer Mother     Cancer Father 45        brain cancer    Brain cancer Father     Melanoma Sister     Lung cancer Brother     Prostate cancer Brother     Obesity Daughter     Diabetes Son     Obesity Son     Asthma Son     Ovarian cancer Maternal Grandmother     Breast cancer Paternal Grandmother        Past Social history  Social History     Socioeconomic History    Marital status: /Civil Union     Spouse name: Not on file    Number of children: Not on file    Years of education: Not on file    Highest education level: Not on file   Occupational History    Not on file   Tobacco Use    Smoking status: Former Smoker     Packs/day: 3 00     Years: 20 00     Pack years: 60 00     Quit date:      Years since quittin 7    Smokeless tobacco: Never Used   Vaping Use    Vaping Use: Never used   Substance and Sexual Activity    Alcohol use: Yes     Comment: occasional    Drug use: No    Sexual activity: Yes     Partners: Male     Birth control/protection: Post-menopausal, Female Sterilization   Other Topics Concern    Not on file   Social History Narrative    Not on file     Social Determinants of Health     Financial Resource Strain: Not on file   Food Insecurity: Not on file   Transportation Needs: Not on file   Physical Activity: Not on file   Stress: Not on file   Social Connections: Not on file   Intimate Partner Violence: Not on file   Housing Stability: Not on file       Current Medications  Current Outpatient Medications   Medication Sig Dispense Refill    acetaminophen (TYLENOL) 500 mg tablet Take 1 tablet by mouth      Ascorbic Acid (VITAMIN C GUMMIES PO) Take by mouth 2 gummies a day      B Complex-C CAPS Take 1 capsule by mouth daily      Calcium Carb-Cholecalciferol (OSCAL-D) 500 mg-200 units per tablet Take 1 tablet by mouth 2 (two) times a day with meals      CALCIUM-MAGNESIUM-ZINC PO Take by mouth daily      co-enzyme Q-10 30 MG capsule Take 30 mg by mouth 3 (three) times a day      famotidine-calcium carbonate-magnesium hydroxide (PEPCID COMPLETE) -165 MG CHEW Chew 1 tablet daily as needed for heartburn      LORATADINE ALLERGY RELIEF PO Take by mouth      Myrbetriq 50 MG TB24 take 1 tablet by mouth once daily 90 tablet 3    estradiol (ESTRACE VAGINAL) 0 1 mg/g vaginal cream Insert 0 5 g into the vagina 3 (three) times a week (Patient not taking: No sig reported) 42 5 g 10    magnesium gluconate (MAGONATE) 500 mg tablet Take 500 mg by mouth once (Patient not taking: No sig reported)      zinc gluconate 50 mg tablet Take 50 mg by mouth daily (Patient not taking: No sig reported)       No current facility-administered medications for this visit  Allergies  No Known Allergies    Past Medical History, Social History, Family History, medications and allergies were reviewed  Vitals  Vitals:    09/13/22 1134   BP: 130/80   Pulse: 60   Weight: 68 5 kg (151 lb)   Height: 4' 11" (1 499 m)       Physical Exam  Physical Exam  On examination she is in no acute distress    Gait normal   Affect normal    Results  No results found for: PSA  Lab Results   Component Value Date    GLUCOSE 140 05/09/2015    CALCIUM 9 3 04/01/2022     05/09/2015    K 4 1 04/01/2022    CO2 29 04/01/2022     04/01/2022    BUN 22 04/01/2022    CREATININE 0 85 04/01/2022     Lab Results   Component Value Date    WBC 4 01 (L) 04/01/2022    HGB 12 3 04/01/2022    HCT 38 6 04/01/2022    MCV 89 04/01/2022     04/01/2022         Office Urine Dip  No results found for this or any previous visit (from the past 1 hour(s)) ]

## 2022-11-23 ENCOUNTER — OFFICE VISIT (OUTPATIENT)
Dept: URGENT CARE | Facility: CLINIC | Age: 67
End: 2022-11-23

## 2022-11-23 VITALS
DIASTOLIC BLOOD PRESSURE: 90 MMHG | RESPIRATION RATE: 18 BRPM | BODY MASS INDEX: 31.1 KG/M2 | TEMPERATURE: 98 F | HEART RATE: 74 BPM | WEIGHT: 154 LBS | OXYGEN SATURATION: 98 % | SYSTOLIC BLOOD PRESSURE: 170 MMHG

## 2022-11-23 DIAGNOSIS — L73.8 FOLLICULITIS BARBAE: Primary | ICD-10-CM

## 2022-11-23 RX ORDER — CEPHALEXIN 500 MG/1
500 CAPSULE ORAL EVERY 12 HOURS SCHEDULED
Qty: 10 CAPSULE | Refills: 0 | Status: SHIPPED | OUTPATIENT
Start: 2022-11-23 | End: 2022-11-28

## 2022-11-23 NOTE — PROGRESS NOTES
[facility]        NAME: Kashmir Heath is a 61 y o  female  : 1955    MRN: [id number]  DATE: 2022  TIME: 5:48 PM    Assessment and Plan   Folliculitis barbae [I64 3]  1  Folliculitis barbae  cephalexin (KEFLEX) 500 mg capsule        Keflex prescribed for the next 5 days due to probable folliculitis  Patient Instructions     Follow up with PCP in 3-5 days  Proceed to  ER if symptoms worsen  Chief Complaint     Chief Complaint   Patient presents with   • Rash     Scalp x  , right side of scalp , with swelling around right eye          History of Present Illness       62-year-old female presents today with 3 days of a rash the right temple of her scalp  Denies any direct trauma or contact with brace of chemicals  Does have feral cats which she pets while wearing gloves  Has been applying hydrocortisone cream and taking OTC pain reliever due to itching and burning sensation  Rash  Pertinent negatives include no congestion, cough, fever, rhinorrhea, shortness of breath or sore throat  Review of Systems   Review of Systems   Constitutional: Negative for chills and fever  HENT: Negative for congestion, rhinorrhea and sore throat  Respiratory: Negative for cough, shortness of breath and wheezing  Cardiovascular: Negative for chest pain  Gastrointestinal: Negative for abdominal pain and nausea  Musculoskeletal: Negative for arthralgias  Skin: Positive for color change, rash and wound  Neurological: Negative for dizziness and headaches       Current Medications       Current Outpatient Medications:   •  cephalexin (KEFLEX) 500 mg capsule, Take 1 capsule (500 mg total) by mouth every 12 (twelve) hours for 5 days, Disp: 10 capsule, Rfl: 0  •  acetaminophen (TYLENOL) 500 mg tablet, Take 1 tablet by mouth, Disp: , Rfl:   •  Ascorbic Acid (VITAMIN C GUMMIES PO), Take by mouth 2 gummies a day, Disp: , Rfl:   •  B Complex-C CAPS, Take 1 capsule by mouth daily, Disp: , Rfl:   •  Calcium Carb-Cholecalciferol (OSCAL-D) 500 mg-200 units per tablet, Take 1 tablet by mouth 2 (two) times a day with meals, Disp: , Rfl:   •  CALCIUM-MAGNESIUM-ZINC PO, Take by mouth daily, Disp: , Rfl:   •  co-enzyme Q-10 30 MG capsule, Take 30 mg by mouth 3 (three) times a day, Disp: , Rfl:   •  estradiol (ESTRACE VAGINAL) 0 1 mg/g vaginal cream, Insert 0 5 g into the vagina 3 (three) times a week (Patient not taking: No sig reported), Disp: 42 5 g, Rfl: 10  •  famotidine-calcium carbonate-magnesium hydroxide (PEPCID COMPLETE) -165 MG CHEW, Chew 1 tablet daily as needed for heartburn, Disp: , Rfl:   •  LORATADINE ALLERGY RELIEF PO, Take by mouth, Disp: , Rfl:   •  magnesium gluconate (MAGONATE) 500 mg tablet, Take 500 mg by mouth once (Patient not taking: No sig reported), Disp: , Rfl:   •  Myrbetriq 50 MG TB24, take 1 tablet by mouth once daily, Disp: 90 tablet, Rfl: 3  •  zinc gluconate 50 mg tablet, Take 50 mg by mouth daily (Patient not taking: No sig reported), Disp: , Rfl:     Current Allergies     Allergies as of 11/23/2022   • (No Known Allergies)            The following portions of the patient's history were reviewed and updated as appropriate: allergies, current medications, past family history, past medical history, past social history, past surgical history and problem list      Past Medical History:   Diagnosis Date   • 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[facility] [address] )    • Brain tumor (benign) ([facility] [address] )    • Colon polyp    • Constipation    • CPAP (continuous positive airway pressure) dependence    • GERD (gastroesophageal reflux disease)    • Hiatal hernia    • History of arteriovenous malformation    • Liver problem     cyst removed from liver   • Sleep apnea        Past Surgical History:   Procedure Laterality Date   • BRAIN SURGERY     • BREAST BIOPSY Right    • COLONOSCOPY     • CRANIOTOMY     • CYSTOSCOPY     • EGD     • LIVER SURGERY      adenoma removed from liver   • [place] COLONOSCOPY FLX DX W/COLLJ SPEC WHEN PFRMD N/A 03/20/2017    Procedure: EGD AND COLONOSCOPY;  Surgeon: Poncho Pickering MD;  Location: [facility]; Service: Gastroenterology   • [place] CYSTOURETHROSCOPY,BIOPSY N/A 8/30/2022    Procedure: CYSTOSCOPY WITH BLADDER BIOPSY, FULGURATION;  Surgeon: Cammie West MD;  [facility];  Service: Urology   • [place] CYSTOURETHROSCOPY,FULGUR <0 5 CM LESN N/A 12/04/2019    Procedure: TRANSURETHRAL RESECTION OF BLADDER TUMOR (TURBT); Surgeon: Mara Oquendo MD;  [facility];  Service: Urology   • TUBAL LIGATION         Family History   Problem Relation Age of Onset   • Cancer Mother 79        lung cancer   • Lung cancer Mother    • Cancer Father 45        brain cancer   • Brain cancer Father    • Melanoma Sister    • Lung cancer Brother    • Prostate cancer Brother    • Obesity Daughter    • Diabetes Son    • Obesity Son    • Asthma Son    • Ovarian cancer Maternal Grandmother    • Breast cancer Paternal Grandmother          Medications have been verified  Objective   /90   Pulse 74   Temp 98 °F (36 7 °C)   Resp 18   Wt 69 9 kg (154 lb)   SpO2 98%   BMI 31 10 kg/m²   No LMP recorded  Patient is postmenopausal        Physical Exam     Physical Exam  Vitals and nursing note reviewed  Constitutional:       General: She is in acute distress  Appearance: Normal appearance  She is normal weight  She is not ill-appearing, toxic-appearing or diaphoretic  HENT:      Head: Normocephalic  Eyes:      General:         Right eye: No discharge  Left eye: No discharge  Conjunctiva/sclera: Conjunctivae normal    Pulmonary:      Effort: Pulmonary effort is normal    Skin:     General: Skin is warm  Findings: Lesion and rash present  No erythema  Comments: Multiple red macules with 1 ulceration over the right temple  No active bleeding or purulent oozing  Neurological:      General: No focal deficit present        Mental Status: She is alert and oriented to person, place, and time  Psychiatric:         Mood and Affect: Mood normal          Behavior: Behavior normal          Thought Content:  Thought content normal          Judgment: Judgment normal

## 2023-03-15 ENCOUNTER — PROCEDURE VISIT (OUTPATIENT)
Dept: UROLOGY | Facility: AMBULATORY SURGERY CENTER | Age: 68
End: 2023-03-15

## 2023-03-15 VITALS
BODY MASS INDEX: 31.45 KG/M2 | HEART RATE: 70 BPM | DIASTOLIC BLOOD PRESSURE: 74 MMHG | SYSTOLIC BLOOD PRESSURE: 130 MMHG | WEIGHT: 156 LBS | HEIGHT: 59 IN

## 2023-03-15 DIAGNOSIS — R35.0 URINARY FREQUENCY: ICD-10-CM

## 2023-03-15 DIAGNOSIS — D41.4 BLADDER POLYP: ICD-10-CM

## 2023-03-15 DIAGNOSIS — C67.0 MALIGNANT NEOPLASM OF TRIGONE OF URINARY BLADDER (HCC): Primary | ICD-10-CM

## 2023-03-15 LAB
SL AMB  POCT GLUCOSE, UA: NORMAL
SL AMB LEUKOCYTE ESTERASE,UA: NORMAL
SL AMB POCT BILIRUBIN,UA: NORMAL
SL AMB POCT BLOOD,UA: NORMAL
SL AMB POCT CLARITY,UA: CLEAR
SL AMB POCT COLOR,UA: YELLOW
SL AMB POCT KETONES,UA: NORMAL
SL AMB POCT NITRITE,UA: NORMAL
SL AMB POCT PH,UA: 6
SL AMB POCT SPECIFIC GRAVITY,UA: 1
SL AMB POCT URINE PROTEIN: NORMAL
SL AMB POCT UROBILINOGEN: 0.2

## 2023-03-15 RX ORDER — MIRABEGRON 50 MG/1
1 TABLET, FILM COATED, EXTENDED RELEASE ORAL DAILY
Qty: 90 TABLET | Refills: 3 | Status: SHIPPED | OUTPATIENT
Start: 2023-03-15

## 2023-03-15 NOTE — PROGRESS NOTES
Cystoscopy     Date/Time 3/15/2023 8:19 AM     Performed by  Mary Reid MD     Authorized by Mary Reid MD          Yajaira Ambriz is a 44-year-old female with a history of a bladder papilloma with atypia  This small polyp was removed in August 2022  She presents today simply for surveillance cystoscopy  Her last upper tract imaging was a CT renal protocol performed in November 2019  She denies any gross hematuria  Cystoscopy Procedure note:    Risk and benefits of flexible cystoscopy were discussed  Informed consent was obtained  A urine dip is adequate for cystoscopy  The patient was placed into the modified supine position  Her genitalia was prepped and draped in a sterile fashion  Viscous lidocaine was instilled into the urethra  Flexible cystoscopy was then performed  The bladder was thoroughly inspected  Both ureteral orifices were visualized with clear efflux of urine  The bladder mucosa was thoroughly inspected  There was no evidence of mucosal abnormalities, stones, or lesions  Retroflexion was normal  Overall this was a negative cystoscopy without recurrent polyps noted  A female office staff member was present throughout the entire procedure  Impression: History of bladder papilloma with atypia, cystoscopy without evidence of recurrence    Plan: I recommend obtaining a renal bladder ultrasound  Assuming this study is within normal limits follow-up can be in 1 year with 1 additional repeat cystoscopy surveillance cystoscopy  She was instructed to call sooner if she has gross hematuria

## 2023-03-20 ENCOUNTER — HOSPITAL ENCOUNTER (OUTPATIENT)
Dept: RADIOLOGY | Facility: HOSPITAL | Age: 68
Discharge: HOME | End: 2023-03-20
Attending: UROLOGY

## 2023-03-20 DIAGNOSIS — D41.4 BLADDER POLYP: ICD-10-CM

## 2023-04-06 ENCOUNTER — TELEPHONE (OUTPATIENT)
Dept: UROLOGY | Facility: CLINIC | Age: 68
End: 2023-04-06

## 2023-04-06 NOTE — TELEPHONE ENCOUNTER
Aakash Johnson had a recent cystoscopy after history of a papilloma  Cystoscopy was negative  Surveillance upper tract imaging with a renal bladder ultrasound showed a questionable subcentimeter nodule on the right lateral wall  There was no cystoscopic evidence of a mass  Follow-up in 1 year recommended

## 2023-04-07 NOTE — TELEPHONE ENCOUNTER
Pt called and left VM stating she got US results and requesting c/b    Pt call [phone]

## 2023-04-10 NOTE — TELEPHONE ENCOUNTER
Contacted and spoke with the patient and reassured her the nodule seen on US was not seen on cytoscopic exam   Dr Edi Tapia  reviewed the finding and his recommendation is to follow up in one year  Patient verbalized understanding

## 2023-04-24 ENCOUNTER — OFFICE VISIT (OUTPATIENT)
Dept: PULMONOLOGY | Facility: MEDICAL CENTER | Age: 68
End: 2023-04-24

## 2023-04-24 VITALS
BODY MASS INDEX: 31.65 KG/M2 | DIASTOLIC BLOOD PRESSURE: 68 MMHG | HEIGHT: 59 IN | TEMPERATURE: 98.6 F | SYSTOLIC BLOOD PRESSURE: 118 MMHG | HEART RATE: 67 BPM | RESPIRATION RATE: 12 BRPM | WEIGHT: 157 LBS | OXYGEN SATURATION: 97 %

## 2023-04-24 DIAGNOSIS — G47.33 OBSTRUCTIVE SLEEP APNEA: Primary | ICD-10-CM

## 2023-04-24 RX ORDER — MULTIVIT WITH MINERALS/LUTEIN
250 TABLET ORAL DAILY
COMMUNITY
End: 2023-05-02

## 2023-04-24 NOTE — PROGRESS NOTES
Pulmonary Follow Up Note   Oxana Rodriguez 74 y o  female MRN: [id number]  4/24/2023      Assessment/Plan:     Obstructive sleep apnea  Patient had a diagnostic sleep study completed 12/15/2018 and was found to have mild obstructive sleep apnea that is moderate during REM sleep  She had a total of 53 apneas and hypopneas for an index of 7 1 that increases to 22 9 with REM sleep  She is maintained on a DreamStation 2 auto CPAP  Her minimum pressure is set to 5 cmH2O and her maximum pressure is set to 20 cmH2O  I reviewed her compliance report from 3/22/2023 through 4/20/2023 and the patient has been compliant for all 30 days with 100% compliance  Her average usage is 5 hours 38 minutes and 36 seconds  The percent of days that she uses it greater than 4 hours is 90 0%  She has a mean pressure of 6 1 cmH2O  She feels well rested most of the time, except when the machine falls off of her face when she sleeps at night  Patient is inquiring about the inspire device at today's visit  I will send a referral to sleep medicine as the patient does meet criteria  Visit orders:    Problem List Items Addressed This Visit        Respiratory    Obstructive sleep apnea - Primary     Patient had a diagnostic sleep study completed 12/15/2018 and was found to have mild obstructive sleep apnea that is moderate during REM sleep  She had a total of 53 apneas and hypopneas for an index of 7 1 that increases to 22 9 with REM sleep  She is maintained on a DreamStation 2 auto CPAP  Her minimum pressure is set to 5 cmH2O and her maximum pressure is set to 20 cmH2O  I reviewed her compliance report from 3/22/2023 through 4/20/2023 and the patient has been compliant for all 30 days with 100% compliance  Her average usage is 5 hours 38 minutes and 36 seconds  The percent of days that she uses it greater than 4 hours is 90 0%  She has a mean pressure of 6 1 cmH2O   She feels well rested most of the time, except when the machine falls off of her face when she sleeps at night  Patient is inquiring about the inspire device at today's visit  I will send a referral to sleep medicine as the patient does meet criteria  Relevant Orders    Ambulatory Referral to Sleep Medicine       Return in about 1 year (around 4/24/2024), or if symptoms worsen or fail to improve  History of Present Illness   HPI:  Jeaneth Lawrence is a 74 y o  female who presents to the office today for obstructive sleep apnea follow up  She has been compliant with her machine and is requesting more information about the [employer] device  She state's that she cannot always tolerate her mask, and she has tried different ones  Review of Systems   Constitutional: Negative for activity change, appetite change, chills, fatigue and fever  HENT: Negative for congestion  Respiratory: Negative for chest tightness and shortness of breath  Cardiovascular: Negative for chest pain  Psychiatric/Behavioral: Negative for sleep disturbance  All other systems reviewed and are negative  Medical, Family and Social history reviewed and updated as appropriate    Historical Information   Past Medical History:   Diagnosis Date   • 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[facility] [address] )    • Brain tumor (benign) ([facility] [address] )    • Colon polyp    • Constipation    • CPAP (continuous positive airway pressure) dependence    • GERD (gastroesophageal reflux disease)    • Hiatal hernia    • History of arteriovenous malformation    • Liver problem     cyst removed from liver   • Sleep apnea      Past Surgical History:   Procedure Laterality Date   • BRAIN SURGERY     • BREAST BIOPSY Right    • COLONOSCOPY     • CRANIOTOMY     • CYSTOSCOPY  03/15/2023    [place]   • EGD     • LIVER SURGERY      adenoma removed from liver   • [place] COLONOSCOPY FLX DX W/COLLJ SPEC WHEN PFRMD N/A 03/20/2017    Procedure: EGD AND COLONOSCOPY;  Surgeon: Homer Dick MD;  Location: [facility];   Service: Gastroenterology • [place] CYSTO W/REMOVAL OF LESIONS SMALL N/A 2019    Procedure: TRANSURETHRAL RESECTION OF BLADDER TUMOR (TURBT);   Surgeon: Pamela Mitchell MD;  [facility];  Service: Urology   • [place] CYSTOURETHROSCOPY WITH BIOPSY N/A 2022    Procedure: CYSTOSCOPY WITH BLADDER BIOPSY, FULGURATION;  Surgeon: John Garcia MD;  [facility];  Service: Urology   • TUBAL LIGATION       Family History   Problem Relation Age of Onset   • Cancer Mother 79        lung cancer   • Lung cancer Mother    • Cancer Father 45        brain cancer   • Brain cancer Father    • Melanoma Sister    • Lung cancer Brother    • Prostate cancer Brother    • Obesity Daughter    • Diabetes Son    • Obesity Son    • Asthma Son    • Ovarian cancer Maternal Grandmother    • Breast cancer Paternal Grandmother        Social History     Tobacco Use   Smoking Status Former   • Packs/day: 3 00   • Years: 20 00   • Pack years: 60 00   • Types: Cigarettes   • Quit date:    • Years since quittin 3   Smokeless Tobacco Never         Meds/Allergies     Current Outpatient Medications:   •  acetaminophen (TYLENOL) 500 mg tablet, Take 1 tablet by mouth, Disp: , Rfl:   •  ascorbic acid (VITAMIN C) 250 mg tablet, Take 250 mg by mouth daily, Disp: , Rfl:   •  B Complex-C CAPS, Take 1 capsule by mouth daily, Disp: , Rfl:   •  Calcium Carb-Cholecalciferol (OSCAL-D) 500 mg-200 units per tablet, Take 1 tablet by mouth 2 (two) times a day with meals, Disp: , Rfl:   •  co-enzyme Q-10 30 MG capsule, Take 30 mg by mouth 3 (three) times a day, Disp: , Rfl:   •  famotidine-calcium carbonate-magnesium hydroxide (PEPCID COMPLETE) -165 MG CHEW, Chew 1 tablet daily as needed for heartburn, Disp: , Rfl:   •  LORATADINE ALLERGY RELIEF PO, Take by mouth, Disp: , Rfl:   •  magnesium gluconate (MAGONATE) 500 mg tablet, Take 500 mg by mouth once, Disp: , Rfl:   •  Mirabegron ER (Myrbetriq) 50 MG TB24, Take 1 tablet (50 mg total) by mouth daily, "Disp: 90 tablet, Rfl: 3  •  zinc gluconate 50 mg tablet, Take 50 mg by mouth daily, Disp: , Rfl:   •  Ascorbic Acid (VITAMIN C GUMMIES PO), Take by mouth 2 gummies a day (Patient not taking: Reported on 4/24/2023), Disp: , Rfl:   •  estradiol (ESTRACE VAGINAL) 0 1 mg/g vaginal cream, Insert 0 5 g into the vagina 3 (three) times a week (Patient not taking: Reported on 6/29/2022), Disp: 42 5 g, Rfl: 10  No Known Allergies    Vitals: Blood pressure 118/68, pulse 67, temperature 98 6 °F (37 °C), temperature source Temporal, resp  rate 12, height 4' 11\" (1 499 m), weight 71 2 kg (157 lb), SpO2 97 %  Body mass index is 31 71 kg/m²  Oxygen Therapy  SpO2: 97 %      Physical Exam  Constitutional:       General: She is not in acute distress  Appearance: Normal appearance  She is not ill-appearing or toxic-appearing  HENT:      Head: Normocephalic and atraumatic  Nose: Nose normal       Mouth/Throat:      Pharynx: Oropharynx is clear  Eyes:      Conjunctiva/sclera: Conjunctivae normal    Cardiovascular:      Rate and Rhythm: Normal rate and regular rhythm  Heart sounds: Normal heart sounds  Pulmonary:      Effort: Pulmonary effort is normal  No respiratory distress  Breath sounds: Normal breath sounds  No stridor  No wheezing, rhonchi or rales  Chest:      Chest wall: No tenderness  Abdominal:      Palpations: Abdomen is soft  Musculoskeletal:         General: Normal range of motion  Cervical back: Normal range of motion  Skin:     General: Skin is warm and dry  Neurological:      General: No focal deficit present  Mental Status: She is alert and oriented to person, place, and time  Psychiatric:         Mood and Affect: Mood normal          Behavior: Behavior normal          Labs: I have personally reviewed pertinent lab results    Lab Results   Component Value Date    WBC 4 01 (L) 04/01/2022    HGB 12 3 04/01/2022    HCT 38 6 04/01/2022    MCV 89 04/01/2022     04/01/2022 " Lab Results   Component Value Date    GLUCOSE 140 05/09/2015    CALCIUM 9 3 04/01/2022     05/09/2015    K 4 1 04/01/2022    CO2 29 04/01/2022     04/01/2022    BUN 22 04/01/2022    CREATININE 0 85 04/01/2022     No results found for: IGE  Lab Results   Component Value Date    ALT 39 04/01/2022    AST 17 04/01/2022    ALKPHOS 134 (H) 04/01/2022    BILITOT 0 54 05/02/2015       Imaging and other studies: I have personally reviewed pertinent reports  and I have personally reviewed pertinent films in PACS       Other Studies: I have personally reviewed pertinent reports  Diagnostic sleep study 12/15/18: Mild obstructive sleep apnea that is moderate during REM sleep  No evidence of periodic limb movements  Patient had a total of 53 apneas and hypopneas with an index of 7 1, that increases to 22 9 during REM sleep  She had 1 central apnea for an index of 0 1

## 2023-04-24 NOTE — ASSESSMENT & PLAN NOTE
Patient had a diagnostic sleep study completed 12/15/2018 and was found to have mild obstructive sleep apnea that is moderate during REM sleep  She had a total of 53 apneas and hypopneas for an index of 7 1 that increases to 22 9 with REM sleep  She is maintained on a DreamStation 2 auto CPAP  Her minimum pressure is set to 5 cmH2O and her maximum pressure is set to 20 cmH2O  I reviewed her compliance report from 3/22/2023 through 4/20/2023 and the patient has been compliant for all 30 days with 100% compliance  Her average usage is 5 hours 38 minutes and 36 seconds  The percent of days that she uses it greater than 4 hours is 90 0%  She has a mean pressure of 6 1 cmH2O  Here average time in a large leak per day is 4 seconds  Her average AHI is 1 7 She feels well rested most of the time, except when the machine falls off of her face when she sleeps at night  Patient is inquiring about the inspire device at today's visit  I will send a referral to sleep medicine as the patient does meet criteria

## 2023-05-03 ENCOUNTER — ANNUAL EXAM (OUTPATIENT)
Dept: OBGYN CLINIC | Facility: CLINIC | Age: 68
End: 2023-05-03

## 2023-05-03 ENCOUNTER — OFFICE VISIT (OUTPATIENT)
Dept: FAMILY MEDICINE CLINIC | Facility: CLINIC | Age: 68
End: 2023-05-03

## 2023-05-03 VITALS
WEIGHT: 154 LBS | HEIGHT: 59 IN | BODY MASS INDEX: 31.04 KG/M2 | SYSTOLIC BLOOD PRESSURE: 118 MMHG | DIASTOLIC BLOOD PRESSURE: 70 MMHG

## 2023-05-03 VITALS
HEIGHT: 59 IN | SYSTOLIC BLOOD PRESSURE: 122 MMHG | BODY MASS INDEX: 31.61 KG/M2 | OXYGEN SATURATION: 99 % | WEIGHT: 156.8 LBS | DIASTOLIC BLOOD PRESSURE: 80 MMHG | HEART RATE: 65 BPM

## 2023-05-03 DIAGNOSIS — Z87.891 FORMER SMOKER: ICD-10-CM

## 2023-05-03 DIAGNOSIS — N95.2 POSTMENOPAUSAL ATROPHIC VAGINITIS: ICD-10-CM

## 2023-05-03 DIAGNOSIS — Z13.0 SCREENING FOR DEFICIENCY ANEMIA: ICD-10-CM

## 2023-05-03 DIAGNOSIS — C67.2 MALIGNANT NEOPLASM OF LATERAL WALL OF URINARY BLADDER (HCC): ICD-10-CM

## 2023-05-03 DIAGNOSIS — D42.0 ATYPICAL INTRACRANIAL MENINGIOMA (HCC): ICD-10-CM

## 2023-05-03 DIAGNOSIS — Z13.220 SCREENING CHOLESTEROL LEVEL: ICD-10-CM

## 2023-05-03 DIAGNOSIS — M81.6 LOCALIZED OSTEOPOROSIS WITHOUT CURRENT PATHOLOGICAL FRACTURE: ICD-10-CM

## 2023-05-03 DIAGNOSIS — Z12.31 ENCOUNTER FOR SCREENING MAMMOGRAM FOR MALIGNANT NEOPLASM OF BREAST: ICD-10-CM

## 2023-05-03 DIAGNOSIS — H91.90 HEARING LOSS, UNSPECIFIED HEARING LOSS TYPE, UNSPECIFIED LATERALITY: ICD-10-CM

## 2023-05-03 DIAGNOSIS — Z01.419 WOMEN'S ANNUAL ROUTINE GYNECOLOGICAL EXAMINATION: Primary | ICD-10-CM

## 2023-05-03 DIAGNOSIS — Z23 NEED FOR PNEUMOCOCCAL 20-VALENT CONJUGATE VACCINATION: ICD-10-CM

## 2023-05-03 DIAGNOSIS — D03.59 MELANOMA IN SITU OF BACK (HCC): ICD-10-CM

## 2023-05-03 DIAGNOSIS — I10 ESSENTIAL HYPERTENSION: ICD-10-CM

## 2023-05-03 DIAGNOSIS — Z12.2 ENCOUNTER FOR SCREENING FOR LUNG CANCER: ICD-10-CM

## 2023-05-03 DIAGNOSIS — E55.9 VITAMIN D INSUFFICIENCY: ICD-10-CM

## 2023-05-03 DIAGNOSIS — Z11.3 SCREENING FOR STD (SEXUALLY TRANSMITTED DISEASE): ICD-10-CM

## 2023-05-03 DIAGNOSIS — Z00.00 ANNUAL PHYSICAL EXAM: Primary | ICD-10-CM

## 2023-05-03 DIAGNOSIS — K63.5 POLYP OF COLON, UNSPECIFIED PART OF COLON, UNSPECIFIED TYPE: ICD-10-CM

## 2023-05-03 NOTE — PROGRESS NOTES
Assessment/Plan:   Diagnoses and all orders for this visit:    Annual physical exam  - reviewed PMHx, PSHx, meds, allergies, FHx, Soc Hx and Sexual Hx   - due for screening labs - script given   - interested in STD screening - script given   - UTD with Tdap   - UTD with COVID IMMs and Booster  - no Flu vaccine this season   - interested in PCV20 and will get in the office today   - discussed diet and encouraged exercise - see below   - has annual GYN appt scheduled for later today and wants OB to order Mammo  - last Cscope was in 6/2022 with 7yr recall 2/2 h/o colon polyps  - UTD with Dental    - overdue for Optho  - RTO in 1yr for annual exam or sooner if with abnml labs - pt aware and agreeable     Need for pneumococcal 20-valent conjugate vaccination  -     Pneumococcal Conjugate Vaccine 20-valent (Pcv20)    BMI 31 0-31 9,adult  -     TSH, 3rd generation with Free T4 reflex  - BMI 31  - discussed diet and encouraged exercise  - educated that it takes 3500 emily to lose 1lb  - advised to cut down on carbs, 5 servings of fruits/veggies/day, increase water intake (65-80oz/water/day)   - appropriate weight loss goal for women = 0 5-1lb/week  - per the Heart Association - 150mins/exercise/week, but to lose and maintain weight 200-300mins/exercise/week     Essential hypertension  -     Comprehensive metabolic panel; Future  -     Microalbumin / creatinine urine ratio; Future  - BP stable  - her antihypertensives were stopped last year   - cont to monitor and will check labs     Former smoker  -     CT lung screening program; Future  - former smoker (quit in [facility], 2-3PPD/30yrs)  Encounter for screening for lung cancer  -     CT lung screening program; Future    Polyp of colon, unspecified part of colon, unspecified type  - last Cscope was in 6/2022 with 7yr recall 2/2 h/o colon polyps    Localized osteoporosis without current pathological fracture  -     Ambulatory Referral to Rheumatology;  Future    Screening cholesterol level  -     Lipid panel; Future  - The 10-year ASCVD risk score (Stevenson SOLARES, et al , 2019) is: 4 2%    Values used to calculate the score:      Age: 74 years      Sex: Female      Is Non- : No      Diabetic: No      Tobacco smoker: No      Systolic Blood Pressure: 706 mmHg      Is BP treated: No      HDL Cholesterol: 80 mg/dL      Total Cholesterol: 211 mg/dL    Encounter for screening mammogram for malignant neoplasm of breast  - last Mammo 5/2022  - has an appt with Ob/Gyn later today and wants OB to order Mammo  - (+) FHx of Ovarian CA in MGM   - (+) FHx of Breast Ca in PGM     Screening for STD (sexually transmitted disease)  -     : HIV 1/2 AB/AG w Reflex [facility] for 2 yr old and above; Future  -     Chlamydia/GC amplified DNA by PCR; Future  -     RPR-Syphilis Screening (Total Syphilis IGG/IGM); Future  -     Hepatitis panel, acute; Future    Vitamin D insufficiency  -     Vitamin D 25 hydroxy; Future    Screening for deficiency anemia  -     CBC and differential; Future    Hearing loss, unspecified hearing loss type, unspecified laterality  -     Ambulatory Referral to Audiology; Future    Malignant neoplasm of lateral wall of urinary bladder (HCC)  - follows with Uro     Melanoma in situ of back ([address] )  - follows with Derm Q6months     Atypical intracranial meningioma ([facility] [address] )  - s/p craniotomy  - follows with NeuroSurg Q5yrs        Subjective:    Patient ID: Hollie Hancock is a 74 y o  female    Hollie Hancock is a 74 y o  female who presents to the office with her  for an annual exam  - Specialists: Jazmin Officer (has an appt next week - goes Q6months)   - PMHx: GERD, MILEY on CPAP, AVM, meningioma, melanoma in situ of back, adeno of bladder, diverticulosis, colon polyps, memory difficulties, h/o COVID (12/2021)   - allergies: NKDA  - Meds: see med rec   - PSHx: craniotomy, adenoma removed from liver, TURBT, tubal ligation   - FHx: F (brain ca), M (lung ca), Sister (melanoma), Brother (lung "ca), Brother (prostate ca), MGM (ovarian ca), PGM (breast ca)  - Immunizations: Tdap, UTD with COVID IMMs and Booster, declined annual Flu vaccine   - GYN Hx: [address] - has an appt later today   - Hm: Cscope in 6/2022 - 7yr f/u (2029), Mammo 5/2022  - diet/exercise: does not exercise, diet: eggs daily  - social: former smoker (quit in [address] [place] [address], 2-3PPD/30yrs), social EtOH   - sexual Hx: interested in STD screening   - last vision: wears glasses, Dr Perry Mora - overdue   - last dental: Jai Perrin - recent dentures    - ROS: today in the office pt denies F/C/N/V/HA/visual changes/CP/palpitations/SOB/wheezing/cough/abd pain/D/LE edema       The following portions of the patient's history were reviewed and updated as appropriate: allergies, current medications, past family history, past medical history, past social history, past surgical history and problem list     Review of Systems  as per HPI    Objective:  /80   Pulse 65   Ht 4' 11\" (1 499 m)   Wt 71 1 kg (156 lb 12 8 oz)   SpO2 99%   BMI 31 67 kg/m²    Physical Exam  Vitals reviewed  Constitutional:       General: She is not in acute distress  Appearance: Normal appearance  She is not ill-appearing, toxic-appearing or diaphoretic  HENT:      Head: Normocephalic and atraumatic  Right Ear: External ear normal       Left Ear: External ear normal       Nose: Nose normal    Eyes:      General: No scleral icterus  Right eye: No discharge  Left eye: No discharge  Extraocular Movements: Extraocular movements intact  Conjunctiva/sclera: Conjunctivae normal    Cardiovascular:      Rate and Rhythm: Normal rate and regular rhythm  Heart sounds: Normal heart sounds  Pulmonary:      Effort: Pulmonary effort is normal  No respiratory distress  Breath sounds: Normal breath sounds  No stridor  No wheezing, rhonchi or rales  Abdominal:      Palpations: Abdomen is soft     Musculoskeletal:    " General: Normal range of motion  Cervical back: Normal range of motion  Right lower leg: No edema  Left lower leg: No edema  Skin:     General: Skin is warm  Neurological:      General: No focal deficit present  Mental Status: She is alert and oriented to person, place, and time  Psychiatric:         Mood and Affect: Mood normal          Behavior: Behavior normal          BMI Counseling: Body mass index is 31 67 kg/m²  The BMI is above normal  Nutrition recommendations include 3-5 servings of fruits/vegetables daily  Exercise recommendations include exercising 3-5 times per week  Depression Screening Follow-up Plan: Patient's depression screening was positive with a PHQ-2 score of   Their PHQ-9 score was 0  Clinically patient does not have depression  No treatment is required

## 2023-05-03 NOTE — PROGRESS NOTES
ASSESSMENT & PLAN:   Diagnoses and all orders for this visit:    Women's annual routine gynecological examination    Encounter for screening mammogram for malignant neoplasm of breast  -     Mammo screening bilateral w 3d & cad; Future    Postmenopausal atrophic vaginitis          The following were reviewed in today's visit: ASCCP guidelines (Pap screen not indicated after age 72), STD testing breast self exam, mammography screening ordered, use and side effects of HRT, menopause, exercise and healthy diet  Patient to return to office in yearly for annual exam      All questions have been answered to her satisfaction  CC:  Annual Gynecologic Examination  Chief Complaint   Patient presents with    Gynecologic Exam     Pt is here for her annual exam; pap is up to date  She is doing well, no concerns  Pap 22 neg pap/neg HPV  Mammo 22  DXA 22  colonoscopy 22 - repeat 7 years       HPI: Jesus Hercules is a 74 y o  [id number] who presents for annual gynecologic examination  She has the following concerns:        Health Maintenance:    Exercise: active lifestyle  Breast exams/breast awareness: no  Diet: it's ok     Last mammogram:   Colorectal cancer screenin  DEXA:     Past Medical History:   Diagnosis Date    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[address] )     Brain tumor (benign) (HCC)     Colon polyp     Constipation     CPAP (continuous positive airway pressure) dependence     GERD (gastroesophageal reflux disease)     Hiatal hernia     History of arteriovenous malformation     Liver problem     cyst removed from liver    Sleep apnea        Past Surgical History:   Procedure Laterality Date    BRAIN SURGERY      BREAST BIOPSY Right     COLONOSCOPY      CRANIOTOMY      CYSTOSCOPY  03/15/2023    [place]    EGD      LIVER SURGERY      adenoma removed from liver    [place] COLONOSCOPY FLX DX W/COLLJ SPEC WHEN PFRMD N/A 2017    Procedure: EGD AND COLONOSCOPY;  Surgeon: More Minaya Sarah Hodgson MD;  Location: [facility]; Service: Gastroenterology    [place] CYSTO W/REMOVAL OF LESIONS SMALL N/A 2019    Procedure: TRANSURETHRAL RESECTION OF BLADDER TUMOR (TURBT); Surgeon: Arthur Rankin MD;  [facility];  Service: Urology    NC CYSTOURETHROSCOPY WITH BIOPSY N/A 2022    Procedure: CYSTOSCOPY WITH BLADDER BIOPSY, FULGURATION;  Surgeon: Britany Ochoa MD;  [facility];  Service: Urology    TUBAL LIGATION         Past OB/Gyn History:   No LMP recorded  Patient is postmenopausal     Patient is menopausal    Last Pap:  : no abnormalities; further pap screening not indicated  History of abnormal Pap smear: yes - HPV +     Patient is currently sexually active       Family History  Family History   Problem Relation Age of Onset    Cancer Mother 79        lung cancer    Lung cancer Mother     Cancer Father 45        brain cancer    Brain cancer Father     Melanoma Sister     Lung cancer Brother     Prostate cancer Brother     Obesity Daughter     Diabetes Son     Obesity Son     Asthma Son     Ovarian cancer Maternal Grandmother     Breast cancer Paternal Grandmother        Family history of uterine or ovarian cancer: yes  Family history of breast cancer: yes  Family history of colon cancer: no    Social History:  Social History     Socioeconomic History    Marital status: /Civil Union     Spouse name: Not on file    Number of children: Not on file    Years of education: Not on file    Highest education level: Not on file   Occupational History    Not on file   Tobacco Use    Smoking status: Former     Packs/day: 3 00     Years: 25 00     Pack years: 75 00     Types: Cigarettes     Quit date:      Years since quittin 3    Smokeless tobacco: Never   Vaping Use    Vaping Use: Never used   Substance and Sexual Activity    Alcohol use: Yes     Comment: occasional    Drug use: No    Sexual activity: Yes     Partners: Male     Birth control/protection: Post-menopausal, Female Sterilization   Other Topics Concern    Not on file   Social History Narrative    Not on file     Social Determinants of Health     Financial Resource Strain: Not on file   Food Insecurity: Not on file   Transportation Needs: Not on file   Physical Activity: Not on file   Stress: Not on file   Social Connections: Not on file   Intimate Partner Violence: Not on file   Housing Stability: Not on file     Domestic violence screen: negative    Allergies:  No Known Allergies    Medications:    Current Outpatient Medications:     acetaminophen (TYLENOL) 500 mg tablet, Take 1 tablet by mouth, Disp: , Rfl:     B Complex-C CAPS, Take 1 capsule by mouth daily, Disp: , Rfl:     Calcium Carb-Cholecalciferol (OSCAL-D) 500 mg-200 units per tablet, Take 1 tablet by mouth 2 (two) times a day with meals, Disp: , Rfl:     co-enzyme Q-10 30 MG capsule, Take 30 mg by mouth 3 (three) times a day, Disp: , Rfl:     estradiol (ESTRACE VAGINAL) 0 1 mg/g vaginal cream, Insert 0 5 g into the vagina 3 (three) times a week, Disp: 42 5 g, Rfl: 10    famotidine-calcium carbonate-magnesium hydroxide (PEPCID COMPLETE) -165 MG CHEW, Chew 1 tablet daily as needed for heartburn, Disp: , Rfl:     LORATADINE ALLERGY RELIEF PO, Take by mouth, Disp: , Rfl:     magnesium gluconate (MAGONATE) 500 mg tablet, Take 500 mg by mouth once, Disp: , Rfl:     Mirabegron ER (Myrbetriq) 50 MG TB24, Take 1 tablet (50 mg total) by mouth daily, Disp: 90 tablet, Rfl: 3    zinc gluconate 50 mg tablet, Take 50 mg by mouth daily, Disp: , Rfl:     Review of Systems:  Review of Systems   Constitutional: Negative for chills and fever  Respiratory: Negative for cough and shortness of breath  Cardiovascular: Negative for chest pain and palpitations  Gastrointestinal: Positive for constipation and nausea (when she takes calcium)  Negative for abdominal distention, abdominal pain, blood in stool and vomiting  "  Genitourinary: Positive for urgency  Negative for difficulty urinating, dyspareunia, dysuria, frequency, pelvic pain, vaginal bleeding, vaginal discharge and vaginal pain  Neurological: Negative for headaches  Physical Exam:  /70   Ht 4' 11\" (1 499 m)   Wt 69 9 kg (154 lb)   BMI 31 10 kg/m²    Physical Exam  Constitutional:       General: She is awake  Appearance: Normal appearance  She is well-developed  Genitourinary:      Vulva, bladder and urethral meatus normal       Right Labia: No rash, tenderness or lesions  Left Labia: No tenderness, lesions or rash  No labial fusion noted  No vaginal discharge, erythema, tenderness or bleeding  No vaginal prolapse present  No vaginal atrophy present  Right Adnexa: not tender, not full and no mass present  Left Adnexa: not tender, not full and no mass present  Cervix is parous  No cervical motion tenderness, discharge, lesion or polyp  Uterus is not enlarged, tender or irregular  No uterine mass detected  No urethral prolapse present  Bladder is not tender  Pelvic exam was performed with patient in the lithotomy position  Breasts:     Right: No inverted nipple, mass, nipple discharge, skin change or tenderness  Left: No inverted nipple, mass, nipple discharge, skin change or tenderness  HENT:      Head: Normocephalic and atraumatic  Cardiovascular:      Rate and Rhythm: Normal rate and regular rhythm  Heart sounds: Normal heart sounds  Pulmonary:      Effort: Pulmonary effort is normal  No tachypnea or respiratory distress  Breath sounds: Normal breath sounds  Abdominal:      General: Abdomen is flat  There is no distension  Palpations: Abdomen is soft  Tenderness: There is no abdominal tenderness  There is no guarding or rebound  Musculoskeletal:      Cervical back: Neck supple     Lymphadenopathy:      Upper Body:      Right upper body: No " supraclavicular or axillary adenopathy  Left upper body: No supraclavicular or axillary adenopathy  Neurological:      General: No focal deficit present  Mental Status: She is alert and oriented to person, place, and time  Psychiatric:         Mood and Affect: Mood normal          Behavior: Behavior normal          Thought Content: Thought content normal          Judgment: Judgment normal    Vitals reviewed

## 2023-05-07 LAB
25(OH)D3+25(OH)D2 SERPL-MCNC: 87 NG/ML (ref 30–100)
ALBUMIN SERPL-MCNC: 4.8 G/DL (ref 3.8–4.8)
ALBUMIN/CREAT UR: <14 MG/G CREAT (ref 0–29)
ALBUMIN/GLOB SERPL: 2.2 {RATIO} (ref 1.2–2.2)
ALP SERPL-CCNC: 151 IU/L (ref 44–121)
ALT SERPL-CCNC: 28 IU/L (ref 0–32)
AST SERPL-CCNC: 23 IU/L (ref 0–40)
BASOPHILS # BLD AUTO: 0 X10E3/UL (ref 0–0.2)
BASOPHILS NFR BLD AUTO: 1 %
BILIRUB SERPL-MCNC: 0.3 MG/DL (ref 0–1.2)
BUN SERPL-MCNC: 17 MG/DL (ref 8–27)
BUN/CREAT SERPL: 19 (ref 12–28)
C TRACH RRNA SPEC QL NAA+PROBE: NEGATIVE
CALCIUM SERPL-MCNC: 9.7 MG/DL (ref 8.7–10.3)
CHLORIDE SERPL-SCNC: 103 MMOL/L (ref 96–106)
CHOLEST SERPL-MCNC: 215 MG/DL (ref 100–199)
CO2 SERPL-SCNC: 26 MMOL/L (ref 20–29)
CREAT SERPL-MCNC: 0.89 MG/DL (ref 0.57–1)
CREAT UR-MCNC: 21.9 MG/DL
EGFR: 72 ML/MIN/1.73
EOSINOPHIL # BLD AUTO: 0.1 X10E3/UL (ref 0–0.4)
EOSINOPHIL NFR BLD AUTO: 3 %
ERYTHROCYTE [DISTWIDTH] IN BLOOD BY AUTOMATED COUNT: 13.9 % (ref 11.7–15.4)
GLOBULIN SER-MCNC: 2.2 G/DL (ref 1.5–4.5)
GLUCOSE SERPL-MCNC: 87 MG/DL (ref 70–99)
HAV AB SER QL IA: NEGATIVE
HBV CORE AB SERPL QL IA: NEGATIVE
HBV SURFACE AB SER QL: NON REACTIVE
HBV SURFACE AG SERPL QL IA: NEGATIVE
HCT VFR BLD AUTO: 37.8 % (ref 34–46.6)
HCV AB S/CO SERPL IA: NON REACTIVE
HDLC SERPL-MCNC: 96 MG/DL
HGB BLD-MCNC: 12.8 G/DL (ref 11.1–15.9)
HIV 1+2 AB+HIV1 P24 AG SERPL QL IA: NON REACTIVE
IMM GRANULOCYTES # BLD: 0 X10E3/UL (ref 0–0.1)
IMM GRANULOCYTES NFR BLD: 0 %
INTERPRETATION: NORMAL
LDLC SERPL CALC-MCNC: 111 MG/DL (ref 0–99)
LYMPHOCYTES # BLD AUTO: 1.8 X10E3/UL (ref 0.7–3.1)
LYMPHOCYTES NFR BLD AUTO: 41 %
MCH RBC QN AUTO: 28.7 PG (ref 26.6–33)
MCHC RBC AUTO-ENTMCNC: 33.9 G/DL (ref 31.5–35.7)
MCV RBC AUTO: 85 FL (ref 79–97)
MICROALBUMIN UR-MCNC: <3 UG/ML
MONOCYTES # BLD AUTO: 0.3 X10E3/UL (ref 0.1–0.9)
MONOCYTES NFR BLD AUTO: 8 %
N GONORRHOEA RRNA SPEC QL NAA+PROBE: NEGATIVE
NEUTROPHILS # BLD AUTO: 2.1 X10E3/UL (ref 1.4–7)
NEUTROPHILS NFR BLD AUTO: 47 %
PLATELET # BLD AUTO: 164 X10E3/UL (ref 150–450)
POTASSIUM SERPL-SCNC: 4.2 MMOL/L (ref 3.5–5.2)
PROT SERPL-MCNC: 7 G/DL (ref 6–8.5)
RBC # BLD AUTO: 4.46 X10E6/UL (ref 3.77–5.28)
RFX TO HBC IGM: NORMAL
RPR SER QL: NON REACTIVE
SL AMB INTERPRETATION: NORMAL
SL AMB VLDL CHOLESTEROL CALC: 8 MG/DL (ref 5–40)
SODIUM SERPL-SCNC: 142 MMOL/L (ref 134–144)
TRIGL SERPL-MCNC: 47 MG/DL (ref 0–149)
TSH SERPL DL<=0.005 MIU/L-ACNC: 1.07 UIU/ML (ref 0.45–4.5)
WBC # BLD AUTO: 4.3 X10E3/UL (ref 3.4–10.8)

## 2023-05-10 ENCOUNTER — TELEPHONE (OUTPATIENT)
Dept: FAMILY MEDICINE CLINIC | Facility: CLINIC | Age: 68
End: 2023-05-10

## 2023-05-10 NOTE — TELEPHONE ENCOUNTER
----- Message from Malcolm Julien, DO sent at 5/10/2023  3:07 PM EDT -----  Nml labs but pt does not have HepB immunity - advised HepB vaccine series

## 2023-10-16 ENCOUNTER — TELEPHONE (OUTPATIENT)
Dept: RHEUMATOLOGY | Facility: CLINIC | Age: 68
End: 2023-10-16

## 2023-10-16 ENCOUNTER — CONSULT (OUTPATIENT)
Dept: RHEUMATOLOGY | Facility: CLINIC | Age: 68
End: 2023-10-16
Payer: COMMERCIAL

## 2023-10-16 VITALS
WEIGHT: 158 LBS | BODY MASS INDEX: 31.85 KG/M2 | DIASTOLIC BLOOD PRESSURE: 76 MMHG | HEIGHT: 59 IN | SYSTOLIC BLOOD PRESSURE: 116 MMHG

## 2023-10-16 DIAGNOSIS — K21.9 GASTROESOPHAGEAL REFLUX DISEASE WITHOUT ESOPHAGITIS: ICD-10-CM

## 2023-10-16 DIAGNOSIS — M81.0 AGE-RELATED OSTEOPOROSIS WITHOUT CURRENT PATHOLOGICAL FRACTURE: Primary | ICD-10-CM

## 2023-10-16 DIAGNOSIS — Z79.83 LONG TERM USE OF BISPHOSPHONATES: ICD-10-CM

## 2023-10-16 PROCEDURE — 99204 OFFICE O/P NEW MOD 45 MIN: CPT | Performed by: INTERNAL MEDICINE

## 2023-10-16 NOTE — PATIENT INSTRUCTIONS
Check your dose of Vitamin D - if it is 2406-2233 IU 2 tablets daily that is fine. Aim for a total calcium dose of 1357-0833 mg daily.

## 2023-10-16 NOTE — PROGRESS NOTES
Assessment and Plan:   Ms. Eugene Reynaga is a 82-year-old female with history significant for postmenopausal osteoporosis and GERD who presents for an evaluation and treatment. She is referred by Dr. Dilan Floyd for a rheumatology consult. Montserrat Corrales presents today for an evaluation and management of postmenopausal osteoporosis that was initially detected on a DEXA scan in 2018. She has not been started on treatment thus far. In view of the significant T-scores I recommend initiating antiresorptive treatment and as an initial option I will plan to start her on IV zoledronic acid infusions on an annual basis. Oral bisphosphonates will be avoided due to her history of GERD. I reviewed the side effects with her including but not limited to risk for arthralgias, electrolyte abnormalities, osteonecrosis of the jaw and atypical femoral fractures. I encouraged her to aim for a total calcium intake of 1000 to 1200 mg daily and she will check how much vitamin D she is taking but the same dose can be maintained as her recent vitamin D levels were normal.  I encouraged her to perform weightbearing exercises. A DEXA scan can be repeated every 1 to 2 years after she starts treatment to monitor for an improvement. Plan:  Diagnoses and all orders for this visit:    Age-related osteoporosis without current pathological fracture  -     Protein electrophoresis, serum; Future  -     PTH, intact; Future  -     Ambulatory Referral to Rheumatology    Long term use of bisphosphonates    Gastroesophageal reflux disease without esophagitis      I have personally reviewed pertinent films in PACS of the DEXA scan which shows osteoporosis. Activities as tolerated. Exercise: try to maintain a low impact exercise regimen as much as possible. Walk for 30 minutes a day for at least 3 days a week. Continue other medications as prescribed by PCP and other specialists. RTC in 1 year. HPI  Ms. Eugene Reynaga is a 82-year-old female with history significant for postmenopausal osteoporosis and GERD who presents for an evaluation and treatment. She is referred by Dr. Dewayne Bermeo for a rheumatology consult. Patient reports based on the DEXA scan done in June 2022 she was informed of the diagnosis of osteoporosis based on the lumbar spine. A DEXA scan done prior to this in 2018 also showed osteoporosis but she was unaware of the results and thought she had osteopenia. The recent study showed a 5% decrease in the total bone mineral density of the lumbar spine with no significant change of the bilateral hips. The 10-year risk of hip fracture was 2% with a 10-year risk of major osteoporotic fracture of 21%. She has not been started on medications yet. She does take calcium supplements 600 mg once daily and vitamin D 2 tablets daily but she is unsure of the dose. No specific weightbearing exercises that she performs. She attained menopause in her early 45s. She smoked from the ages of 17 till 38+. She drinks alcohol socially. No long-term steroid use. No history of an inflammatory arthritis, chronic kidney disease or thyroid disease. She reports a history of osteoporosis in her sister and although her mother was not formally diagnosed she had bilateral hip fractures. The following portions of the patient's history were reviewed and updated as appropriate: allergies, current medications, past family history, past medical history, past social history, past surgical history and problem list.      Review of Systems  Constitutional: Negative for weight change, fevers, chills, night sweats, fatigue. ENT/Mouth: Negative for hearing changes, ear pain, nasal congestion, sinus pain, hoarseness, sore throat, rhinorrhea, swallowing difficulty. Eyes: Negative for pain, redness, discharge, vision changes. Cardiovascular: Negative for chest pain, SOB, palpitations. Respiratory: Negative for cough, sputum, wheezing, dyspnea.    Gastrointestinal: Negative for nausea, vomiting, diarrhea, constipation, pain, heartburn. Genitourinary: Negative for dysuria, urinary frequency, hematuria. Musculoskeletal: As per HPI. Skin: Negative for skin rash, color changes. Neuro: Negative for weakness, numbness, tingling, loss of consciousness. Psych: Negative for anxiety, depression. Heme/Lymph: Negative for easy bruising, bleeding, lymphadenopathy. Past Medical History:   Diagnosis Date    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[address])     Brain tumor (benign) (HCC)     Colon polyp     Constipation     CPAP (continuous positive airway pressure) dependence     GERD (gastroesophageal reflux disease)     Hiatal hernia     History of arteriovenous malformation     Liver problem     cyst removed from liver    Sleep apnea        Past Surgical History:   Procedure Laterality Date    BRAIN SURGERY      BREAST BIOPSY Right     COLONOSCOPY      CRANIOTOMY      CYSTOSCOPY  03/15/2023    [facility]    EGD      LIVER SURGERY      adenoma removed from liver    [place] COLONOSCOPY FLX DX W/COLLJ SPEC WHEN PFRMD N/A 03/20/2017    Procedure: EGD AND COLONOSCOPY;  Surgeon: Iliana Gordon MD;  Location: [facility]; Service: Gastroenterology    MN CYSTO W/REMOVAL OF LESIONS SMALL N/A 12/04/2019    Procedure: TRANSURETHRAL RESECTION OF BLADDER TUMOR (TURBT);   Surgeon: Cindy Alvarado MD;  [facility];  Service: Urology    MN CYSTOURETHROSCOPY WITH BIOPSY N/A 08/30/2022    Procedure: CYSTOSCOPY WITH BLADDER BIOPSY, FULGURATION;  Surgeon: Cal Camp MD;  [facility];  Service: Urology    TUBAL LIGATION         Social History     Socioeconomic History    Marital status: /Civil Union     Spouse name: Not on file    Number of children: Not on file    Years of education: Not on file    Highest education level: Not on file   Occupational History    Not on file   Tobacco Use    Smoking status: Former     Packs/day: 3.00     Years: 25.00     Total pack years: 75.00 Types: Cigarettes     Quit date: 38     Years since quittin.8    Smokeless tobacco: Never   Vaping Use    Vaping Use: Never used   Substance and Sexual Activity    Alcohol use: Yes     Comment: occasional    Drug use: No    Sexual activity: Yes     Partners: Male     Birth control/protection: Post-menopausal, Female Sterilization   Other Topics Concern    Not on file   Social History Narrative    Not on file     Social Determinants of Health     Financial Resource Strain: Not on file   Food Insecurity: Not on file   Transportation Needs: Not on file   Physical Activity: Not on file   Stress: Not on file   Social Connections: Not on file   Intimate Partner Violence: Not on file   Housing Stability: Not on file       Family History   Problem Relation Age of Onset    Cancer Mother 79        lung cancer    Lung cancer Mother     Cancer Father 45        brain cancer    Brain cancer Father     Melanoma Sister     Lung cancer Brother     Prostate cancer Brother     Obesity Daughter     Diabetes Son     Obesity Son     Asthma Son     Ovarian cancer Maternal Grandmother     Breast cancer Paternal Grandmother        No Known Allergies      Current Outpatient Medications:     acetaminophen (TYLENOL) 500 mg tablet, Take 1 tablet by mouth, Disp: , Rfl:     B Complex-C CAPS, Take 1 capsule by mouth daily, Disp: , Rfl:     Calcium Carb-Cholecalciferol (OSCAL-D) 500 mg-200 units per tablet, Take 1 tablet by mouth 2 (two) times a day with meals, Disp: , Rfl:     co-enzyme Q-10 30 MG capsule, Take 30 mg by mouth 3 (three) times a day, Disp: , Rfl:     estradiol (ESTRACE VAGINAL) 0.1 mg/g vaginal cream, Insert 0.5 g into the vagina 3 (three) times a week, Disp: 42.5 g, Rfl: 10    famotidine-calcium carbonate-magnesium hydroxide (PEPCID COMPLETE) -165 MG CHEW, Chew 1 tablet daily as needed for heartburn, Disp: , Rfl:     LORATADINE ALLERGY RELIEF PO, Take by mouth, Disp: , Rfl:     magnesium gluconate (MAGONATE) 500 mg tablet, Take 500 mg by mouth once, Disp: , Rfl:     Mirabegron ER (Myrbetriq) 50 MG TB24, Take 1 tablet (50 mg total) by mouth daily, Disp: 90 tablet, Rfl: 3    zinc gluconate 50 mg tablet, Take 50 mg by mouth daily, Disp: , Rfl:       Objective:    Vitals:    10/16/23 0806   BP: 116/76   Weight: 71.7 kg (158 lb)   Height: 4' 11" (1.499 m)       Physical Exam  General: Well appearing, well nourished, in no distress. Oriented x 3, normal mood and affect. Ambulating without difficulty. Skin: Good turgor, no rash, unusual bruising or prominent lesions. Hair: Normal texture and distribution. Nails: Normal color, no deformities. HEENT:  Head: Normocephalic, atraumatic. Eyes: Conjunctiva clear, sclera non-icteric, EOM intact. Extremities: No amputations or deformities, cyanosis, edema. Neurologic: Alert and oriented. No focal neurological deficits appreciated. Psychiatric: Normal mood and affect. Izzy Vang M.D.   Rheumatology

## 2023-10-16 NOTE — TELEPHONE ENCOUNTER
Please start prior authorization for annual Reclast infusions for osteoporosis to be set up at the [facility]. Avoiding oral bisphosphonates due to a history of GERD.

## 2023-10-19 PROBLEM — M81.0 POSTMENOPAUSAL OSTEOPOROSIS: Status: ACTIVE | Noted: 2023-10-19

## 2023-10-19 NOTE — TELEPHONE ENCOUNTER
Called insurance at [phone] and spoke with [place] . No Marvene Gosselin is required for Reclast . However starting November 1st, the plan is moving to [facility] (#[phone]). She is unsure if auth requirements will change. But up until 10/31 no auth is needed.      Reference #: [id number]

## 2023-10-20 NOTE — TELEPHONE ENCOUNTER
Could not get through to [facility] to schedule appointment 10/20, will attempt again Monday 10/23

## 2023-10-30 ENCOUNTER — HOSPITAL ENCOUNTER (OUTPATIENT)
Dept: INFUSION CENTER | Facility: HOSPITAL | Age: 68
Discharge: HOME | End: 2023-10-30

## 2023-11-06 ENCOUNTER — LAB (OUTPATIENT)
Dept: LAB | Facility: HOSPITAL | Age: 68
End: 2023-11-06
Attending: INTERNAL MEDICINE
Payer: COMMERCIAL

## 2023-11-06 DIAGNOSIS — M81.0 AGE-RELATED OSTEOPOROSIS WITHOUT CURRENT PATHOLOGICAL FRACTURE: ICD-10-CM

## 2023-11-06 DIAGNOSIS — M81.0 POSTMENOPAUSAL OSTEOPOROSIS: ICD-10-CM

## 2023-11-06 LAB
ALBUMIN SERPL BCP-MCNC: 4.2 G/DL (ref 3.5–5)
ALP SERPL-CCNC: 134 U/L (ref 34–104)
ALT SERPL W P-5'-P-CCNC: 36 U/L (ref 7–52)
ANION GAP SERPL CALCULATED.3IONS-SCNC: 5 MMOL/L
AST SERPL W P-5'-P-CCNC: 28 U/L (ref 13–39)
BILIRUB SERPL-MCNC: 0.31 MG/DL (ref 0.2–1)
BUN SERPL-MCNC: 18 MG/DL (ref 5–25)
CALCIUM SERPL-MCNC: 9.2 MG/DL (ref 8.4–10.2)
CHLORIDE SERPL-SCNC: 105 MMOL/L (ref 96–108)
CO2 SERPL-SCNC: 28 MMOL/L (ref 21–32)
CREAT SERPL-MCNC: 0.76 MG/DL (ref 0.6–1.3)
GFR SERPL CREATININE-BSD FRML MDRD: 82 ML/MIN/1.73SQ M
GLUCOSE SERPL-MCNC: 87 MG/DL (ref 65–140)
POTASSIUM SERPL-SCNC: 4 MMOL/L (ref 3.5–5.3)
PROT SERPL-MCNC: 6.9 G/DL (ref 6.4–8.4)
PTH-INTACT SERPL-MCNC: 64.8 PG/ML (ref 12–88)
SODIUM SERPL-SCNC: 138 MMOL/L (ref 135–147)

## 2023-11-06 PROCEDURE — 36415 COLL VENOUS BLD VENIPUNCTURE: CPT

## 2023-11-06 PROCEDURE — 84165 PROTEIN E-PHORESIS SERUM: CPT

## 2023-11-06 PROCEDURE — 83970 ASSAY OF PARATHORMONE: CPT

## 2023-11-06 PROCEDURE — 86334 IMMUNOFIX E-PHORESIS SERUM: CPT

## 2023-11-06 PROCEDURE — 80053 COMPREHEN METABOLIC PANEL: CPT

## 2023-11-08 LAB
ALBUMIN SERPL ELPH-MCNC: 3.93 G/DL (ref 3.2–5.1)
ALBUMIN SERPL ELPH-MCNC: 61.4 % (ref 48–70)
ALPHA1 GLOB SERPL ELPH-MCNC: 0.31 G/DL (ref 0.15–0.47)
ALPHA1 GLOB SERPL ELPH-MCNC: 4.8 % (ref 1.8–7)
ALPHA2 GLOB SERPL ELPH-MCNC: 0.74 G/DL (ref 0.42–1.04)
ALPHA2 GLOB SERPL ELPH-MCNC: 11.6 % (ref 5.9–14.9)
BETA GLOB ABNORMAL SERPL ELPH-MCNC: 0.45 G/DL (ref 0.31–0.57)
BETA1 GLOB SERPL ELPH-MCNC: 7.1 % (ref 4.7–7.7)
BETA2 GLOB SERPL ELPH-MCNC: 4.1 % (ref 3.1–7.9)
BETA2+GAMMA GLOB SERPL ELPH-MCNC: 0.26 G/DL (ref 0.2–0.58)
GAMMA GLOB ABNORMAL SERPL ELPH-MCNC: 0.7 G/DL (ref 0.4–1.66)
GAMMA GLOB SERPL ELPH-MCNC: 11 % (ref 6.9–22.3)
IGG/ALB SER: 1.59 {RATIO} (ref 1.1–1.8)
INTERPRETATION UR IFE-IMP: NORMAL
PROT PATTERN SERPL ELPH-IMP: NORMAL
PROT SERPL-MCNC: 6.4 G/DL (ref 6.4–8.2)

## 2023-11-08 PROCEDURE — 84165 PROTEIN E-PHORESIS SERUM: CPT | Performed by: STUDENT IN AN ORGANIZED HEALTH CARE EDUCATION/TRAINING PROGRAM

## 2023-11-08 PROCEDURE — 86334 IMMUNOFIX E-PHORESIS SERUM: CPT | Performed by: STUDENT IN AN ORGANIZED HEALTH CARE EDUCATION/TRAINING PROGRAM

## 2024-02-21 PROBLEM — Z01.419 ENCOUNTER FOR GYNECOLOGICAL EXAMINATION WITHOUT ABNORMAL FINDING: Status: RESOLVED | Noted: 2018-08-20 | Resolved: 2024-02-21

## 2024-03-29 DIAGNOSIS — R35.0 URINARY FREQUENCY: ICD-10-CM

## 2024-03-29 RX ORDER — MIRABEGRON 50 MG/1
1 TABLET, FILM COATED, EXTENDED RELEASE ORAL DAILY
Qty: 90 TABLET | Refills: 1 | Status: SHIPPED | OUTPATIENT
Start: 2024-03-29

## 2024-04-10 ENCOUNTER — OFFICE VISIT (OUTPATIENT)
Dept: PULMONOLOGY | Facility: MEDICAL CENTER | Age: 69
End: 2024-04-10
Payer: COMMERCIAL

## 2024-04-10 ENCOUNTER — HOSPITAL ENCOUNTER (OUTPATIENT)
Dept: RADIOLOGY | Facility: HOSPITAL | Age: 69
Discharge: HOME | End: 2024-04-10
Payer: COMMERCIAL

## 2024-04-10 VITALS
BODY MASS INDEX: 32.82 KG/M2 | HEART RATE: 76 BPM | TEMPERATURE: 97.8 F | RESPIRATION RATE: 12 BRPM | WEIGHT: 162.8 LBS | OXYGEN SATURATION: 98 % | HEIGHT: 59 IN | SYSTOLIC BLOOD PRESSURE: 138 MMHG | DIASTOLIC BLOOD PRESSURE: 86 MMHG

## 2024-04-10 DIAGNOSIS — R05.3 CHRONIC COUGH: ICD-10-CM

## 2024-04-10 DIAGNOSIS — F51.01 PRIMARY INSOMNIA: ICD-10-CM

## 2024-04-10 DIAGNOSIS — G47.33 OBSTRUCTIVE SLEEP APNEA: Chronic | ICD-10-CM

## 2024-04-10 DIAGNOSIS — Z87.891 FORMER SMOKER: Primary | Chronic | ICD-10-CM

## 2024-04-10 PROCEDURE — 99214 OFFICE O/P EST MOD 30 MIN: CPT | Performed by: NURSE PRACTITIONER

## 2024-04-10 PROCEDURE — 71046 X-RAY EXAM CHEST 2 VIEWS: CPT

## 2024-04-10 NOTE — ASSESSMENT & PLAN NOTE
The patient she is a former smoker however she stopped smoking at least 25 years ago.  Not eligible for CT screening of chest.  Basically she asymptomatic but I will order a chest x-ray

## 2024-04-10 NOTE — ASSESSMENT & PLAN NOTE
Patient has a history of mild obstructive sleep apnea.  She does use and benefit from her current CPAP.  Her compliance data is revealed from March 2, 2024 to April 2, 2024 patient is compliant with average usage of 5 hours and 45 minutes this is on auto CPAP 5 to 20 cm of water pressure.  Her device pressure 90% of the time is 8.  And AHI is reduced to 1.8 events.  All she is doing well she does have some fatigue during the day this is likely multifactorial she has 0 leak and I did invite her for possible evaluation for mask change she will consider this

## 2024-04-10 NOTE — ASSESSMENT & PLAN NOTE
Patient has a history of mild obstructive sleep apnea.  However I believe this diagnostic test was done in 2018 and although it is viewed is 5.8 events per hour if I believe it was likely higher.  However patient does have a few medical conditions she does have urinary frequency during the nighttime she does struggle with daytime hypersomnolence and spite of using and benefiting from auto CPAP for which AHI is reduced to 1.8.  She would benefit from seeing a specialist for her primary insomnia patient is agreeable to this plan of care

## 2024-04-10 NOTE — PATIENT INSTRUCTIONS
Sleep Apnea   AMBULATORY CARE:   Sleep apnea  is a condition that causes you to stop breathing often during sleep.  Types of sleep apnea:   Obstructive sleep apnea (MILEY)  is the most common kind. The muscles and tissues around your throat relax and block air from passing through. Obesity, use of alcohol or cigarettes, or a family history are common causes. MILEY may increase your risk for complications after surgery.         Central sleep apnea (CSA)  means your brain does not send signals to the muscles that control breathing. You do not take a breath even though your airway is open. Common causes include medical conditions such as heart failure, being older than 40, or use of opioids.    Complex (or mixed) sleep apnea  means you have both obstructive and central sleep apnea.    Common signs and symptoms:   Loud snoring or long pauses in breathing    Feeling sleepy, slow, and tired during the day    Snorting, gasping, or choking while you sleep, and waking up suddenly because of these    Feeling irritable during the day    Dry mouth or a headache in the mornings    Heavy night sweating    A hard time thinking, remembering things, or focusing on your tasks the following day    Call your local emergency number (911 in the ) if:   You have chest pain or trouble breathing.      Call your doctor if:   You have new or worsening signs or symptoms.     You have questions or concerns about your condition or care.    Treatment  depends on the kind of apnea you have.  A mouth device  may be needed if you have mild sleep apnea. These are designed to keep your throat open. Ask your dentist or healthcare provider about the best mouth device for you.    A machine  may be used to help you get more air during sleep. A mask may be placed over your nose and mouth, or just your nose. The mask is hooked to the machine. You will get air through the mask.    A continuous positive airway pressure (CPAP) machine  is used to keep your  airway open during sleep. The machine blows a gentle stream of air into the mask when you breathe. This helps keep your airway open so you can breathe more regularly. Extra oxygen may be given through the machine.         A bilevel positive airway pressure (BiPAP) machine  gives air but lowers the pressure when you breathe out.    An adaptive servo-ventilator (ASV)  is a machine that learns your usual breathing pattern. Then, it uses pressure to give you air and prevent stops in your breathing.    Surgery  to expand your airway or remove extra tissues may be needed. Surgery is usually only considered if other treatments do not work.    Manage or prevent sleep apnea:   Reach and maintain a healthy weight.  Ask your healthcare provider what a healthy weight is for you. Ask your provider to help you create a safe weight loss plan if you are overweight. Even a small goal of a 10% weight loss can improve your symptoms.    Do not smoke.  Nicotine and other chemicals in cigarettes and cigars can cause lung damage. Ask your healthcare provider for information if you currently smoke and need help to quit. E-cigarettes or smokeless tobacco still contain nicotine. Talk to your healthcare provider before you use these products.    Do not drink alcohol or take sedative medicine before you go to sleep.  Alcohol and sedatives can relax the muscles and tissues around your throat. This can block the airflow to your lungs.    Sleep on your side or use pillows designed to prevent sleep apnea.  This prevents your tongue or other tissues from blocking your throat. You can also raise the head of your bed.    Follow up with your doctor or specialist as directed:  You may need to have blood tests during your follow-up visits. Work with your provider to find the right breathing support equipment and settings for you. Write down your questions so you remember to ask them during your visits.  © Copyright [employer] 2023 Information is for End  User's use only and may not be sold, redistributed or otherwise used for commercial purposes.  The above information is an  only. It is not intended as medical advice for individual conditions or treatments. Talk to your doctor, nurse or pharmacist before following any medical regimen to see if it is safe and effective for you.

## 2024-04-10 NOTE — PROGRESS NOTES
Assessment/Plan:     Problem List Items Addressed This Visit          Respiratory    Obstructive sleep apnea (Chronic)     Patient has a history of mild obstructive sleep apnea.  She does use and benefit from her current CPAP.  Her compliance data is revealed from March 2, 2024 to April 2, 2024 patient is compliant with average usage of 5 hours and 45 minutes this is on auto CPAP 5 to 20 cm of water pressure.  Her device pressure 90% of the time is 8.  And AHI is reduced to 1.8 events.  All she is doing well she does have some fatigue during the day this is likely multifactorial she has 0 leak and I did invite her for possible evaluation for mask change she will consider this         Relevant Orders    PAP DME Resupply/Reorder       Behavioral Health    Former smoker - Primary (Chronic)     The patient she is a former smoker however she stopped smoking at least 25 years ago.  Not eligible for CT screening of chest.  Basically she asymptomatic but I will order a chest x-ray            Neurology/Sleep    Primary insomnia (Chronic)     Patient has a history of mild obstructive sleep apnea.  However I believe this diagnostic test was done in 2018 and although it is viewed is 5.8 events per hour if I believe it was likely higher.  However patient does have a few medical conditions she does have urinary frequency during the nighttime she does struggle with daytime hypersomnolence and spite of using and benefiting from auto CPAP for which AHI is reduced to 1.8.  She would benefit from seeing a specialist for her primary insomnia patient is agreeable to this plan of care          Other Visit Diagnoses       Chronic cough        Relevant Orders    XR chest pa & lateral              No follow-ups on file.  All questions are answered to the patient's satisfaction and understanding.  She verbalizes understanding.  She is encouraged to call with any further questions or concerns.    Portions of the record may have been created  "with voice recognition software.  Occasional wrong word or \"sound a like\" substitutions may have occurred due to the inherent limitations of voice recognition software.  Read the chart carefully and recognize, using context, where substitutions have occurred.    Electronically Signed by JHON Mccabe    ______________________________________________________________________    Chief Complaint: No chief complaint on file.      Patient ID: Rashmi is a 68 y.o. y.o. female has a past medical history of 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HCC), Brain tumor (benign) (HCC), Colon polyp, Constipation, CPAP (continuous positive airway pressure) dependence, GERD (gastroesophageal reflux disease), Hiatal hernia, History of arteriovenous malformation, Liver problem, and Sleep apnea.    4/10/2024  Patient presents today for follow-up visit.  KRISTEL Monroe is a 68-year-old female who was last seen in the office in May 2023.  Was diagnosed with obstructive sleep apnea in December 2018 her apneic hypopneic index was 7.1 but increased to 22.9 with REM sleep.  She currently is on auto CPAP 5 to 20 cm of water pressure.  Renal recently saw rheumatologist.  This was for evaluation of postmenopausal osteoporosis and GERD.    Review of Systems   Constitutional:  Positive for fatigue.   HENT: Negative.     Eyes: Negative.    Respiratory:  Positive for cough and shortness of breath.         On occasion will cough shortness of breath with exertion   Cardiovascular: Negative.    Gastrointestinal: Negative.    Endocrine: Negative.    Genitourinary: Negative.    Musculoskeletal: Negative.    Allergic/Immunologic: Negative.    Neurological: Negative.    Hematological: Negative.    Psychiatric/Behavioral: Negative.         Smoking history: She reports that she quit smoking about 25 years ago. Her smoking use included cigarettes. She started smoking about 50 years ago. She has a 75 pack-year smoking history. She has never used smokeless " "tobacco.    The following portions of the patient's history were reviewed and updated as appropriate: allergies, current medications, past family history, past medical history, past social history, past surgical history, and problem list.    Immunization History   Administered Date(s) Administered    COVID-19 MODERNA VACC 0.5 ML IM 04/22/2021, 05/20/2021, 03/25/2022    Influenza, recombinant, quadrivalent,injectable, preservative free 10/28/2020    Pneumococcal Conjugate Vaccine 20-valent (Pcv20), Polysace 05/03/2023    Tdap 10/28/2020     Current Outpatient Medications   Medication Sig Dispense Refill    acetaminophen (TYLENOL) 500 mg tablet Take 1 tablet by mouth      B Complex-C CAPS Take 1 capsule by mouth daily      Calcium Carb-Cholecalciferol (OSCAL-D) 500 mg-200 units per tablet Take 1 tablet by mouth 2 (two) times a day with meals      co-enzyme Q-10 30 MG capsule Take 30 mg by mouth 3 (three) times a day      estradiol (ESTRACE VAGINAL) 0.1 mg/g vaginal cream Insert 0.5 g into the vagina 3 (three) times a week 42.5 g 10    famotidine-calcium carbonate-magnesium hydroxide (PEPCID COMPLETE) -165 MG CHEW Chew 1 tablet daily as needed for heartburn      LORATADINE ALLERGY RELIEF PO Take by mouth      magnesium gluconate (MAGONATE) 500 mg tablet Take 500 mg by mouth once      Mirabegron ER (Myrbetriq) 50 MG TB24 take 1 tablet by mouth once daily 90 tablet 1    zinc gluconate 50 mg tablet Take 50 mg by mouth daily       No current facility-administered medications for this visit.     Allergies: Patient has no known allergies.    Objective:  Vitals:    04/10/24 0928   BP: 138/86   BP Location: Right arm   Patient Position: Sitting   Cuff Size: Extra-Large   Pulse: 76   Resp: 12   Temp: 97.8 °F (36.6 °C)   TempSrc: Temporal   SpO2: 98%   Weight: 73.8 kg (162 lb 12.8 oz)   Height: 4' 11\" (1.499 m)   Oxygen Therapy  SpO2: 98 %  .  Wt Readings from Last 3 Encounters:   04/10/24 73.8 kg (162 lb 12.8 oz) "   10/16/23 71.7 kg (158 lb)   05/03/23 69.9 kg (154 lb)     Body mass index is 32.88 kg/m².    Physical Exam  HENT:      Head: Normocephalic.      Nose: Nose normal.      Mouth/Throat:      Mouth: Mucous membranes are moist.      Pharynx: Oropharynx is clear.      Comments: Mallampati 4  Cardiovascular:      Rate and Rhythm: Normal rate and regular rhythm.      Pulses: Normal pulses.   Pulmonary:      Effort: Pulmonary effort is normal.      Breath sounds: Normal breath sounds.   Musculoskeletal:         General: Normal range of motion.      Cervical back: Normal range of motion.   Skin:     General: Skin is warm and dry.      Capillary Refill: Capillary refill takes less than 2 seconds.   Neurological:      General: No focal deficit present.      Mental Status: She is alert and oriented to person, place, and time.   Psychiatric:         Mood and Affect: Mood normal.         Behavior: Behavior normal.         Lab Review:   No visits with results within 2 Month(s) from this visit.   Latest known visit with results is:   Lab on 11/06/2023   Component Date Value    A/G Ratio 11/06/2023 1.59     Albumin Electrophoresis 11/06/2023 61.4     Albumin CONC 11/06/2023 3.93     Alpha 1 11/06/2023 4.8     ALPHA 1 CONC 11/06/2023 0.31     Alpha 2 11/06/2023 11.6     ALPHA 2 CONC 11/06/2023 0.74     Beta-1 11/06/2023 7.1     BETA 1 CONC 11/06/2023 0.45     Beta-2 11/06/2023 4.1     BETA 2 CONC 11/06/2023 0.26     Gamma Globulin 11/06/2023 11.0     GAMMA CONC 11/06/2023 0.70     Total Protein 11/06/2023 6.4     SPEP Interpretation 11/06/2023 See Comment     PTH 11/06/2023 64.8     Sodium 11/06/2023 138     Potassium 11/06/2023 4.0     Chloride 11/06/2023 105     CO2 11/06/2023 28     ANION GAP 11/06/2023 5     BUN 11/06/2023 18     Creatinine 11/06/2023 0.76     Glucose 11/06/2023 87     Calcium 11/06/2023 9.2     AST 11/06/2023 28     ALT 11/06/2023 36     Alkaline Phosphatase 11/06/2023 134 (H)     Total Protein 11/06/2023 6.9      Albumin 11/06/2023 4.2     Total Bilirubin 11/06/2023 0.31     eGFR 11/06/2023 82     Immunofixation Interpret* 11/06/2023 See Comment        Past Surgical History:   Procedure Laterality Date    BRAIN SURGERY      BREAST BIOPSY Right     COLONOSCOPY      CRANIOTOMY      CYSTOSCOPY  03/15/2023    [facility]    EGD      LIVER SURGERY      adenoma removed from liver    NC COLONOSCOPY FLX DX W/COLLJ SPEC WHEN PFRMD N/A 03/20/2017    Procedure: EGD AND COLONOSCOPY;  Surgeon: Rachid Garcia MD;  Location: [facility];  Service: Gastroenterology    NC CYSTO W/REMOVAL OF LESIONS SMALL N/A 12/04/2019    Procedure: TRANSURETHRAL RESECTION OF BLADDER TUMOR (TURBT);  Surgeon: Girish Springer MD;  Location: [facility];  Service: Urology    NC CYSTOURETHROSCOPY WITH BIOPSY N/A 08/30/2022    Procedure: CYSTOSCOPY WITH BLADDER BIOPSY, FULGURATION;  Surgeon: Radames Fraire MD;  Location: [facility];  Service: Urology    TUBAL LIGATION          Family History   Problem Relation Age of Onset    Cancer Mother 70        lung cancer    Lung cancer Mother     Cancer Father 38        brain cancer    Brain cancer Father     Melanoma Sister     Lung cancer Brother     Prostate cancer Brother     Obesity Daughter     Diabetes Son     Obesity Son     Asthma Son     Ovarian cancer Maternal Grandmother     Breast cancer Paternal Grandmother         Diagnostics:  I have personally reviewed pertinent films in PACS  none pertinent  Office Spirometry Results:     ESS:    No results found.

## 2024-04-12 ENCOUNTER — HOSPITAL ENCOUNTER (OUTPATIENT)
Dept: RADIOLOGY | Facility: HOSPITAL | Age: 69
Discharge: HOME | End: 2024-04-12
Attending: OBSTETRICS & GYNECOLOGY
Payer: COMMERCIAL

## 2024-04-12 DIAGNOSIS — Z12.31 ENCOUNTER FOR SCREENING MAMMOGRAM FOR MALIGNANT NEOPLASM OF BREAST: ICD-10-CM

## 2024-04-12 PROCEDURE — 77063 BREAST TOMOSYNTHESIS BI: CPT

## 2024-04-12 PROCEDURE — 77067 SCR MAMMO BI INCL CAD: CPT

## 2024-04-15 LAB

## 2024-05-09 ENCOUNTER — PROCEDURE VISIT (OUTPATIENT)
Dept: UROLOGY | Facility: AMBULATORY SURGERY CENTER | Age: 69
End: 2024-05-09
Payer: COMMERCIAL

## 2024-05-09 ENCOUNTER — OFFICE VISIT (OUTPATIENT)
Dept: FAMILY MEDICINE CLINIC | Facility: CLINIC | Age: 69
End: 2024-05-09
Payer: COMMERCIAL

## 2024-05-09 VITALS
WEIGHT: 160 LBS | DIASTOLIC BLOOD PRESSURE: 80 MMHG | HEART RATE: 64 BPM | HEIGHT: 59 IN | RESPIRATION RATE: 16 BRPM | BODY MASS INDEX: 32.25 KG/M2 | OXYGEN SATURATION: 100 % | SYSTOLIC BLOOD PRESSURE: 122 MMHG

## 2024-05-09 VITALS
BODY MASS INDEX: 32.25 KG/M2 | OXYGEN SATURATION: 97 % | DIASTOLIC BLOOD PRESSURE: 80 MMHG | SYSTOLIC BLOOD PRESSURE: 142 MMHG | WEIGHT: 160 LBS | HEIGHT: 59 IN | HEART RATE: 78 BPM

## 2024-05-09 DIAGNOSIS — Z00.00 ANNUAL PHYSICAL EXAM: Primary | ICD-10-CM

## 2024-05-09 DIAGNOSIS — Z12.2 ENCOUNTER FOR SCREENING FOR LUNG CANCER: ICD-10-CM

## 2024-05-09 DIAGNOSIS — E55.9 VITAMIN D INSUFFICIENCY: ICD-10-CM

## 2024-05-09 DIAGNOSIS — C67.2 MALIGNANT NEOPLASM OF LATERAL WALL OF URINARY BLADDER (HCC): ICD-10-CM

## 2024-05-09 DIAGNOSIS — M72.2 PLANTAR FASCIITIS, RIGHT: ICD-10-CM

## 2024-05-09 DIAGNOSIS — K63.5 POLYP OF COLON, UNSPECIFIED PART OF COLON, UNSPECIFIED TYPE: ICD-10-CM

## 2024-05-09 DIAGNOSIS — I10 ESSENTIAL HYPERTENSION: ICD-10-CM

## 2024-05-09 DIAGNOSIS — D03.59 MELANOMA IN SITU OF BACK (HCC): ICD-10-CM

## 2024-05-09 DIAGNOSIS — C67.0 MALIGNANT NEOPLASM OF TRIGONE OF URINARY BLADDER (HCC): Primary | ICD-10-CM

## 2024-05-09 DIAGNOSIS — D42.0 ATYPICAL INTRACRANIAL MENINGIOMA (HCC): ICD-10-CM

## 2024-05-09 DIAGNOSIS — Z13.29 SCREENING FOR THYROID DISORDER: ICD-10-CM

## 2024-05-09 DIAGNOSIS — R35.0 URINARY FREQUENCY: ICD-10-CM

## 2024-05-09 DIAGNOSIS — Z87.891 FORMER SMOKER: ICD-10-CM

## 2024-05-09 DIAGNOSIS — Z13.220 SCREENING CHOLESTEROL LEVEL: ICD-10-CM

## 2024-05-09 DIAGNOSIS — M81.6 LOCALIZED OSTEOPOROSIS WITHOUT CURRENT PATHOLOGICAL FRACTURE: ICD-10-CM

## 2024-05-09 LAB
SL AMB  POCT GLUCOSE, UA: NORMAL
SL AMB LEUKOCYTE ESTERASE,UA: NORMAL
SL AMB POCT BILIRUBIN,UA: NORMAL
SL AMB POCT BLOOD,UA: NORMAL
SL AMB POCT CLARITY,UA: CLEAR
SL AMB POCT COLOR,UA: YELLOW
SL AMB POCT KETONES,UA: NORMAL
SL AMB POCT NITRITE,UA: NORMAL
SL AMB POCT PH,UA: 5
SL AMB POCT SPECIFIC GRAVITY,UA: 1
SL AMB POCT URINE PROTEIN: NORMAL
SL AMB POCT UROBILINOGEN: 0.2

## 2024-05-09 PROCEDURE — 52000 CYSTOURETHROSCOPY: CPT | Performed by: UROLOGY

## 2024-05-09 PROCEDURE — 99397 PER PM REEVAL EST PAT 65+ YR: CPT | Performed by: FAMILY MEDICINE

## 2024-05-09 PROCEDURE — 99214 OFFICE O/P EST MOD 30 MIN: CPT | Performed by: FAMILY MEDICINE

## 2024-05-09 PROCEDURE — 81002 URINALYSIS NONAUTO W/O SCOPE: CPT | Performed by: UROLOGY

## 2024-05-09 RX ORDER — CEFAZOLIN SODIUM 1 G/50ML
1000 SOLUTION INTRAVENOUS ONCE
OUTPATIENT
Start: 2024-05-09 | End: 2024-05-09

## 2024-05-09 NOTE — PROGRESS NOTES
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  I removed a small polyp in August 2022.  She is now on yearly surveillance.  Her last upper tract imaging was a renal bladder ultrasound from March 2023 which revealed a 7 mm avascular nonshadowing focus on the right lateral wall of the bladder.  This ultrasound was performed just after I performed cystoscopy which showed no evidence of bladder wall abnormalities or lesions.  She denies any gross hematuria.  She returns for routine cystoscopy today.      Cystoscopy Procedure note:    Risk and benefits of flexible cystoscopy were discussed. Informed consent was obtained. A urine dip is adequate for cystoscopy. The patient was placed into the modified supine position. Her genitalia was prepped and draped in a sterile fashion. Viscous lidocaine was instilled into the urethra. Flexible cystoscopy was then performed. The bladder was thoroughly inspected. Both ureteral orifices were visualized with clear efflux of urine. The bladder mucosa was thoroughly inspected.  Along the posterior bladder wall a small 1 to 2 cm polyp was appreciated.  This had a low-grade and superficial appearance to it.  A female office staff member was present throughout the entire procedure.    Impression: History of bladder polyp/papilloma with small posterior wall recurrence    Plan: I recommend returning to the operating room for cystoscopy with bladder biopsy and fulguration along with intravesical instillation of gemcitabine.  Risk of the procedure including, not limited to bleeding, infection, sepsis, perforation, disease recurrence, need for additional treatment in the future was discussed and reviewed.  Informed consent obtained.

## 2024-05-09 NOTE — PATIENT INSTRUCTIONS
Plantar Fasciitis   WHAT YOU NEED TO KNOW:   What is plantar fasciitis?  Plantar fasciitis is swelling of the plantar fascia. The plantar fascia is a thick band of tissue that connects your heel bone to your toes. This part of your foot helps support the arch of your foot and absorbs shock. Tension and stress can cause small tears on the thick band of tissue. These small tears can grow larger with repeated stretching and tearing. The band of tissue can become swollen and painful.       What increases my risk for plantar fasciitis?   Being 40 to 60 years old or a woman    Pregnancy    Obesity    High impact exercises such as running    Activities or sports that involve jumping, such as basketball    A sudden increase in the intensity of an activity    Flat feet, high arches, or feet that rotate inward when you walk or run    Tight calf muscles and tendons    Wearing shoes that do not support your feet, such as shoes that are worn out    Standing on hard surfaces for long periods of time    What are the signs and symptoms of plantar fasciitis?   Pain on the bottom of your foot near your heel    Pain that is worse right after you get out of bed or after a long period of rest    Pain after activity    Stiffness in your foot    Heel swelling    How is plantar fasciitis diagnosed?  Your healthcare provider will examine your foot and ask about your activities. He or she may check the movement of your foot and ankle. You may need an x-ray to check for a fracture or heel spur (bone growth on your heel).  How is plantar fasciitis treated?   Medicines  may be given to decrease swelling and pain.    Shoe inserts, splints, or tape  help support your foot and decrease stress on your plantar fascia. A night splint may help stretch your plantar fascia while you sleep.    Stretches and exercises  can help decrease pain and swelling. They can also help strengthen the muscles that support your heel and foot.    Surgery  may be needed  when other treatments do not work. During surgery, the plantar fascia is  from your heel.    How can I manage my symptoms?   Wear your splint or shoe inserts as directed.  You may need to wear a splint at night to keep your foot stretched while you sleep. This will help prevent sharp pain first thing in the morning. Shoe inserts will help decrease stress on your plantar fascia when you walk or exercise.    Apply ice on your plantar fascia.  Ice helps decrease swelling and pain. Fill a water bottle with water and freeze it. Wrap a towel around the bottle or cover it with a pillow case. Roll the water bottle under your foot for 10 minutes each morning and evening.    Massage your plantar fascia as directed.  This may help decrease swelling and pain. Roll a golf ball under your foot for 10 minutes. Repeat 3 times each day.    Go to physical therapy as directed.  A physical therapist teaches you exercises to help improve movement and strength, and to decrease pain.    How can I help prevent plantar fasciitis?   Maintain a healthy weight.  This will help decrease stress on your feet. Ask your healthcare provider what a healthy weight is for you. Ask him or her to help you create a weight loss plan, if needed.    Do low-impact exercises.  Low-impact exercises decrease stress on your plantar fascia. Examples include swimming or bicycling.    Start new activities slowly.  Increase the intensity and time gradually.    Wear shoes that fit well and support your arch.  Replace your shoes before the padding or shock absorption wears out. Do not walk or  bare feet or sandals for long periods of time.    When should I call my doctor?   Your pain or swelling suddenly increases.    You develop knee, hip, or back pain.    You have questions or concerns about your condition or care.    CARE AGREEMENT:   You have the right to help plan your care. Learn about your health condition and how it may be treated. Discuss  treatment options with your healthcare providers to decide what care you want to receive. You always have the right to refuse treatment. The above information is an  only. It is not intended as medical advice for individual conditions or treatments. Talk to your doctor, nurse or pharmacist before following any medical regimen to see if it is safe and effective for you.  © Copyright [employer] 2023 Information is for End User's use only and may not be sold, redistributed or otherwise used for commercial purposes.      Plantar Fasciitis Exercises   WHAT YOU NEED TO KNOW:   What do I need to know about plantar fasciitis exercises?  Plantar fasciitis exercises help stretch your plantar fascia, calf muscles, and Achilles tendon. They also help strengthen the muscles that support your heel and foot. Exercises and stretching can help prevent plantar fasciitis from getting worse or coming back.       How do I do plantar fasciitis exercises?  Ask your healthcare provider when to start these exercises and how often to do them.  Slant board stretch:  Stand on a slanted board with your toes higher than your heel. Press your heel into the board. Keep your knee slightly bent. Hold this position for 1 minute. Repeat 5 times.         Heel stretch:  Stand up straight with your hands on a wall. Place your injured leg slightly behind your other leg. Keep your heels flat on the floor, lean forward, and bend both knees. Hold for 30 seconds.         Calf stretch:  Stand up straight with your hands on a wall. Step forward so that your uninjured foot is in front of your injured foot. Keep your front leg bent and your back leg straight. Gently lean forward until you feel your calf stretch. Hold for 30 seconds and then relax.         Seated plantar fascia stretch:  Sit on a firm surface, such as the floor or a mat. Extend your legs out in front of you. Raise your injured foot a few inches off the ground. Keep your leg straight.  Grab the toes of your injured foot and pull them toward you. With your other hand, feel your plantar fascia. You should feel it press outward. Hold for 30 seconds. If you cannot reach your toes, loop a towel or tie around your foot. Gently pull on the towel or tie and flex your toes toward you.         Heel raises:  Stand on the injured leg. Raise your other leg off the ground. Hold onto a railing or wall for balance. Slowly rise up on the toes of your injured leg. Hold for 5 seconds. Slowly lower your heel to the ground.         Toe curls:  Place a towel on the floor. Put your foot flat on the towel. Grab the towel with your toes by curling them around the towel. Lift the towel up with your toes.         Toe taps:  Sit down and place your foot flat on the floor. Keep your heel on the floor. Point all your toes up toward the ceiling. While the 4 smaller toes are pointed up, bend your big toe down and tap it on the ground. Do 10 to 50 taps. Point all 5 toes up toward the ceiling again. This time keep your big toe pointed up and tap the 4 smaller toes on the ground. Do 10 to 50 taps each time.       When should I call my doctor or physical therapist?   Your pain and swelling increase.    You develop new knee, hip, or back pain.    You have questions or concerns about your condition or care.    CARE AGREEMENT:   You have the right to help plan your care. Learn about your health condition and how it may be treated. Discuss treatment options with your healthcare providers to decide what care you want to receive. You always have the right to refuse treatment. The above information is an  only. It is not intended as medical advice for individual conditions or treatments. Talk to your doctor, nurse or pharmacist before following any medical regimen to see if it is safe and effective for you.  © Copyright [employer] 2023 Information is for End User's use only and may not be sold, redistributed or otherwise used  for commercial purposes.      Wellness Visit for Adults   AMBULATORY CARE:   A wellness visit  is when you see your healthcare provider to get screened for health problems. Your healthcare provider will also give you advice on how to stay healthy. Write down your questions so you remember to ask them. Ask your healthcare provider how often you should have a wellness visit.  What happens at a wellness visit:  Your healthcare provider will ask about your health, and your family history of health problems. This includes high blood pressure, heart disease, and cancer. He or she will ask if you have symptoms that concern you, if you smoke, and about your mood. You may also be asked about your intake of medicines, supplements, food, and alcohol. Any of the following may be done:  Your weight  will be checked. Your height may also be checked so your body mass index (BMI) can be calculated. Your BMI shows if you are at a healthy weight.    Your blood pressure  and heart rate will be checked. Your temperature may also be checked.    Blood and urine tests  may be done. Blood tests may be done to check your cholesterol levels. Abnormal cholesterol levels increase your risk for heart disease and stroke. You may also need a blood or urine test to check for diabetes if you are at increased risk. Urine tests may be done to look for signs of an infection or kidney disease.    A physical exam  includes checking your heartbeat and lungs with a stethoscope. Your healthcare provider may also check your skin to look for sun damage.    Screening tests  may be recommended. A screening test is done to check for diseases that may not cause symptoms. The screening tests you may need depend on your age, gender, family history, and lifestyle habits. For example, colorectal screening may be recommended if you are 50 years old or older.    Screening tests you need if you are a woman:   A Pap smear  is used to screen for cervical cancer. Pap  smears are usually done every 3 to 5 years depending on your age. You may need them more often if you have had abnormal Pap smear test results in the past. Ask your healthcare provider how often you should have a Pap smear.    A mammogram  is an x-ray of your breasts to screen for breast cancer. Experts recommend mammograms every 2 years starting at age 50 years. You may need a mammogram at age 49 years or younger if you have an increased risk for breast cancer. Talk to your healthcare provider about when you should start having mammograms and how often you need them.    Vaccines you may need:   Get an influenza vaccine  every year. The influenza vaccine protects you from the flu. Several types of viruses cause the flu. The viruses change over time, so new vaccines are made each year.    Get a tetanus-diphtheria (Td) booster vaccine  every 10 years. This vaccine protects you against tetanus and diphtheria. Tetanus is a severe infection that may cause painful muscle spasms and lockjaw. Diphtheria is a severe bacterial infection that causes a thick covering in the back of your mouth and throat.    Get a human papillomavirus (HPV) vaccine  if you are female and aged 19 to 26 or male 19 to 21 and never received it. This vaccine protects you from HPV infection. HPV is the most common infection spread by sexual contact. HPV may also cause vaginal, penile, and anal cancers.    Get a pneumococcal vaccine  if you are aged 65 years or older. The pneumococcal vaccine is an injection given to protect you from pneumococcal disease. Pneumococcal disease is an infection caused by pneumococcal bacteria. The infection may cause pneumonia, meningitis, or an ear infection.    Get a shingles vaccine  if you are 60 or older, even if you have had shingles before. The shingles vaccine is an injection to protect you from the varicella-zoster virus. This is the same virus that causes chickenpox. Shingles is a painful rash that develops in  people who had chickenpox or have been exposed to the virus.    How to eat healthy:  My Plate is a model for planning healthy meals. It shows the types and amounts of foods that should go on your plate. Fruits and vegetables make up about half of your plate, and grains and protein make up the other half. A serving of dairy is included on the side of your plate. The amount of calories and serving sizes you need depends on your age, gender, weight, and height. Examples of healthy foods are listed below:  Eat a variety of vegetables  such as dark green, red, and orange vegetables. You can also include canned vegetables low in sodium (salt) and frozen vegetables without added butter or sauces.    Eat a variety of fresh fruits , canned fruit in 100% juice, frozen fruit, and dried fruit.    Include whole grains.  At least half of the grains you eat should be whole grains. Examples include whole-wheat bread, wheat pasta, brown rice, and whole-grain cereals such as oatmeal.    Eat a variety of protein foods such as seafood (fish and shellfish), lean meat, and poultry without skin (turkey and chicken). Examples of lean meats include pork leg, shoulder, or tenderloin, and beef round, sirloin, tenderloin, and extra lean ground beef. Other protein foods include eggs and egg substitutes, beans, peas, soy products, nuts, and seeds.    Choose low-fat dairy products such as skim or 1% milk or low-fat yogurt, cheese, and cottage cheese.    Limit unhealthy fats  such as butter, hard margarine, and shortening.       Exercise:  Exercise at least 30 minutes per day on most days of the week. Some examples of exercise include walking, biking, dancing, and swimming. You can also fit in more physical activity by taking the stairs instead of the elevator or parking farther away from stores. Include muscle strengthening activities 2 days each week. Regular exercise provides many health benefits. It helps you manage your weight, and decreases  your risk for type 2 diabetes, heart disease, stroke, and high blood pressure. Exercise can also help improve your mood. Ask your healthcare provider about the best exercise plan for you.       General health and safety guidelines:   Do not smoke.  Nicotine and other chemicals in cigarettes and cigars can cause lung damage. Ask your healthcare provider for information if you currently smoke and need help to quit. E-cigarettes or smokeless tobacco still contain nicotine. Talk to your healthcare provider before you use these products.    Limit alcohol.  A drink of alcohol is 12 ounces of beer, 5 ounces of wine, or 1½ ounces of liquor.    Lose weight, if needed.  Being overweight increases your risk of certain health conditions. These include heart disease, high blood pressure, type 2 diabetes, and certain types of cancer.    Protect your skin.  Do not sunbathe or use tanning beds. Use sunscreen with a SPF 15 or higher. Apply sunscreen at least 15 minutes before you go outside. Reapply sunscreen every 2 hours. Wear protective clothing, hats, and sunglasses when you are outside.    Drive safely.  Always wear your seatbelt. Make sure everyone in your car wears a seatbelt. A seatbelt can save your life if you are in an accident. Do not use your cell phone when you are driving. This could distract you and cause an accident. Pull over if you need to make a call or send a text message.    Practice safe sex.  Use latex condoms if are sexually active and have more than one partner. Your healthcare provider may recommend screening tests for sexually transmitted infections (STIs).    Wear helmets, lifejackets, and protective gear.  Always wear a helmet when you ride a bike or motorcycle, go skiing, or play sports that could cause a head injury. Wear protective equipment when you play sports. Wear a lifejacket when you are on a boat or doing water sports.    © Copyright [employer] 2023 Information is for End User's use only and may  not be sold, redistributed or otherwise used for commercial purposes.  The above information is an  only. It is not intended as medical advice for individual conditions or treatments. Talk to your doctor, nurse or pharmacist before following any medical regimen to see if it is safe and effective for you.

## 2024-05-09 NOTE — PROGRESS NOTES
Assessment/Plan:   Diagnoses and all orders for this visit:    Annual physical exam  - reviewed PMHx, PSHx, meds, allergies, FHx, Soc Hx and Sexual Hx   - due for screening labs - script given   - declined STD screening   - UTD with Tdap   - UTD with COVID IMMs and Booster  - UTD with PCV20   - no Flu vaccine this season   - discussed diet and encouraged exercise   - [place] [facility] - last annual was 5/2023 - advised to schedule annual - pt aware and agreeable   - last Cervical Ca screening was 2022  - UTD with Mammo (4/2024) - annual screening advised   - last Cscope was in 6/2022 with 7yr recall 2/2 h/o colon polyps  - UTD with Dental    - overdue for Optho - advised to schedule   - RTO in 1yr for annual exam or sooner if with abnml labs - pt aware and agreeable     BMI 32.0-32.9,adult  - BMI 32  - discussed diet and encouraged exercise  - educated that it takes 3500 emily to lose 1lb  - advised to cut down on carbs, 5 servings of fruits/veggies/day, increase water intake (65-80oz/water/day)   - appropriate weight loss goal for women = 0.5-1lb/week  - per the Heart Association - 150mins/exercise/week, but to lose and maintain weight 200-300mins/exercise/week     Essential hypertension  -     Albumin / creatinine urine ratio; Future  - BP stable  - her antihypertensives were stopped in 2022  - cont to monitor and will check labs     Former smoker  -     CBC and differential  -     CT lung screening program; Future  Encounter for screening for lung cancer  -     CT lung screening program; Future    Polyp of colon, unspecified part of colon, unspecified type  - last Cscope was in 6/2022 with 7yr recall 2/2 h/o colon polyps    Localized osteoporosis without current pathological fracture  - follows with Rheum     Vitamin D insufficiency  -     Vitamin D 25 hydroxy; Future    Malignant neoplasm of lateral wall of urinary bladder (HCC)  - follows with Uro - has appt scheduled for later today     Melanoma in  situ of back (HCC)  - follows Q6months with Derm - last OV was yesterday     Atypical intracranial meningioma (HCC)  - has to schedule 5yr f/u with NeuroSurg    Screening cholesterol level  -     Lipid panel; Future    Screening for thyroid disorder  -     TSH, 3rd generation with Free T4 reflex    Plantar fasciitis, right  -     Ambulatory Referral to Physical Therapy; Future  - advised OTC NSAIDs and stretching exercises d/w pt and her spouse  - educational handouts given   - referral for PT in chart     Other orders  -     Cholecalciferol (VITAMIN D3) 1,000 units tablet; Take 1,000 Units by mouth daily          Subjective:    Patient ID: Rashmi Bray is a 68 y.o. female.  Rashmi Bray is a 68 y.o. female who presents to the office with her  for an annual exam  - Specialists: Uro (has an appt later today), Derm (goes Q6months - last OV was yesterday), Rheum, Pulm, Sleep Med   - PMHx: GERD, MILEY on CPAP, AVM, meningioma, melanoma in situ of back, adeno of bladder, diverticulosis, colon polyps, memory difficulties, h/o COVID (12/2021)   - allergies: NKDA  - Meds: see med rec   - PSHx: craniotomy, adenoma removed from liver, TURBT, tubal ligation   - FHx: F (brain ca), M (lung ca), Sister (melanoma), Brother (lung ca), Brother (prostate ca), MGM (ovarian ca), PGM (breast ca)  - Immunizations: Tdap, UTD with COVID IMMs and Booster, UTD with PCV20   - GYN Hx: [facility] - last annual was 5/2023, last Cervical Ca screening was 2022  - Hm: Cscope in 6/2022 - 7yr f/u (2029), UTD with Mammo (4/2024) - annual screening advised   - diet/exercise: chair exercises, diet: eggs daily  - social: former smoker (quit in 1999, 2-3PPD/30yrs), social EtOH   - sexual Hx: declined STD screening in the office today   - last vision: wears glasses, Dr Jules - overdue   - last dental: [facility] - recent dentures    - (+) R-heel tenderness  - ROS: today in the office pt denies F/C/N/V/HA/visual  "changes/CP/palpitations/SOB/wheezing/cough/abd pain/D/LE edema           The following portions of the patient's history were reviewed and updated as appropriate: allergies, current medications, past family history, past medical history, past social history, past surgical history and problem list.    Review of Systems  as per HPI    Objective:  /80 (BP Location: Right arm, Patient Position: Sitting, Cuff Size: Standard)   Pulse 64   Resp 16   Ht 4' 11\" (1.499 m)   Wt 72.6 kg (160 lb)   SpO2 100%   BMI 32.32 kg/m²    Physical Exam  Vitals reviewed.   Constitutional:       General: She is not in acute distress.     Appearance: Normal appearance. She is not ill-appearing, toxic-appearing or diaphoretic.   HENT:      Head: Normocephalic and atraumatic.      Right Ear: External ear normal.      Left Ear: External ear normal.      Nose: Nose normal.   Eyes:      General: No scleral icterus.        Right eye: No discharge.         Left eye: No discharge.      Extraocular Movements: Extraocular movements intact.      Conjunctiva/sclera: Conjunctivae normal.   Cardiovascular:      Rate and Rhythm: Normal rate and regular rhythm.      Heart sounds: Normal heart sounds. No murmur heard.     No friction rub. No gallop.   Pulmonary:      Effort: Pulmonary effort is normal.      Breath sounds: Normal breath sounds.   Abdominal:      Palpations: Abdomen is soft.   Musculoskeletal:         General: Normal range of motion.      Right lower leg: No edema.      Left lower leg: No edema.      Comments: (+) R-heel tenderness    Neurological:      General: No focal deficit present.      Mental Status: She is alert and oriented to person, place, and time.   Psychiatric:         Mood and Affect: Mood normal.         Behavior: Behavior normal.           "

## 2024-05-10 ENCOUNTER — TELEPHONE (OUTPATIENT)
Age: 69
End: 2024-05-10

## 2024-05-17 ENCOUNTER — APPOINTMENT (OUTPATIENT)
Dept: LAB | Facility: HOSPITAL | Age: 69
End: 2024-05-17
Payer: COMMERCIAL

## 2024-05-17 DIAGNOSIS — Z13.220 SCREENING CHOLESTEROL LEVEL: ICD-10-CM

## 2024-05-17 DIAGNOSIS — E55.9 VITAMIN D INSUFFICIENCY: ICD-10-CM

## 2024-05-17 DIAGNOSIS — I10 ESSENTIAL HYPERTENSION: ICD-10-CM

## 2024-05-17 LAB
25(OH)D3 SERPL-MCNC: 79.7 NG/ML (ref 30–100)
BASOPHILS # BLD AUTO: 0.03 THOUSANDS/ÂΜL (ref 0–0.1)
BASOPHILS NFR BLD AUTO: 1 % (ref 0–1)
CHOLEST SERPL-MCNC: 196 MG/DL
CREAT UR-MCNC: 22.5 MG/DL
EOSINOPHIL # BLD AUTO: 0.14 THOUSAND/ÂΜL (ref 0–0.61)
EOSINOPHIL NFR BLD AUTO: 3 % (ref 0–6)
ERYTHROCYTE [DISTWIDTH] IN BLOOD BY AUTOMATED COUNT: 13.2 % (ref 11.6–15.1)
HCT VFR BLD AUTO: 37.2 % (ref 34.8–46.1)
HDLC SERPL-MCNC: 81 MG/DL
HGB BLD-MCNC: 11.8 G/DL (ref 11.5–15.4)
IMM GRANULOCYTES # BLD AUTO: 0.01 THOUSAND/UL (ref 0–0.2)
IMM GRANULOCYTES NFR BLD AUTO: 0 % (ref 0–2)
LDLC SERPL CALC-MCNC: 103 MG/DL (ref 0–100)
LYMPHOCYTES # BLD AUTO: 1.82 THOUSANDS/ÂΜL (ref 0.6–4.47)
LYMPHOCYTES NFR BLD AUTO: 39 % (ref 14–44)
MCH RBC QN AUTO: 27.8 PG (ref 26.8–34.3)
MCHC RBC AUTO-ENTMCNC: 31.7 G/DL (ref 31.4–37.4)
MCV RBC AUTO: 88 FL (ref 82–98)
MICROALBUMIN UR-MCNC: <7 MG/L
MICROALBUMIN/CREAT 24H UR: <31 MG/G CREATININE (ref 0–30)
MONOCYTES # BLD AUTO: 0.4 THOUSAND/ÂΜL (ref 0.17–1.22)
MONOCYTES NFR BLD AUTO: 9 % (ref 4–12)
NEUTROPHILS # BLD AUTO: 2.26 THOUSANDS/ÂΜL (ref 1.85–7.62)
NEUTS SEG NFR BLD AUTO: 48 % (ref 43–75)
NONHDLC SERPL-MCNC: 115 MG/DL
NRBC BLD AUTO-RTO: 0 /100 WBCS
PLATELET # BLD AUTO: 174 THOUSANDS/UL (ref 149–390)
PMV BLD AUTO: 11.3 FL (ref 8.9–12.7)
RBC # BLD AUTO: 4.24 MILLION/UL (ref 3.81–5.12)
TRIGL SERPL-MCNC: 59 MG/DL
TSH SERPL DL<=0.05 MIU/L-ACNC: 1.32 UIU/ML (ref 0.45–4.5)
WBC # BLD AUTO: 4.66 THOUSAND/UL (ref 4.31–10.16)

## 2024-05-17 PROCEDURE — 85025 COMPLETE CBC W/AUTO DIFF WBC: CPT | Performed by: FAMILY MEDICINE

## 2024-05-17 PROCEDURE — 80061 LIPID PANEL: CPT

## 2024-05-17 PROCEDURE — 36415 COLL VENOUS BLD VENIPUNCTURE: CPT

## 2024-05-17 PROCEDURE — 82570 ASSAY OF URINE CREATININE: CPT

## 2024-05-17 PROCEDURE — 82043 UR ALBUMIN QUANTITATIVE: CPT

## 2024-05-17 PROCEDURE — 84443 ASSAY THYROID STIM HORMONE: CPT | Performed by: FAMILY MEDICINE

## 2024-05-17 PROCEDURE — 82306 VITAMIN D 25 HYDROXY: CPT

## 2024-05-23 ENCOUNTER — TELEPHONE (OUTPATIENT)
Dept: FAMILY MEDICINE CLINIC | Facility: CLINIC | Age: 69
End: 2024-05-23

## 2024-05-23 ENCOUNTER — TELEPHONE (OUTPATIENT)
Dept: UROLOGY | Facility: AMBULATORY SURGERY CENTER | Age: 69
End: 2024-05-23

## 2024-05-23 ENCOUNTER — PREP FOR PROCEDURE (OUTPATIENT)
Dept: UROLOGY | Facility: AMBULATORY SURGERY CENTER | Age: 69
End: 2024-05-23

## 2024-05-23 DIAGNOSIS — R39.89 SUSPECTED UTI: ICD-10-CM

## 2024-05-23 DIAGNOSIS — Z01.818 PREOP EXAMINATION: ICD-10-CM

## 2024-05-23 DIAGNOSIS — R80.9 MICROALBUMINURIA: Primary | ICD-10-CM

## 2024-05-23 DIAGNOSIS — C67.0 MALIGNANT NEOPLASM OF TRIGONE OF URINARY BLADDER (HCC): Primary | ICD-10-CM

## 2024-05-23 DIAGNOSIS — Z01.810 PREOP CARDIOVASCULAR EXAM: ICD-10-CM

## 2024-05-23 DIAGNOSIS — Z01.812 PRE-PROCEDURE LAB EXAM: ICD-10-CM

## 2024-05-23 NOTE — TELEPHONE ENCOUNTER
Spoke with patient and confirmed surgery date of: 6/18/2024  Type of surgery: Cysto/TURBT w/ Gemcitabine  Operating physician: Dr. Fraire  Location of surgery: [facility]    Verbally went over prep with patient on:  5/23/2024  NPO  Bowel prep? No  Hospital calls afternoon prior with arrival time -Calls Friday afternoon for Monday surgeries  Patient needs ride to and from surgery outpatient  Pre-op testing to be done 2 weeks prior to surgery  CBC, BMP, Urine culture and EKG  Blood thinners:    no hold needed  Clearances needed: None    Mailed  to patient on: 5/23/2024  Copy of packet scanned into Media on: 5/23/2024  Labs in packet  Soap prep in packet  Pre-op & Post-op in packet      Consent: in Media    If needed:      Dr[place]:  Faxed form to pharmacy on:

## 2024-05-23 NOTE — TELEPHONE ENCOUNTER
----- Message from Lula Griffin DO sent at 5/23/2024  5:30 PM EDT -----  Trace proteinuria - repeat in 3months - order in chart -- please call to schedule f/u after urine test   Lipids stable   Nml Vit D

## 2024-05-31 NOTE — TELEPHONE ENCOUNTER
Left message per communication form advising patient of scheduling appoint with Dr. Fraire to review her biopsy results. Office number provided to confirm or deny appointment.       Tentative appointment was scheduled for July 12 at 1:15 PM at the [facility] with Dr. Fraire.

## 2024-06-04 ENCOUNTER — APPOINTMENT (OUTPATIENT)
Dept: LAB | Facility: HOSPITAL | Age: 69
End: 2024-06-04
Payer: COMMERCIAL

## 2024-06-04 ENCOUNTER — APPOINTMENT (OUTPATIENT)
Dept: LAB | Facility: HOSPITAL | Age: 69
End: 2024-06-04

## 2024-06-04 DIAGNOSIS — Z01.810 PREOP CARDIOVASCULAR EXAM: ICD-10-CM

## 2024-06-04 DIAGNOSIS — M81.0 POSTMENOPAUSAL OSTEOPOROSIS: ICD-10-CM

## 2024-06-04 DIAGNOSIS — Z01.812 PRE-PROCEDURE LAB EXAM: ICD-10-CM

## 2024-06-04 DIAGNOSIS — Z01.818 PREOP EXAMINATION: ICD-10-CM

## 2024-06-04 DIAGNOSIS — C67.0 MALIGNANT NEOPLASM OF TRIGONE OF URINARY BLADDER (HCC): ICD-10-CM

## 2024-06-04 DIAGNOSIS — R39.89 SUSPECTED UTI: ICD-10-CM

## 2024-06-04 LAB
ALBUMIN SERPL BCP-MCNC: 3.9 G/DL (ref 3.5–5)
ALP SERPL-CCNC: 100 U/L (ref 34–104)
ALT SERPL W P-5'-P-CCNC: 22 U/L (ref 7–52)
ANION GAP SERPL CALCULATED.3IONS-SCNC: 5 MMOL/L (ref 4–13)
AST SERPL W P-5'-P-CCNC: 17 U/L (ref 13–39)
ATRIAL RATE: 66 BPM
BASOPHILS # BLD AUTO: 0.03 THOUSANDS/ÂΜL (ref 0–0.1)
BASOPHILS NFR BLD AUTO: 1 % (ref 0–1)
BILIRUB SERPL-MCNC: 0.28 MG/DL (ref 0.2–1)
BUN SERPL-MCNC: 20 MG/DL (ref 5–25)
CALCIUM SERPL-MCNC: 9.1 MG/DL (ref 8.4–10.2)
CHLORIDE SERPL-SCNC: 104 MMOL/L (ref 96–108)
CO2 SERPL-SCNC: 28 MMOL/L (ref 21–32)
CREAT SERPL-MCNC: 0.82 MG/DL (ref 0.6–1.3)
EOSINOPHIL # BLD AUTO: 0.12 THOUSAND/ÂΜL (ref 0–0.61)
EOSINOPHIL NFR BLD AUTO: 3 % (ref 0–6)
ERYTHROCYTE [DISTWIDTH] IN BLOOD BY AUTOMATED COUNT: 13.6 % (ref 11.6–15.1)
GFR SERPL CREATININE-BSD FRML MDRD: 74 ML/MIN/1.73SQ M
GLUCOSE P FAST SERPL-MCNC: 95 MG/DL (ref 65–99)
HCT VFR BLD AUTO: 32 % (ref 34.8–46.1)
HGB BLD-MCNC: 10.2 G/DL (ref 11.5–15.4)
IMM GRANULOCYTES # BLD AUTO: 0.02 THOUSAND/UL (ref 0–0.2)
IMM GRANULOCYTES NFR BLD AUTO: 1 % (ref 0–2)
LYMPHOCYTES # BLD AUTO: 2.05 THOUSANDS/ÂΜL (ref 0.6–4.47)
LYMPHOCYTES NFR BLD AUTO: 46 % (ref 14–44)
MCH RBC QN AUTO: 27.8 PG (ref 26.8–34.3)
MCHC RBC AUTO-ENTMCNC: 31.9 G/DL (ref 31.4–37.4)
MCV RBC AUTO: 87 FL (ref 82–98)
MONOCYTES # BLD AUTO: 0.38 THOUSAND/ÂΜL (ref 0.17–1.22)
MONOCYTES NFR BLD AUTO: 9 % (ref 4–12)
NEUTROPHILS # BLD AUTO: 1.73 THOUSANDS/ÂΜL (ref 1.85–7.62)
NEUTS SEG NFR BLD AUTO: 40 % (ref 43–75)
NRBC BLD AUTO-RTO: 0 /100 WBCS
P AXIS: 44 DEGREES
PLATELET # BLD AUTO: 152 THOUSANDS/UL (ref 149–390)
PMV BLD AUTO: 11.4 FL (ref 8.9–12.7)
POTASSIUM SERPL-SCNC: 3.9 MMOL/L (ref 3.5–5.3)
PR INTERVAL: 164 MS
PROT SERPL-MCNC: 6.3 G/DL (ref 6.4–8.4)
QRS AXIS: 45 DEGREES
QRSD INTERVAL: 88 MS
QT INTERVAL: 420 MS
QTC INTERVAL: 440 MS
RBC # BLD AUTO: 3.67 MILLION/UL (ref 3.81–5.12)
SODIUM SERPL-SCNC: 137 MMOL/L (ref 135–147)
T WAVE AXIS: 37 DEGREES
VENTRICULAR RATE: 66 BPM
WBC # BLD AUTO: 4.33 THOUSAND/UL (ref 4.31–10.16)

## 2024-06-04 PROCEDURE — 87086 URINE CULTURE/COLONY COUNT: CPT

## 2024-06-04 PROCEDURE — 36415 COLL VENOUS BLD VENIPUNCTURE: CPT

## 2024-06-04 PROCEDURE — 80053 COMPREHEN METABOLIC PANEL: CPT

## 2024-06-04 PROCEDURE — 85025 COMPLETE CBC W/AUTO DIFF WBC: CPT

## 2024-06-04 PROCEDURE — 93010 ELECTROCARDIOGRAM REPORT: CPT | Performed by: INTERNAL MEDICINE

## 2024-06-06 LAB
BACTERIA UR CULT: ABNORMAL
BACTERIA UR CULT: ABNORMAL

## 2024-06-10 RX ORDER — MULTIVITAMIN
1 CAPSULE ORAL DAILY
COMMUNITY

## 2024-06-10 RX ORDER — UBIDECARENONE 200 MG
CAPSULE ORAL
COMMUNITY

## 2024-06-10 NOTE — PRE-PROCEDURE INSTRUCTIONS
Pre-Surgery Instructions:   Medication Instructions    acetaminophen (TYLENOL) 500 mg tablet Uses PRN- OK to take day of surgery    Ascorbic Acid (QC VITAMIN C WITH ROBERTA HIPS PO) Stop taking 7 days prior to surgery.    Calcium Carb-Cholecalciferol (OSCAL-D) 500 mg-200 units per tablet Stop taking 7 days prior to surgery.    cephalexin (KEFLEX) 500 mg capsule Continue    Cholecalciferol (VITAMIN D3) 1,000 units tablet Stop taking 7 days prior to surgery.    Co-Enzyme Q10 200 MG CAPS Stop taking 7 days prior to surgery.    estradiol (ESTRACE VAGINAL) 0.1 mg/g vaginal cream Hold day of surgery.    LORATADINE ALLERGY RELIEF PO Take day of surgery.    magnesium gluconate (MAGONATE) 500 mg tablet Stop taking 7 days prior to surgery.    Mirabegron ER (Myrbetriq) 50 MG TB24 Hold day of surgery.    Multiple Vitamin (multivitamin) capsule Stop taking 7 days prior to surgery.    OMEPRAZOLE PO Take day of surgery.   Medication instructions for day surgery reviewed. Please use only a sip of water to take your instructed medications. Avoid all over the counter vitamins, supplements and NSAIDS for one week prior to surgery per anesthesia guidelines. Tylenol is ok to take as needed.     You will receive a call one business day prior to surgery with an arrival time and hospital directions. If your surgery is scheduled on a Monday, the hospital will be calling you on the Friday prior to your surgery. If you have not heard from anyone by 8pm, please call the hospital supervisor through the hospital  at [phone]. ([place] [phone] or [place] [phone]).    Do not eat or drink anything after midnight the night before your surgery, including candy, mints, lifesavers, or chewing gum. Do not drink alcohol 24hrs before your surgery. Try not to smoke at least 24hrs before your surgery.       Follow the pre surgery showering instructions as listed in the “My Surgical Experience Booklet” or otherwise provided by your  surgeon's office. Do not use a blade to shave the surgical area 1 week before surgery. It is okay to use a clean electric clippers up to 24 hours before surgery. Do not apply any lotions, creams, including makeup, cologne, deodorant, or perfumes after showering on the day of your surgery. Do not use dry shampoo, hair spray, hair gel, or any type of hair products.     No contact lenses, eye make-up, or artificial eyelashes. Remove nail polish, including gel polish, and any artificial, gel, or acrylic nails if possible. Remove all jewelry including rings and body piercing jewelry.     Wear causal clothing that is easy to take on and off. Consider your type of surgery.    Keep any valuables, jewelry, piercings at home. Please bring any specially ordered equipment (sling, braces) if indicated.    Arrange for a responsible person to drive you to and from the hospital on the day of your surgery. Please confirm the visitor policy for the day of your procedure when you receive your phone call with an arrival time.     Call the surgeon's office with any new illnesses, exposures, or additional questions prior to surgery.    Please reference your “My Surgical Experience Booklet” for additional information to prepare for your upcoming surgery.

## 2024-06-17 ENCOUNTER — ANESTHESIA EVENT (OUTPATIENT)
Dept: PERIOP | Facility: HOSPITAL | Age: 69
End: 2024-06-17
Payer: COMMERCIAL

## 2024-06-18 ENCOUNTER — ANESTHESIA (OUTPATIENT)
Dept: PERIOP | Facility: HOSPITAL | Age: 69
End: 2024-06-18
Payer: COMMERCIAL

## 2024-06-18 ENCOUNTER — HOSPITAL ENCOUNTER (OUTPATIENT)
Facility: HOSPITAL | Age: 69
Setting detail: OUTPATIENT SURGERY
Discharge: HOME | End: 2024-06-18
Attending: UROLOGY | Admitting: UROLOGY
Payer: COMMERCIAL

## 2024-06-18 VITALS
DIASTOLIC BLOOD PRESSURE: 69 MMHG | RESPIRATION RATE: 18 BRPM | HEART RATE: 66 BPM | SYSTOLIC BLOOD PRESSURE: 141 MMHG | WEIGHT: 155 LBS | BODY MASS INDEX: 31.25 KG/M2 | OXYGEN SATURATION: 99 % | TEMPERATURE: 97.6 F | HEIGHT: 59 IN

## 2024-06-18 DIAGNOSIS — C67.0 MALIGNANT NEOPLASM OF TRIGONE OF URINARY BLADDER (HCC): ICD-10-CM

## 2024-06-18 PROBLEM — E66.9 CLASS 1 OBESITY IN ADULT: Status: ACTIVE | Noted: 2024-06-18

## 2024-06-18 PROBLEM — E66.811 CLASS 1 OBESITY IN ADULT: Status: ACTIVE | Noted: 2024-06-18

## 2024-06-18 PROCEDURE — NC001 PR NO CHARGE: Performed by: UROLOGY

## 2024-06-18 PROCEDURE — 87086 URINE CULTURE/COLONY COUNT: CPT | Performed by: UROLOGY

## 2024-06-18 PROCEDURE — 52204 CYSTOSCOPY W/BIOPSY(S): CPT | Performed by: UROLOGY

## 2024-06-18 PROCEDURE — 88305 TISSUE EXAM BY PATHOLOGIST: CPT | Performed by: PATHOLOGY

## 2024-06-18 PROCEDURE — 51720 TREATMENT OF BLADDER LESION: CPT | Performed by: UROLOGY

## 2024-06-18 RX ORDER — FENTANYL CITRATE/PF 50 MCG/ML
25 SYRINGE (ML) INJECTION
Status: DISCONTINUED | OUTPATIENT
Start: 2024-06-18 | End: 2024-06-18 | Stop reason: HOSPADM

## 2024-06-18 RX ORDER — LIDOCAINE HYDROCHLORIDE 10 MG/ML
0.5 INJECTION, SOLUTION EPIDURAL; INFILTRATION; INTRACAUDAL; PERINEURAL ONCE AS NEEDED
Status: DISCONTINUED | OUTPATIENT
Start: 2024-06-18 | End: 2024-06-18 | Stop reason: HOSPADM

## 2024-06-18 RX ORDER — ONDANSETRON 2 MG/ML
INJECTION INTRAMUSCULAR; INTRAVENOUS AS NEEDED
Status: DISCONTINUED | OUTPATIENT
Start: 2024-06-18 | End: 2024-06-18

## 2024-06-18 RX ORDER — CEPHALEXIN 500 MG/1
500 CAPSULE ORAL 3 TIMES DAILY
Qty: 9 CAPSULE | Refills: 0 | Status: SHIPPED | OUTPATIENT
Start: 2024-06-18 | End: 2024-06-21

## 2024-06-18 RX ORDER — PROPOFOL 10 MG/ML
INJECTION, EMULSION INTRAVENOUS AS NEEDED
Status: DISCONTINUED | OUTPATIENT
Start: 2024-06-18 | End: 2024-06-18

## 2024-06-18 RX ORDER — SODIUM CHLORIDE, SODIUM LACTATE, POTASSIUM CHLORIDE, CALCIUM CHLORIDE 600; 310; 30; 20 MG/100ML; MG/100ML; MG/100ML; MG/100ML
125 INJECTION, SOLUTION INTRAVENOUS CONTINUOUS
Status: DISCONTINUED | OUTPATIENT
Start: 2024-06-18 | End: 2024-06-18 | Stop reason: HOSPADM

## 2024-06-18 RX ORDER — HYDROCODONE BITARTRATE AND ACETAMINOPHEN 5; 325 MG/1; MG/1
1 TABLET ORAL EVERY 6 HOURS PRN
Qty: 5 TABLET | Refills: 0 | Status: SHIPPED | OUTPATIENT
Start: 2024-06-18 | End: 2024-06-28

## 2024-06-18 RX ORDER — LIDOCAINE HYDROCHLORIDE 10 MG/ML
INJECTION, SOLUTION EPIDURAL; INFILTRATION; INTRACAUDAL; PERINEURAL AS NEEDED
Status: DISCONTINUED | OUTPATIENT
Start: 2024-06-18 | End: 2024-06-18

## 2024-06-18 RX ORDER — CEFAZOLIN SODIUM 1 G/50ML
1000 SOLUTION INTRAVENOUS ONCE
Status: DISCONTINUED | OUTPATIENT
Start: 2024-06-18 | End: 2024-06-18 | Stop reason: HOSPADM

## 2024-06-18 RX ORDER — FENTANYL CITRATE 50 UG/ML
INJECTION, SOLUTION INTRAMUSCULAR; INTRAVENOUS AS NEEDED
Status: DISCONTINUED | OUTPATIENT
Start: 2024-06-18 | End: 2024-06-18

## 2024-06-18 RX ORDER — CEFAZOLIN SODIUM 1 G/3ML
INJECTION, POWDER, FOR SOLUTION INTRAMUSCULAR; INTRAVENOUS AS NEEDED
Status: DISCONTINUED | OUTPATIENT
Start: 2024-06-18 | End: 2024-06-18

## 2024-06-18 RX ORDER — MIDAZOLAM HYDROCHLORIDE 2 MG/2ML
INJECTION, SOLUTION INTRAMUSCULAR; INTRAVENOUS AS NEEDED
Status: DISCONTINUED | OUTPATIENT
Start: 2024-06-18 | End: 2024-06-18

## 2024-06-18 RX ORDER — DEXAMETHASONE SODIUM PHOSPHATE 10 MG/ML
INJECTION, SOLUTION INTRAMUSCULAR; INTRAVENOUS AS NEEDED
Status: DISCONTINUED | OUTPATIENT
Start: 2024-06-18 | End: 2024-06-18

## 2024-06-18 RX ORDER — HYDROCODONE BITARTRATE AND ACETAMINOPHEN 5; 325 MG/1; MG/1
1 TABLET ORAL EVERY 4 HOURS PRN
Status: DISCONTINUED | OUTPATIENT
Start: 2024-06-18 | End: 2024-06-18 | Stop reason: HOSPADM

## 2024-06-18 RX ADMIN — PROPOFOL 180 MG: 10 INJECTION, EMULSION INTRAVENOUS at 13:32

## 2024-06-18 RX ADMIN — HYDROCODONE BITARTRATE AND ACETAMINOPHEN 1 TABLET: 5; 325 TABLET ORAL at 15:47

## 2024-06-18 RX ADMIN — FENTANYL CITRATE 25 MCG: 50 INJECTION INTRAMUSCULAR; INTRAVENOUS at 13:40

## 2024-06-18 RX ADMIN — MIDAZOLAM 2 MG: 1 INJECTION INTRAMUSCULAR; INTRAVENOUS at 13:22

## 2024-06-18 RX ADMIN — ONDANSETRON 4 MG: 2 INJECTION INTRAMUSCULAR; INTRAVENOUS at 13:32

## 2024-06-18 RX ADMIN — SODIUM CHLORIDE, SODIUM LACTATE, POTASSIUM CHLORIDE, AND CALCIUM CHLORIDE 125 ML/HR: .6; .31; .03; .02 INJECTION, SOLUTION INTRAVENOUS at 12:04

## 2024-06-18 RX ADMIN — CEFAZOLIN 1000 MG: 1 INJECTION, POWDER, FOR SOLUTION INTRAMUSCULAR; INTRAVENOUS at 13:35

## 2024-06-18 RX ADMIN — DEXAMETHASONE SODIUM PHOSPHATE 10 MG: 10 INJECTION INTRAMUSCULAR; INTRAVENOUS at 13:32

## 2024-06-18 RX ADMIN — LIDOCAINE HYDROCHLORIDE 50 MG: 10 INJECTION, SOLUTION EPIDURAL; INFILTRATION; INTRACAUDAL; PERINEURAL at 13:32

## 2024-06-18 NOTE — ANESTHESIA PREPROCEDURE EVALUATION
Procedure:  TRANSURETHRAL RESECTION OF BLADDER TUMOR (TURBT), gemcitabine (Bladder)  INSTILLATION OF GEMCITABINE (Bladder)    Relevant Problems   ANESTHESIA (within normal limits)      CARDIO   (+) Essential hypertension      ENDO (within normal limits)      GI/HEPATIC   (+) GERD (gastroesophageal reflux disease)      PULMONARY   (+) Obstructive sleep apnea      Neurology/Sleep   (+) Brain tumor (benign) (HCC)      Oncology   (+) 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HCC)   (+) Atypical intracranial meningioma (HCC)   (+) Malignant neoplasm of lateral wall of urinary bladder (HCC)      Other   (+) Class 1 obesity in adult        Physical Exam    Airway    Mallampati score: III  TM Distance: >3 FB  Neck ROM: full     Dental    lower dentures and upper dentures    Cardiovascular      Pulmonary      Other Findings  post-pubertal.      Anesthesia Plan  ASA Score- 2     Anesthesia Type- general with ASA Monitors.         Additional Monitors:     Airway Plan: LMA.    Comment: ETT if surgeon requests paralysis.       Plan Factors-Exercise tolerance (METS): >4 METS.    Chart reviewed. EKG reviewed.  Existing labs reviewed. Patient summary reviewed.    Patient is not a current smoker.              Induction- intravenous.    Postoperative Plan-         Informed Consent- Anesthetic plan and risks discussed with patient.  I personally reviewed this patient with the CRNA. Discussed and agreed on the Anesthesia Plan with the CRNA..

## 2024-06-18 NOTE — H&P
"Rashmi is a 68-year-old female history of bladder papilloma versus atypia.  I removed a small polyp in August 2022.  She is now on yearly surveillance.  Her last upper tract imaging was a renal bladder ultrasound from March 2023 which revealed a 7 mm avascular nonshadowing focus on the right lateral wall of the bladder.  This ultrasound was performed just after I performed cystoscopy which showed no evidence of bladder wall abnormalities or lesions.  She denies any gross hematuria.  She returns for routine cystoscopy today.        Cystoscopy May 2024:.  Along the posterior bladder wall a small 1 to 2 cm polyp was appreciated.  This had a low-grade and superficial appearance to it.  A female office staff member was present throughout the entire procedure.    /74   Pulse 74   Temp (!) 96.9 °F (36.1 °C) (Temporal)   Resp 18   Ht 4' 11\" (1.499 m)   Wt 70.3 kg (155 lb)   SpO2 98%   BMI 31.31 kg/m²     On examination she is in no acute distress.  Cardiac is regular.  Respiratory no distress.  Abdomen is soft nontender nondistended.  Skin is warm.  Extremities without edema     Impression: History of bladder polyp/papilloma with small posterior wall recurrence     Plan: I recommend returning to the operating room for cystoscopy with bladder biopsy and fulguration along with intravesical instillation of gemcitabine.  Risk of the procedure including, not limited to bleeding, infection, sepsis, perforation, disease recurrence, need for additional treatment in the future was discussed and reviewed.  Informed consent obtained.  "

## 2024-06-18 NOTE — OP NOTE
OPERATIVE REPORT  PATIENT NAME: Rashmi Bray    :  1955  MRN: [id number]  Pt Location: [facility] ROOM 01    SURGERY DATE: 2024    Surgeons and Role:     * Radames Fraire MD - Primary    Preop Diagnosis:  Malignant neoplasm of trigone of urinary bladder (HCC) [C67.0]    Post-Op Diagnosis Codes:     * Malignant neoplasm of trigone of urinary bladder (HCC) [C67.0]    Procedure(s):  Cystoscopy, bladder biopsy, fulguration, intravesical instillation of gemcitabine    Specimen(s):  ID Type Source Tests Collected by Time Destination   1 : BLADDER TUMOR POSTERIOR WALL Tissue Urinary Bladder TISSUE EXAM Radames Fraire MD 2024 1345    A :  Urine Urine, Cystoscopic URINE CULTURE Radames Fraire MD 2024 1343        Estimated Blood Loss:   Minimal    Drains:  20 [place] catheter    Anesthesia Type:   General    Operative Indications:  Malignant neoplasm of trigone of urinary bladder (HCC) [C67.0]      Operative Findings:  Small 1-2 cm posterior bladder wall tumor removed with cold cup biopsy forceps.  Base fulgurated, 2 g of intravesical gemcitabine instilled.      Complications:   None    Procedure and Technique:  Yolette is a 68-year-old female with a history of low-grade superficial bladder cancer.  Recent office cystoscopy showed a 1 to 2 cm recurrent bladder polyp on the posterior bladder wall.  Risk and benefits of cystoscopy with bladder biopsy and fulguration along with intravesical instillation of gemcitabine were discussed and reviewed informed consent was obtained.  Risk of the procedure including, not limited to bleeding, infection, sepsis, and perforation were discussed and reviewed.  We also discussed the need for additional treatment or surgery in the future.    Patient is brought to the operating room on 2024.  After the smooth induction of general LMA anesthesia, the patient is placed in the dorsolithotomy position.  Her genitalia is prepped and  draped in sterile fashion.  Intravenous Ancef was administered.  Timeout was performed with all members the operative room confirming the patient's identity and procedure to be performed.    22 [place] rigid cystoscope with 30 degree lens was inserted.  The bladder was thoroughly inspected.  A single 1-2 cm polyp was identified along the right posterior bladder wall.  The remainder of the bladder was free and clear of mucosal abnormalities or lesions.    Cold cup biopsy forceps were utilized to sample the lesion x 3 until it was removed in its entirety.  Bugbee electrocautery was utilized to fulgurate the base.  There is no sign of perforation.  Hemostasis was excellent.  The bladder was thoroughly reinspected and there were no additional lesions identified.  20 [place] catheter was placed into the bladder and 2000 mg of intravesical gemcitabine was instilled.  A catheter plug was placed.    Overall the patient tolerated the procedure well and there were no complications.  Patient was extubated in the operating room and transferred the PACU in stable condition at the conclusion of the case.    Patient Disposition:  PACU         SIGNATURE: Radames Fraire MD  DATE: June 18, 2024  TIME: 2:00 PM

## 2024-06-18 NOTE — ANESTHESIA POSTPROCEDURE EVALUATION
Post-Op Assessment Note    CV Status:  Stable    Pain management: adequate       Mental Status:  Awake and sleepy   Hydration Status:  Euvolemic   PONV Controlled:  Controlled   Airway Patency:  Patent     Post Op Vitals Reviewed: Yes    No anethesia notable event occurred.    Staff: CRNA           /64 (06/18/24 1358)    Temp 97.6 °F (36.4 °C) (06/18/24 1358)    Pulse 70 (06/18/24 1358)   Resp 16 (06/18/24 1358)    SpO2 100 % (06/18/24 1358)

## 2024-06-19 ENCOUNTER — TELEPHONE (OUTPATIENT)
Dept: UROLOGY | Facility: CLINIC | Age: 69
End: 2024-06-19

## 2024-06-19 NOTE — TELEPHONE ENCOUNTER
Post Op Note    Rashmi Bray is a 68 y.o. female s/p TURBT performed 06/18/2024. Rashmi Bray is a patient of Dr. Dr. Fraire and is seen at the [place] office.     How would you rate your pain on a scale from 1 to 10, 10 being the worst pain ever? 4  Have you had a fever? No    Have your bowel movements been regular? Yes  Do you have any difficulty urinating? No    Do you have any other questions or concerns that I can address at this time?   Called and spoke with the patient who states she is feeling ok. Complaining of burning with urination which is expected. Advised to hydrate well with water, will help with the burning.    Patient's follow up scheduled 7/2/2024 at 330 pm at the [place] office with Dr Fraire.    Patient made aware to contact the office with any questions.

## 2024-06-20 LAB — BACTERIA UR CULT: NORMAL

## 2024-06-20 PROCEDURE — 88305 TISSUE EXAM BY PATHOLOGIST: CPT | Performed by: PATHOLOGY

## 2024-07-02 ENCOUNTER — OFFICE VISIT (OUTPATIENT)
Dept: UROLOGY | Facility: AMBULATORY SURGERY CENTER | Age: 69
End: 2024-07-02
Payer: COMMERCIAL

## 2024-07-02 ENCOUNTER — TELEPHONE (OUTPATIENT)
Dept: UROLOGY | Facility: AMBULATORY SURGERY CENTER | Age: 69
End: 2024-07-02

## 2024-07-02 VITALS
OXYGEN SATURATION: 97 % | HEART RATE: 77 BPM | SYSTOLIC BLOOD PRESSURE: 126 MMHG | BODY MASS INDEX: 32.05 KG/M2 | HEIGHT: 59 IN | WEIGHT: 159 LBS | DIASTOLIC BLOOD PRESSURE: 76 MMHG

## 2024-07-02 DIAGNOSIS — C67.2 MALIGNANT NEOPLASM OF LATERAL WALL OF URINARY BLADDER (HCC): Primary | ICD-10-CM

## 2024-07-02 PROCEDURE — 99214 OFFICE O/P EST MOD 30 MIN: CPT | Performed by: UROLOGY

## 2024-07-02 NOTE — TELEPHONE ENCOUNTER
Pt saw FT today and at checkout FT said to ask you to find pt a spot in October please. Thank you.         Provider note  Return in about 3 months (around 10/2/2024) for 3 mos with FT only with cysto .

## 2024-07-02 NOTE — ASSESSMENT & PLAN NOTE
Impression: Recurrent low-grade superficial bladder cancer with focal high-grade disease    Plan: I discussed and reviewed pathology today.  We discussed that the predominant finding was low-grade noninvasive bladder cancer.  There was a focus of high-grade disease identified but fortunately the disease is superficial.  She would fall into the intermediate risk disease category based on the amount of recurrence, however, there is not BCG available based on the nationwide shortage.  I recommend that she return in 3 months time for surveillance cystoscopy.  Patient is amenable with this plan and was instructed to call if she develops gross hematuria.

## 2024-07-02 NOTE — PROGRESS NOTES
7/2/2024    Rashmi Bray  1955  [id number]    1. Malignant neoplasm of lateral wall of urinary bladder (HCC)  Assessment & Plan:  Impression: Recurrent low-grade superficial bladder cancer with focal high-grade disease    Plan: I discussed and reviewed pathology today.  We discussed that the predominant finding was low-grade noninvasive bladder cancer.  There was a focus of high-grade disease identified but fortunately the disease is superficial.  She would fall into the intermediate risk disease category based on the amount of recurrence, however, there is not BCG available based on the nationwide shortage.  I recommend that she return in 3 months time for surveillance cystoscopy.  Patient is amenable with this plan and was instructed to call if she develops gross hematuria.       History of Present Illness  68 y.o. female with a history of low-grade superficial bladder cancer.  I recently performed office cystoscopy and found a 1 to 2 cm recurrent bladder polyp on the posterior bladder wall.  She went to the operating room on June 18, 2024.  I performed cystoscopy with cold cup biopsy forceps to remove the small lesion.  She received 2 g of intravesical gemcitabine.  She returns in follow-up today.  She is without any postbiopsy sequelae.    Pathology reveals focally high-grade noninvasive urothelial carcinoma in the background of low-grade papillary urothelial carcinoma.  Muscle is not identified in the specimen.    She has had approximately 4 low-grade superficial bladder cancers dating back to 2015 when she was initially seen by Dr. Springer.          AUA Symptom Score      Review of Systems    Past Medical History  Past Medical History:   Diagnosis Date   • 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HCC)    • Brain tumor (benign) (HCC)    • Colon polyp    • Constipation    • CPAP (continuous positive airway pressure) dependence    • GERD (gastroesophageal reflux disease)    • Hiatal hernia    • History of arteriovenous  malformation    • Liver problem     cyst removed from liver   • Sleep apnea        Past Social History  Past Surgical History:   Procedure Laterality Date   • BRAIN SURGERY     • BREAST BIOPSY Right     40 years ago   • COLONOSCOPY     • CRANIOTOMY     • CYSTOSCOPY  03/15/2023    [facility]   • EGD     • LIVER SURGERY      adenoma removed from liver   • [place] COLONOSCOPY FLX DX W/COLLJ SPEC WHEN PFRMD N/A 03/20/2017    Procedure: EGD AND COLONOSCOPY;  Surgeon: Rachid Garcia MD;  Location: [facility];  Service: Gastroenterology   • [place] CYSTO W/REMOVAL OF LESIONS SMALL N/A 12/04/2019    Procedure: TRANSURETHRAL RESECTION OF BLADDER TUMOR (TURBT);  Surgeon: Girish Springer MD;  Location: [facility];  Service: Urology   • [place] CYSTO W/REMOVAL OF LESIONS SMALL N/A 6/18/2024    Procedure: TRANSURETHRAL RESECTION OF BLADDER TUMOR (TURBT), gemcitabine;  Surgeon: Radames Fraire MD;  Location: [facility];  Service: Urology   • [place] CYSTOURETHROSCOPY WITH BIOPSY N/A 08/30/2022    Procedure: CYSTOSCOPY WITH BLADDER BIOPSY, FULGURATION;  Surgeon: Radames Fraire MD;  Location: [facility];  Service: Urology   • TUBAL LIGATION         Past Family History  Family History   Problem Relation Age of Onset   • Cancer Mother 70        lung cancer   • Lung cancer Mother    • Cancer Father 38        brain cancer   • Brain cancer Father    • Melanoma Sister    • Lung cancer Brother    • Prostate cancer Brother    • Obesity Daughter    • Diabetes Son    • Obesity Son    • Asthma Son    • Ovarian cancer Maternal Grandmother    • Breast cancer Paternal Grandmother        Past Social history  Social History     Socioeconomic History   • Marital status: /Civil Union     Spouse name: Not on file   • Number of children: Not on file   • Years of education: Not on file   • Highest education level: Not on file   Occupational History   • Not on file   Tobacco Use   • Smoking status: Former     Current packs/day: 0.00     Average  packs/day: 3.0 packs/day for 28.0 years (84.0 ttl pk-yrs)     Types: Cigarettes     Start date:      Quit date:      Years since quittin.5   • Smokeless tobacco: Never   Vaping Use   • Vaping status: Never Used   Substance and Sexual Activity   • Alcohol use: Yes     Comment: 1 or 2 drinks per month   • Drug use: No   • Sexual activity: Yes     Partners: Male     Birth control/protection: Post-menopausal, Female Sterilization   Other Topics Concern   • Not on file   Social History Narrative   • Not on file     Social Determinants of Health     Financial Resource Strain: Not on file   Food Insecurity: Not on file   Transportation Needs: Not on file   Physical Activity: Not on file   Stress: Not on file   Social Connections: Not on file   Intimate Partner Violence: Not on file   Housing Stability: Not on file       Current Medications  Current Outpatient Medications   Medication Sig Dispense Refill   • acetaminophen (TYLENOL) 500 mg tablet Take 1 tablet by mouth     • Ascorbic Acid (QC VITAMIN C WITH ROBERTA HIPS PO) Take by mouth     • B Complex-C CAPS Take 1 capsule by mouth daily     • Calcium Carb-Cholecalciferol (OSCAL-D) 500 mg-200 units per tablet Take 1 tablet by mouth 2 (two) times a day with meals     • Cholecalciferol (VITAMIN D3) 1,000 units tablet Take 1,000 Units by mouth daily     • Co-Enzyme Q10 200 MG CAPS Take by mouth     • estradiol (ESTRACE VAGINAL) 0.1 mg/g vaginal cream Insert 0.5 g into the vagina 3 (three) times a week 42.5 g 10   • LORATADINE ALLERGY RELIEF PO Take by mouth     • magnesium gluconate (MAGONATE) 500 mg tablet Take 500 mg by mouth once     • Mirabegron ER (Myrbetriq) 50 MG TB24 take 1 tablet by mouth once daily 90 tablet 1   • Multiple Vitamin (multivitamin) capsule Take 1 capsule by mouth daily     • OMEPRAZOLE PO Take by mouth     • zinc gluconate 50 mg tablet Take 50 mg by mouth daily       No current facility-administered medications for this visit.  "      Allergies  No Known Allergies    Past Medical History, Social History, Family History, medications and allergies were reviewed.    Vitals  Vitals:    07/02/24 1525   BP: 126/76   BP Location: Left arm   Patient Position: Sitting   Cuff Size: Adult   Pulse: 77   SpO2: 97%   Weight: 72.1 kg (159 lb)   Height: 4' 11\" (1.499 m)       Physical Exam    Results  No results found for: \"PSA\"  Lab Results   Component Value Date    GLUCOSE 140 05/09/2015    CALCIUM 9.1 06/04/2024     05/09/2015    K 3.9 06/04/2024    CO2 28 06/04/2024     06/04/2024    BUN 20 06/04/2024    CREATININE 0.82 06/04/2024     Lab Results   Component Value Date    WBC 4.33 06/04/2024    HGB 10.2 (L) 06/04/2024    HCT 32.0 (L) 06/04/2024    MCV 87 06/04/2024     06/04/2024       Office Urine Dip  No results found for this or any previous visit (from the past 1 hour(s)).]  "

## 2024-07-03 NOTE — TELEPHONE ENCOUNTER
Verbally confirmed the below appointment:    Date: 10/15/2024     Arrival Time 9:15 AM     Visit Type: URO CYSTO SIMPLE PROCEDURE [facility] [[id number]]     Provider: Radames Fraire MD Department: [facility]

## 2024-08-23 ENCOUNTER — APPOINTMENT (OUTPATIENT)
Dept: LAB | Facility: HOSPITAL | Age: 69
End: 2024-08-23
Payer: COMMERCIAL

## 2024-08-23 DIAGNOSIS — R80.9 MICROALBUMINURIA: ICD-10-CM

## 2024-08-23 LAB
CREAT UR-MCNC: 176 MG/DL
MICROALBUMIN UR-MCNC: 61.4 MG/L
MICROALBUMIN/CREAT 24H UR: 35 MG/G CREATININE (ref 0–30)

## 2024-08-23 PROCEDURE — 82043 UR ALBUMIN QUANTITATIVE: CPT

## 2024-08-23 PROCEDURE — 82570 ASSAY OF URINE CREATININE: CPT

## 2024-08-27 ENCOUNTER — TELEPHONE (OUTPATIENT)
Dept: FAMILY MEDICINE CLINIC | Facility: CLINIC | Age: 69
End: 2024-08-27

## 2024-08-27 NOTE — TELEPHONE ENCOUNTER
----- Message from Lula Griffin DO sent at 8/27/2024 10:10 AM EDT -----  Pt with proteinuria - please schedule OV for f/u = thanks

## 2024-09-03 ENCOUNTER — OFFICE VISIT (OUTPATIENT)
Dept: FAMILY MEDICINE CLINIC | Facility: CLINIC | Age: 69
End: 2024-09-03
Payer: COMMERCIAL

## 2024-09-03 VITALS
DIASTOLIC BLOOD PRESSURE: 80 MMHG | HEART RATE: 69 BPM | SYSTOLIC BLOOD PRESSURE: 146 MMHG | HEIGHT: 59 IN | BODY MASS INDEX: 32.05 KG/M2 | WEIGHT: 159 LBS | RESPIRATION RATE: 16 BRPM | OXYGEN SATURATION: 100 %

## 2024-09-03 DIAGNOSIS — D33.2 BENIGN NEOPLASM OF BRAIN, UNSPECIFIED BRAIN REGION (HCC): ICD-10-CM

## 2024-09-03 DIAGNOSIS — R80.9 MICROALBUMINURIA: ICD-10-CM

## 2024-09-03 DIAGNOSIS — C67.2 MALIGNANT NEOPLASM OF LATERAL WALL OF URINARY BLADDER (HCC): ICD-10-CM

## 2024-09-03 DIAGNOSIS — D42.0 ATYPICAL INTRACRANIAL MENINGIOMA (HCC): ICD-10-CM

## 2024-09-03 DIAGNOSIS — I10 ESSENTIAL HYPERTENSION: Primary | ICD-10-CM

## 2024-09-03 PROCEDURE — 99214 OFFICE O/P EST MOD 30 MIN: CPT | Performed by: FAMILY MEDICINE

## 2024-09-03 RX ORDER — LISINOPRIL 2.5 MG/1
2.5 TABLET ORAL DAILY
Qty: 30 TABLET | Refills: 1 | Status: SHIPPED | OUTPATIENT
Start: 2024-09-03

## 2024-09-03 NOTE — PROGRESS NOTES
"Assessment/Plan:   Diagnoses and all orders for this visit:    Essential hypertension  -     lisinopril (ZESTRIL) 2.5 mg tablet; Take 1 tablet (2.5 mg total) by mouth daily  -     Albumin / creatinine urine ratio; Future  -     Basic metabolic panel; Future  Microalbuminuria  -     lisinopril (ZESTRIL) 2.5 mg tablet; Take 1 tablet (2.5 mg total) by mouth daily  -     Albumin / creatinine urine ratio; Future  -     Basic metabolic panel; Future  - BP elevated x2  - (+) microalbuminuria   - noted to have stable BP at Uro office   - caution with NSAIDs  - limit Na to 2g/day   - UTD with PCV20   - will start on low dose ACEI - 2.5mg QD   - RTO in 5wks, with repeat labs, for BP check - pt and spouse aware and agreeable     Malignant neoplasm of lateral wall of urinary bladder (HCC)  - follows with Uro - \"predominant finding was low-grade noninvasive bladder cancer. There was a focus of high-grade disease identified but fortunately the disease is superficial...recommend that she return in 3 months time for surveillance cystoscopy\"    Atypical intracranial meningioma (HCC)  Benign neoplasm of brain, unspecified brain region (HCC)  - has to schedule 5yr f/u with NeuroSurg and get MRI prior            Subjective:    Patient ID: Rashmi Bray is a 68 y.o. female.  HPI  69yo F presents to the office with her  for f/u   - BP elevated x2 in the office   - (+) microalbuminuria   - noted to have stable BP at Uro office   - denies F/C/N/V/CP/palpitations/SOB/wheezing/abd pain/LE edema       The following portions of the patient's history were reviewed and updated as appropriate: allergies, current medications, past family history, past medical history, past social history, past surgical history and problem list.    Review of Systems  as per HPI    Objective:  /80 (BP Location: Right arm, Patient Position: Sitting, Cuff Size: Standard)   Pulse 69   Resp 16   Ht 4' 11\" (1.499 m)   Wt 72.1 kg (159 lb)   SpO2 100%   " BMI 32.11 kg/m²    Physical Exam  Vitals reviewed.   Constitutional:       General: She is not in acute distress.     Appearance: Normal appearance. She is not ill-appearing, toxic-appearing or diaphoretic.   HENT:      Head: Normocephalic and atraumatic.      Right Ear: External ear normal.      Left Ear: External ear normal.      Nose: Nose normal.   Eyes:      General: No scleral icterus.        Right eye: No discharge.         Left eye: No discharge.      Extraocular Movements: Extraocular movements intact.      Conjunctiva/sclera: Conjunctivae normal.   Cardiovascular:      Rate and Rhythm: Normal rate and regular rhythm.      Heart sounds: Normal heart sounds.   Pulmonary:      Effort: Pulmonary effort is normal.      Breath sounds: Normal breath sounds.   Abdominal:      Palpations: Abdomen is soft.   Musculoskeletal:         General: Normal range of motion.      Right lower leg: No edema.      Left lower leg: No edema.   Skin:     General: Skin is warm.   Neurological:      General: No focal deficit present.      Mental Status: She is alert and oriented to person, place, and time.   Psychiatric:         Mood and Affect: Mood normal.         Behavior: Behavior normal.

## 2024-09-04 ENCOUNTER — TELEPHONE (OUTPATIENT)
Dept: ADMINISTRATIVE | Facility: HOSPITAL | Age: 69
End: 2024-09-04

## 2024-09-04 NOTE — TELEPHONE ENCOUNTER
Hi Dr Griffin!     I reached out to pt to get her CT lung scan scheduled. Patient informed me that she previously had it scheduled but the department stated she does not qualify due to the fact she quit smoking over 15 years ago.     Is there another type of scan we can place for pt to check for lung caner if she does not qualify for this exam?

## 2024-09-28 ENCOUNTER — APPOINTMENT (OUTPATIENT)
Dept: LAB | Facility: HOSPITAL | Age: 69
End: 2024-09-28
Attending: INTERNAL MEDICINE
Payer: COMMERCIAL

## 2024-09-28 DIAGNOSIS — R80.9 MICROALBUMINURIA: ICD-10-CM

## 2024-09-28 DIAGNOSIS — M81.0 POSTMENOPAUSAL OSTEOPOROSIS: ICD-10-CM

## 2024-09-28 DIAGNOSIS — I10 ESSENTIAL HYPERTENSION: ICD-10-CM

## 2024-09-28 LAB
ANION GAP SERPL CALCULATED.3IONS-SCNC: 5 MMOL/L (ref 4–13)
BUN SERPL-MCNC: 17 MG/DL (ref 5–25)
CALCIUM SERPL-MCNC: 9.1 MG/DL (ref 8.4–10.2)
CHLORIDE SERPL-SCNC: 103 MMOL/L (ref 96–108)
CO2 SERPL-SCNC: 29 MMOL/L (ref 21–32)
CREAT SERPL-MCNC: 0.73 MG/DL (ref 0.6–1.3)
CREAT UR-MCNC: 15.2 MG/DL
GFR SERPL CREATININE-BSD FRML MDRD: 86 ML/MIN/1.73SQ M
GLUCOSE P FAST SERPL-MCNC: 96 MG/DL (ref 65–99)
MICROALBUMIN UR-MCNC: <7 MG/L
POTASSIUM SERPL-SCNC: 4 MMOL/L (ref 3.5–5.3)
SODIUM SERPL-SCNC: 137 MMOL/L (ref 135–147)

## 2024-09-28 PROCEDURE — 82570 ASSAY OF URINE CREATININE: CPT

## 2024-09-28 PROCEDURE — 80048 BASIC METABOLIC PNL TOTAL CA: CPT

## 2024-09-28 PROCEDURE — 36415 COLL VENOUS BLD VENIPUNCTURE: CPT

## 2024-09-28 PROCEDURE — 82043 UR ALBUMIN QUANTITATIVE: CPT

## 2024-09-30 DIAGNOSIS — R35.0 URINARY FREQUENCY: ICD-10-CM

## 2024-09-30 RX ORDER — MIRABEGRON 50 MG/1
1 TABLET, EXTENDED RELEASE ORAL DAILY
Qty: 90 TABLET | Refills: 1 | Status: SHIPPED | OUTPATIENT
Start: 2024-09-30

## 2024-10-08 ENCOUNTER — OFFICE VISIT (OUTPATIENT)
Dept: FAMILY MEDICINE CLINIC | Facility: CLINIC | Age: 69
End: 2024-10-08
Payer: COMMERCIAL

## 2024-10-08 VITALS
WEIGHT: 159 LBS | BODY MASS INDEX: 32.05 KG/M2 | SYSTOLIC BLOOD PRESSURE: 122 MMHG | HEIGHT: 59 IN | OXYGEN SATURATION: 98 % | DIASTOLIC BLOOD PRESSURE: 76 MMHG | HEART RATE: 72 BPM

## 2024-10-08 DIAGNOSIS — M72.2 PLANTAR FASCIITIS, RIGHT: ICD-10-CM

## 2024-10-08 DIAGNOSIS — D64.9 LOW HEMOGLOBIN: ICD-10-CM

## 2024-10-08 DIAGNOSIS — D42.0 ATYPICAL INTRACRANIAL MENINGIOMA (HCC): ICD-10-CM

## 2024-10-08 DIAGNOSIS — R80.9 MICROALBUMINURIA: ICD-10-CM

## 2024-10-08 DIAGNOSIS — Z12.2 SCREENING FOR LUNG CANCER: ICD-10-CM

## 2024-10-08 DIAGNOSIS — Z87.891 FORMER SMOKER: ICD-10-CM

## 2024-10-08 DIAGNOSIS — Z23 ENCOUNTER FOR IMMUNIZATION: ICD-10-CM

## 2024-10-08 DIAGNOSIS — R53.83 OTHER FATIGUE: ICD-10-CM

## 2024-10-08 DIAGNOSIS — C67.2 MALIGNANT NEOPLASM OF LATERAL WALL OF URINARY BLADDER (HCC): ICD-10-CM

## 2024-10-08 DIAGNOSIS — I10 ESSENTIAL HYPERTENSION: Primary | ICD-10-CM

## 2024-10-08 PROCEDURE — 90471 IMMUNIZATION ADMIN: CPT | Performed by: FAMILY MEDICINE

## 2024-10-08 PROCEDURE — 99214 OFFICE O/P EST MOD 30 MIN: CPT | Performed by: FAMILY MEDICINE

## 2024-10-08 PROCEDURE — 90662 IIV NO PRSV INCREASED AG IM: CPT | Performed by: FAMILY MEDICINE

## 2024-10-08 RX ORDER — LISINOPRIL 2.5 MG/1
2.5 TABLET ORAL DAILY
Qty: 90 TABLET | Refills: 1 | Status: SHIPPED | OUTPATIENT
Start: 2024-10-08

## 2024-10-08 NOTE — PROGRESS NOTES
Assessment/Plan:   Diagnoses and all orders for this visit:    Essential hypertension  -     lisinopril (ZESTRIL) 2.5 mg tablet; Take 1 tablet (2.5 mg total) by mouth daily  Microalbuminuria  -     lisinopril (ZESTRIL) 2.5 mg tablet; Take 1 tablet (2.5 mg total) by mouth daily  - BP in the office 122/76  - nml urine microalbumin   - stable renal function   - cont ACEI 2.5mg QD   - caution with NSAIDs  - limit Na to 2g/day   - UTD with PCV20   - received annual Flu vaccine in the office today   - RTO in 6months, with labs, for f/u and annual exam - pt and spouse aware and agreeable   Encounter for immunization  -     influenza vaccine, high-dose, PF 0.5 mL (Fluzone High Dose)    Malignant neoplasm of lateral wall of urinary bladder (HCC)  - follows with Uro     Atypical intracranial meningioma (HCC)  - has to schedule 5yr f/u with NeuroSurg and get MRI prior     Other fatigue  -     Iron Panel (Includes Ferritin, Iron Sat%, Iron, and TIBC); Future  -     CBC and differential  Low hemoglobin  -     Iron Panel (Includes Ferritin, Iron Sat%, Iron, and TIBC); Future  -     CBC and differential  - (+) fatigue - noted to have low Hb (10.2) in 6/2024 - no repeat labs   - last Cscope in 2022 - diverticulosis - 10yr recall   - will recheck labs     Plantar fasciitis, right  -     Ambulatory Referral to Physical Therapy; Future    Former smoker  Screening for lung cancer  -     CT lung screening program; Future          Subjective:    Patient ID: Rashmi Bray is a 68 y.o. female.  HPI  69yo F presents to the office with her  for f/u   - BP in the office 122/76   - on ACEI 2.5mg QD for isaac-protection and tolerating it well   - repeat urine microalbumin within range  - (+) fatigue - noted to have low Hb (10.2) in 6/2024 - no repeat labs   - last Cscope in 2022 - diverticulosis - 10yr recall   - needs RF for PT for plantar fasciitis   - overdue for Lung Ca screening given h/o smoking and h/o bladder ca   - denies  "F/C/N/V/CP/palpitations/SOB/wheezing/abd pain/LE edema   - interested in getting annual Flu vaccine in office today       The following portions of the patient's history were reviewed and updated as appropriate: allergies, current medications, past family history, past medical history, past social history, past surgical history and problem list.    Review of Systems  as per HPI    Objective:  /76   Pulse 72   Ht 4' 11\" (1.499 m)   Wt 72.1 kg (159 lb)   SpO2 98%   BMI 32.11 kg/m²    Physical Exam  Vitals reviewed.   Constitutional:       General: She is not in acute distress.     Appearance: Normal appearance. She is not ill-appearing, toxic-appearing or diaphoretic.   HENT:      Head: Normocephalic and atraumatic.      Right Ear: External ear normal.      Left Ear: External ear normal.      Nose: Nose normal.   Eyes:      General: No scleral icterus.        Right eye: No discharge.         Left eye: No discharge.      Extraocular Movements: Extraocular movements intact.      Conjunctiva/sclera: Conjunctivae normal.   Cardiovascular:      Rate and Rhythm: Normal rate and regular rhythm.      Heart sounds: Normal heart sounds.   Pulmonary:      Effort: Pulmonary effort is normal.      Breath sounds: Normal breath sounds.   Abdominal:      Palpations: Abdomen is soft.   Musculoskeletal:         General: Normal range of motion.      Right lower leg: No edema.      Left lower leg: No edema.   Skin:     General: Skin is warm.   Neurological:      General: No focal deficit present.      Mental Status: She is alert and oriented to person, place, and time.   Psychiatric:         Mood and Affect: Mood normal.         Behavior: Behavior normal.         "

## 2024-10-15 ENCOUNTER — PROCEDURE VISIT (OUTPATIENT)
Dept: UROLOGY | Facility: AMBULATORY SURGERY CENTER | Age: 69
End: 2024-10-15
Payer: COMMERCIAL

## 2024-10-15 VITALS
SYSTOLIC BLOOD PRESSURE: 138 MMHG | HEART RATE: 65 BPM | HEIGHT: 59 IN | WEIGHT: 156 LBS | BODY MASS INDEX: 31.45 KG/M2 | DIASTOLIC BLOOD PRESSURE: 82 MMHG | OXYGEN SATURATION: 98 %

## 2024-10-15 DIAGNOSIS — R35.0 URINARY FREQUENCY: Primary | ICD-10-CM

## 2024-10-15 DIAGNOSIS — C67.2 MALIGNANT NEOPLASM OF LATERAL WALL OF URINARY BLADDER (HCC): ICD-10-CM

## 2024-10-15 PROCEDURE — 52000 CYSTOURETHROSCOPY: CPT | Performed by: UROLOGY

## 2024-10-15 PROCEDURE — 81002 URINALYSIS NONAUTO W/O SCOPE: CPT | Performed by: UROLOGY

## 2024-10-15 NOTE — PROGRESS NOTES
Cystoscopy     Date/Time  10/15/2024 9:30 AM     Performed by  Radames Fraire MD   Authorized by  Radames Fraire MD         Procedure Details:  Procedure type: cystoscopy      Mabel is a 68-year-old female with a history of low-grade superficial bladder cancer.  She recently went to the operating room in June 2024.  She underwent bladder biopsy with mitomycin.  Pathology was most consistent with noninvasive urothelial carcinoma with a foci of high-grade disease in a background of low-grade urothelial carcinoma.  Muscle was not identified in the specimen.  She presents today for short interval cystoscopy.      Male cystoscopy procedure note:  Risk and benefits of flexible cystoscopy were discussed. Informed consent was obtained. The patient was placed in the supine position. His genitalia was prepped and draped in a sterile fashion. Viscous lidocaine jelly was instilled into the urethra and flexible cystoscopy was then performed. The urethra, prostatic urethra, and bladder were all thoroughly inspected there was no evidence of mucosal abnormalities or lesions. Both ureteral orifices were visualized with clear efflux of urine.  A scar along the posterior bladder wall was appreciated consistent with her prior biopsy.  There was no evidence of recurrence.  Retroflexion was normal. Overall this was a negative cystoscopy     Impression: History of primarily low-grade superficial bladder cancer with focal high-grade histology, status post cystoscopy with bladder biopsy and gemcitabine, no evidence of recurrent disease on office cystoscopy today    Plan: I provided the patient and  with reassurance that there is no evidence of recurrent disease on office cystoscopy today.  Surveillance cystoscopy in 6 months time is recommended.  Urine cytology will be obtained as well.

## 2024-10-17 ENCOUNTER — OFFICE VISIT (OUTPATIENT)
Dept: RHEUMATOLOGY | Facility: CLINIC | Age: 69
End: 2024-10-17
Payer: COMMERCIAL

## 2024-10-17 VITALS
WEIGHT: 160.6 LBS | OXYGEN SATURATION: 100 % | HEART RATE: 71 BPM | SYSTOLIC BLOOD PRESSURE: 118 MMHG | BODY MASS INDEX: 32.38 KG/M2 | HEIGHT: 59 IN | DIASTOLIC BLOOD PRESSURE: 62 MMHG

## 2024-10-17 DIAGNOSIS — M81.0 AGE-RELATED OSTEOPOROSIS WITHOUT CURRENT PATHOLOGICAL FRACTURE: Primary | ICD-10-CM

## 2024-10-17 DIAGNOSIS — K21.9 GASTROESOPHAGEAL REFLUX DISEASE WITHOUT ESOPHAGITIS: ICD-10-CM

## 2024-10-17 DIAGNOSIS — Z79.83 LONG TERM USE OF BISPHOSPHONATES: ICD-10-CM

## 2024-10-17 PROCEDURE — 99214 OFFICE O/P EST MOD 30 MIN: CPT | Performed by: INTERNAL MEDICINE

## 2024-10-17 NOTE — PROGRESS NOTES
Ambulatory Visit  Name: Rashmi Bray      : 1955      MRN: [id number]  Encounter Provider: Andreina Wang MD  Encounter Date: 10/17/2024   Encounter department: [facility] [place]    Assessment & Plan  Age-related osteoporosis without current pathological fracture  Ms. Bray is a 68-year-old female with history significant for postmenopausal osteoporosis and GERD who presents for a follow up.  She did not start osteoporosis treatment yet.     - Rashmi presents today for a follow up of postmenopausal osteoporosis that was initially detected on a DEXA scan in 2018.  She has not been started on treatment thus far.  In view of the significant T-scores I recommend initiating antiresorptive treatment and as an initial option I will plan to start her on IV zoledronic acid infusions on an annual basis.  Oral bisphosphonates will be avoided due to her history of GERD.  I reviewed the side effects with her including but not limited to risk for arthralgias, electrolyte abnormalities, osteonecrosis of the jaw and atypical femoral fractures.  I encouraged her to continue calcium supplements once daily and Vitamin D 1000 IU once daily.  I encouraged her to perform weightbearing exercises.  A DEXA scan can be repeated every 1 to 2 years after she starts treatment to monitor for an improvement.    Orders:    Protein electrophoresis, serum; Future    Long term use of bisphosphonates    Orders:    Comprehensive metabolic panel; Future    Gastroesophageal reflux disease without esophagitis           History of Present Illness     INITIAL VISIT NOTE (10/2023):  Ms. Bray is a 67-year-old female with history significant for postmenopausal osteoporosis and GERD who presents for an evaluation and treatment.  She is referred by Dr. Griffin for a rheumatology consult.     Patient reports based on the DEXA scan done in 2022 she was informed of the diagnosis of osteoporosis based on the lumbar spine.   A DEXA scan done prior to this in 2018 also showed osteoporosis but she was unaware of the results and thought she had osteopenia.  The recent study showed a 5% decrease in the total bone mineral density of the lumbar spine with no significant change of the bilateral hips.  The 10-year risk of hip fracture was 2% with a 10-year risk of major osteoporotic fracture of 21%.  She has not been started on medications yet.  She does take calcium supplements 600 mg once daily and vitamin D 2 tablets daily but she is unsure of the dose.  No specific weightbearing exercises that she performs.  She attained menopause in her early 40s.  She smoked from the ages of 16 till 42+.  She drinks alcohol socially.  No long-term steroid use.  No history of an inflammatory arthritis, chronic kidney disease or thyroid disease.  She reports a history of osteoporosis in her sister and although her mother was not formally diagnosed she had bilateral hip fractures.      10/17/2024:  Patient presents for a follow-up of osteoporosis.  At our last visit we discussed starting zoledronic acid infusions but she was unable to do this.  She does not report any significant new complaints during the year.  No interim fractures.  She is taking over-the-counter calcium supplements once daily and vitamin D 1000 IU once daily.  She performs walking as a form of exercise.        Review of Systems  Constitutional: Negative for weight change, fevers, chills, night sweats, fatigue.  ENT/Mouth: Negative for hearing changes, ear pain, nasal congestion, sinus pain, hoarseness, sore throat, rhinorrhea, swallowing difficulty.   Eyes: Negative for pain, redness, discharge, vision changes.   Cardiovascular: Negative for chest pain, SOB, palpitations.   Respiratory: Negative for cough, sputum, wheezing, dyspnea.   Gastrointestinal: Negative for nausea, vomiting, diarrhea, constipation, pain, heartburn.  Genitourinary: Negative for dysuria, urinary frequency,  hematuria.   Musculoskeletal: As per HPI.  Skin: Negative for skin rash, color changes.   Neuro: Negative for weakness, numbness, tingling, loss of consciousness.   Psych: Negative for anxiety, depression.   Heme/Lymph: Negative for easy bruising, bleeding, lymphadenopathy.      Past Medical History   Past Medical History:   Diagnosis Date    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HCC)     Brain tumor (benign) (HCC)     Colon polyp     Constipation     CPAP (continuous positive airway pressure) dependence     GERD (gastroesophageal reflux disease)     Hiatal hernia     History of arteriovenous malformation     Liver problem     cyst removed from liver    Sleep apnea      Past Surgical History:   Procedure Laterality Date    BRAIN SURGERY      BREAST BIOPSY Right     40 years ago    COLONOSCOPY      CRANIOTOMY      CYSTOSCOPY  03/15/2023    [facility]    EGD      LIVER SURGERY      adenoma removed from liver    MA COLONOSCOPY FLX DX W/COLLJ SPEC WHEN PFRMD N/A 03/20/2017    Procedure: EGD AND COLONOSCOPY;  Surgeon: Rachid Garcia MD;  Location: [facility];  Service: Gastroenterology    MA CYSTO W/REMOVAL OF LESIONS SMALL N/A 12/04/2019    Procedure: TRANSURETHRAL RESECTION OF BLADDER TUMOR (TURBT);  Surgeon: Girish Springer MD;  Location: [facility];  Service: Urology    MA CYSTO W/REMOVAL OF LESIONS SMALL N/A 6/18/2024    Procedure: TRANSURETHRAL RESECTION OF BLADDER TUMOR (TURBT), gemcitabine;  Surgeon: Radames Fraire MD;  Location: [facility];  Service: Urology    MA CYSTOURETHROSCOPY WITH BIOPSY N/A 08/30/2022    Procedure: CYSTOSCOPY WITH BLADDER BIOPSY, FULGURATION;  Surgeon: Radames Fraire MD;  Location: [facility];  Service: Urology    TUBAL LIGATION       Family History   Problem Relation Age of Onset    Cancer Mother 70        lung cancer    Lung cancer Mother     Cancer Father 38        brain cancer    Brain cancer Father     Melanoma Sister     Lung cancer Brother     Prostate cancer Brother     Obesity  Daughter     Diabetes Son     Obesity Son     Asthma Son     Ovarian cancer Maternal Grandmother     Breast cancer Paternal Grandmother      Current Outpatient Medications on File Prior to Visit   Medication Sig Dispense Refill    acetaminophen (TYLENOL) 500 mg tablet Take 1 tablet by mouth      Ascorbic Acid (QC VITAMIN C WITH ROBERTA HIPS PO) Take by mouth      B Complex-C CAPS Take 1 capsule by mouth daily      Calcium Carb-Cholecalciferol (OSCAL-D) 500 mg-200 units per tablet Take 1 tablet by mouth 2 (two) times a day with meals      Cholecalciferol (VITAMIN D3) 1,000 units tablet Take 1,000 Units by mouth daily      Co-Enzyme Q10 200 MG CAPS Take by mouth      estradiol (ESTRACE VAGINAL) 0.1 mg/g vaginal cream Insert 0.5 g into the vagina 3 (three) times a week 42.5 g 10    lisinopril (ZESTRIL) 2.5 mg tablet Take 1 tablet (2.5 mg total) by mouth daily 90 tablet 1    LORATADINE ALLERGY RELIEF PO Take by mouth      magnesium gluconate (MAGONATE) 500 mg tablet Take 500 mg by mouth once      Mirabegron ER 50 MG TB24 take 1 tablet by mouth once daily 90 tablet 1    Multiple Vitamin (multivitamin) capsule Take 1 capsule by mouth daily      OMEPRAZOLE PO Take by mouth      zinc gluconate 50 mg tablet Take 50 mg by mouth daily       No current facility-administered medications on file prior to visit.   No Known Allergies   Current Outpatient Medications on File Prior to Visit   Medication Sig Dispense Refill    acetaminophen (TYLENOL) 500 mg tablet Take 1 tablet by mouth      Ascorbic Acid (QC VITAMIN C WITH ROBERTA HIPS PO) Take by mouth      B Complex-C CAPS Take 1 capsule by mouth daily      Calcium Carb-Cholecalciferol (OSCAL-D) 500 mg-200 units per tablet Take 1 tablet by mouth 2 (two) times a day with meals      Cholecalciferol (VITAMIN D3) 1,000 units tablet Take 1,000 Units by mouth daily      Co-Enzyme Q10 200 MG CAPS Take by mouth      estradiol (ESTRACE VAGINAL) 0.1 mg/g vaginal cream Insert 0.5 g into the vagina  "3 (three) times a week 42.5 g 10    lisinopril (ZESTRIL) 2.5 mg tablet Take 1 tablet (2.5 mg total) by mouth daily 90 tablet 1    LORATADINE ALLERGY RELIEF PO Take by mouth      magnesium gluconate (MAGONATE) 500 mg tablet Take 500 mg by mouth once      Mirabegron ER 50 MG TB24 take 1 tablet by mouth once daily 90 tablet 1    Multiple Vitamin (multivitamin) capsule Take 1 capsule by mouth daily      OMEPRAZOLE PO Take by mouth      zinc gluconate 50 mg tablet Take 50 mg by mouth daily       No current facility-administered medications on file prior to visit.      Social History     Tobacco Use    Smoking status: Former     Current packs/day: 0.00     Average packs/day: 3.0 packs/day for 28.0 years (84.0 ttl pk-yrs)     Types: Cigarettes     Start date:      Quit date:      Years since quittin.8    Smokeless tobacco: Never   Vaping Use    Vaping status: Never Used   Substance and Sexual Activity    Alcohol use: Yes     Comment: 1 or 2 drinks per month    Drug use: No    Sexual activity: Yes     Partners: Male     Birth control/protection: Post-menopausal, Female Sterilization         Objective     /62 (BP Location: Left arm, Patient Position: Sitting, Cuff Size: Adult)   Pulse 71   Ht 4' 11\" (1.499 m)   Wt 72.8 kg (160 lb 9.6 oz)   SpO2 100%   BMI 32.44 kg/m²     Physical Exam  General: Well appearing, well nourished, in no distress. Oriented x 3, normal mood and affect.  Ambulating without difficulty.  Skin: Good turgor, no rash, unusual bruising or prominent lesions.  Hair: Normal texture and distribution.  HEENT:  Head: Normocephalic, atraumatic.  Eyes: Conjunctiva clear, sclera non-icteric, EOM intact.  Nose: No external lesions.  Neck: Supple.  Neurologic: Alert and oriented. No focal neurological deficits appreciated.   Psychiatric: Normal mood and affect.       "

## 2024-10-17 NOTE — ASSESSMENT & PLAN NOTE
Ms. Bray is a 68-year-old female with history significant for postmenopausal osteoporosis and GERD who presents for a follow up.  She did not start osteoporosis treatment yet.     - Rashmi presents today for a follow up of postmenopausal osteoporosis that was initially detected on a DEXA scan in 2018.  She has not been started on treatment thus far.  In view of the significant T-scores I recommend initiating antiresorptive treatment and as an initial option I will plan to start her on IV zoledronic acid infusions on an annual basis.  Oral bisphosphonates will be avoided due to her history of GERD.  I reviewed the side effects with her including but not limited to risk for arthralgias, electrolyte abnormalities, osteonecrosis of the jaw and atypical femoral fractures.  I encouraged her to continue calcium supplements once daily and Vitamin D 1000 IU once daily.  I encouraged her to perform weightbearing exercises.  A DEXA scan can be repeated every 1 to 2 years after she starts treatment to monitor for an improvement.    Orders:    Protein electrophoresis, serum; Future

## 2024-10-24 ENCOUNTER — EVALUATION (OUTPATIENT)
Dept: PHYSICAL THERAPY | Facility: CLINIC | Age: 69
End: 2024-10-24
Payer: COMMERCIAL

## 2024-10-24 DIAGNOSIS — M72.2 PLANTAR FASCIITIS, RIGHT: ICD-10-CM

## 2024-10-24 PROCEDURE — 97110 THERAPEUTIC EXERCISES: CPT

## 2024-10-24 PROCEDURE — 97161 PT EVAL LOW COMPLEX 20 MIN: CPT

## 2024-10-24 NOTE — PROGRESS NOTES
PT Evaluation     Today's date: 10/24/2024  Patient name: Rashmi Bray  : 1955  MRN: [id number]  Referring provider: Lula Griffin DO  Dx:   Encounter Diagnosis     ICD-10-CM    1. Plantar fasciitis, right  M72.2 Ambulatory Referral to Physical Therapy                     Assessment  Impairments: abnormal gait, abnormal or restricted ROM, impaired balance, impaired physical strength, lacks appropriate home exercise program, pain with function and weight-bearing intolerance  Symptom irritability: moderate    Assessment details: Rashmi Bray is a 68 y.o. female who presents with R foot pain and occasional L foot pain, decreased LE strength, decreased toe and forefoot ROM, ambulatory dysfunction, and balance dysfunction. Due to these impairments, patient has difficulty performing ADL's, recreational activities, ambulation, stair negotiation. Patient's clinical presentation is consistent with their referring diagnosis of Plantar fasciitis, right. Patient has been educated in home exercise program and plan of care. Patient would benefit from skilled physical therapy services to address their aforementioned functional limitations and progress towards prior level of function and independence with home exercise program.         Goals  Short Term Goal: 4 weeks   STG1: Pt will dem ability to perform great toe and lesser toe extension independently of each other to dem improved foot intrinsic strength   STG2: Pt will be independent with HEP to maximize progress between therapy sessions  STG3: Pt will demo 1/2 MMT grade increase in LE strength to improve stair negotiation and ADLs     Long Term Goals: 12 weeks   LTG1: Pt will demo SLS of 45 sec or greater with </= min sway to demo improved SL stability   LTG2: Pt will demo LE strength WNL for reciprocal stair negotiation   LTG3: Pt will return to walking pain free per PLOF to improve functional independence       Plan  Patient would benefit from: PT eval and skilled  physical therapy  Planned modality interventions: cryotherapy and thermotherapy: hydrocollator packs    Planned therapy interventions: balance/weight bearing training, functional ROM exercises, gait training, home exercise program, therapeutic exercise, therapeutic activities, stretching, strengthening, neuromuscular re-education, Prince taping, manual therapy, joint mobilization, patient education, IASTM and kinesiology taping    Frequency: 1-2x week  Plan of Care beginning date: 10/24/2024  Plan of Care expiration date: 2025  Treatment plan discussed with: patient  Plan details: HEP development, stretching, strengthening, A/AA/PROM, joint mobilizations, posture education, STM/MI as needed to reduce muscle tension, muscle reeducation, PLOC discussed and agreed upon with patient    Due to co-pay patient will be seen every other week.       Subjective Evaluation    History of Present Illness  Mechanism of injury: Patient reports to PT with complaints of R foot pain. Recently her L foot was also starting to hurt her. Patient reports she saw her doctor for the pain back in may but it had been bothering her for a few months before that. Patient reports if she has been sitting for a while then she gets some pain, but also gets random cramping. Patient reports her new shoes bothered her foot and she can bring them next visit for us to look at. PT edu that sometimes it can take a while to break in new shoes but she should start with short duration use and gradually increase time. When patient reports when kneeling she gets a strong pull/ripping feeling in the arche of her foot.   Patient Goals  Patient goals for therapy: decreased pain, increased motion, increased strength, independence with ADLs/IADLs and return to sport/leisure activities    Pain  Current pain ratin  At best pain ratin  At worst pain ratin  Location: R foot  Quality: tight and sharp  Exacerbated by: after sitting for a prolonged or  "after sleeping.    Social Support  Stairs in house: yes   Lives in: multiple-level home  Lives with: spouse    Employment status: not working    Diagnostic Tests  No diagnostic tests performed        Objective    AROM:    R  L  Ankle DF    6  8  Ankle PF    WNL  WNL  Ankle inversion  35  35  Ankle eversion  30  28    STRENGTH:    R  L    Ankle DF   4+/5  4+/5  Ankle PF   5/5  5/5  Ankle inversion  4/5  4+/5  Ankle eversoin  4/5  4+/5    Knee ext   4/5  4/5  Knee flex   4/5  4/5  Hip abduction   4+/5  4+/5  Hip flexion   4+/5  4+/5  Hip ER    4/5  4+/5  Hip IR     4/5  4+/5      Flexibility:      Joint Mobility:   WNL talocrural jt (DF/PF)  WNL subtalar jt (inv/evr)   Mod villalobos in forefoot motion noted     Tenderness/Palpation: Mild TTP in B/L plantar fascia     Balance: increased ankle strategies noted with all balance testing  Tandem R posterior: 30 sec mod sway   Tandem L posterior:  30 sec min sway   SLS R: 15 sec LOB able to recover   SLS L: 12 sec LOB able to recover       Function:  Gait: mild antalgic gait pattern   Standing: noted hyperpronation of B/L Feet (R>L)   Step up/down: TBA           Precautions:   Past Medical History:   Diagnosis Date    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HCC)     Brain tumor (benign) (HCC)     Colon polyp     Constipation     CPAP (continuous positive airway pressure) dependence     GERD (gastroesophageal reflux disease)     Hiatal hernia     History of arteriovenous malformation     Liver problem     cyst removed from liver    Sleep apnea      SOC: 10/24/2024  FOTO: 10/24/2024  POC Expiration: 1/16/2024  Daily Treatment Log:  Date 10/24/2024       Visit # 1       Auth         Auth exp        Manual        IASTM for plantar fascia         Mconnel tape for correction of hyperpronation                 There Exer 10'       Seated plantar fascia stretch  3\" x10        Toe yoga  1x10 ea       Toe scrunch  1x10        Foot rolling over therabar         Toe splay/ spreading         S/L clamshells "         Std HS curl         JOJO                         HEP Created and discussed        There Activ                                                        NMReed        Walking marches         FTEO on foam         Tandem walking                         Modalities                                HEP:   Access Code: [email or web address]  URL: [email or web address]  Date: 10/24/2024  Prepared by: Eva Noyola    Exercises  - Seated Lesser Toes Extension  - 1 x daily - 7 x weekly - 1 sets - 10 reps  - Seated Great Toe Extension  - 1 x daily - 7 x weekly - 1 sets - 10 reps  - Toe Spreading  - 1 x daily - 7 x weekly - 1 sets - 10 reps  - Seated Plantar Fascia Stretch  - 1 x daily - 7 x weekly - 1 sets - 10 reps - 3 sec hold  - Foot Roller Plantar Massage  - 1 x daily - 7 x weekly - 1 sets - 10 reps

## 2024-10-25 ENCOUNTER — TELEPHONE (OUTPATIENT)
Age: 69
End: 2024-10-25

## 2024-10-25 NOTE — TELEPHONE ENCOUNTER
Wendi from [facility] pre-encounter center called requesting a medical review predetermination for patients Zoledronic acid infusion scheduled 10/31/24. Please advise.    Wendi Phone: [phone]  Care Management Phone: [phone]  Fax for clinicals: [phone]

## 2024-10-28 ENCOUNTER — TELEPHONE (OUTPATIENT)
Dept: NEUROSURGERY | Facility: CLINIC | Age: 69
End: 2024-10-28

## 2024-10-28 ENCOUNTER — APPOINTMENT (OUTPATIENT)
Dept: LAB | Facility: HOSPITAL | Age: 69
End: 2024-10-28
Payer: COMMERCIAL

## 2024-10-28 DIAGNOSIS — Z79.83 LONG TERM USE OF BISPHOSPHONATES: ICD-10-CM

## 2024-10-28 DIAGNOSIS — D64.9 LOW HEMOGLOBIN: ICD-10-CM

## 2024-10-28 DIAGNOSIS — M81.0 AGE-RELATED OSTEOPOROSIS WITHOUT CURRENT PATHOLOGICAL FRACTURE: ICD-10-CM

## 2024-10-28 DIAGNOSIS — R53.83 OTHER FATIGUE: ICD-10-CM

## 2024-10-28 LAB
ALBUMIN SERPL BCG-MCNC: 4.4 G/DL (ref 3.5–5)
ALP SERPL-CCNC: 129 U/L (ref 34–104)
ALT SERPL W P-5'-P-CCNC: 28 U/L (ref 7–52)
ANION GAP SERPL CALCULATED.3IONS-SCNC: 4 MMOL/L (ref 4–13)
AST SERPL W P-5'-P-CCNC: 21 U/L (ref 13–39)
BASOPHILS # BLD AUTO: 0.04 THOUSANDS/ΜL (ref 0–0.1)
BASOPHILS NFR BLD AUTO: 1 % (ref 0–1)
BILIRUB SERPL-MCNC: 0.28 MG/DL (ref 0.2–1)
BUN SERPL-MCNC: 15 MG/DL (ref 5–25)
CALCIUM SERPL-MCNC: 9.3 MG/DL (ref 8.4–10.2)
CHLORIDE SERPL-SCNC: 105 MMOL/L (ref 96–108)
CO2 SERPL-SCNC: 30 MMOL/L (ref 21–32)
CREAT SERPL-MCNC: 0.76 MG/DL (ref 0.6–1.3)
EOSINOPHIL # BLD AUTO: 0.17 THOUSAND/ΜL (ref 0–0.61)
EOSINOPHIL NFR BLD AUTO: 4 % (ref 0–6)
ERYTHROCYTE [DISTWIDTH] IN BLOOD BY AUTOMATED COUNT: 15.4 % (ref 11.6–15.1)
FERRITIN SERPL-MCNC: 8 NG/ML (ref 11–307)
GFR SERPL CREATININE-BSD FRML MDRD: 82 ML/MIN/1.73SQ M
GLUCOSE SERPL-MCNC: 102 MG/DL (ref 65–140)
HCT VFR BLD AUTO: 38.3 % (ref 34.8–46.1)
HGB BLD-MCNC: 12 G/DL (ref 11.5–15.4)
IMM GRANULOCYTES # BLD AUTO: 0.01 THOUSAND/UL (ref 0–0.2)
IMM GRANULOCYTES NFR BLD AUTO: 0 % (ref 0–2)
IRON SATN MFR SERPL: 15 % (ref 15–50)
IRON SERPL-MCNC: 58 UG/DL (ref 50–212)
LYMPHOCYTES # BLD AUTO: 1.73 THOUSANDS/ΜL (ref 0.6–4.47)
LYMPHOCYTES NFR BLD AUTO: 36 % (ref 14–44)
MCH RBC QN AUTO: 26.6 PG (ref 26.8–34.3)
MCHC RBC AUTO-ENTMCNC: 31.3 G/DL (ref 31.4–37.4)
MCV RBC AUTO: 85 FL (ref 82–98)
MONOCYTES # BLD AUTO: 0.38 THOUSAND/ΜL (ref 0.17–1.22)
MONOCYTES NFR BLD AUTO: 8 % (ref 4–12)
NEUTROPHILS # BLD AUTO: 2.5 THOUSANDS/ΜL (ref 1.85–7.62)
NEUTS SEG NFR BLD AUTO: 51 % (ref 43–75)
NRBC BLD AUTO-RTO: 0 /100 WBCS
PLATELET # BLD AUTO: 193 THOUSANDS/UL (ref 149–390)
PMV BLD AUTO: 11.3 FL (ref 8.9–12.7)
POTASSIUM SERPL-SCNC: 4 MMOL/L (ref 3.5–5.3)
PROT SERPL-MCNC: 7.2 G/DL (ref 6.4–8.4)
RBC # BLD AUTO: 4.51 MILLION/UL (ref 3.81–5.12)
SODIUM SERPL-SCNC: 139 MMOL/L (ref 135–147)
TIBC SERPL-MCNC: 380 UG/DL (ref 250–450)
UIBC SERPL-MCNC: 322 UG/DL (ref 155–355)
WBC # BLD AUTO: 4.83 THOUSAND/UL (ref 4.31–10.16)

## 2024-10-28 PROCEDURE — 83550 IRON BINDING TEST: CPT

## 2024-10-28 PROCEDURE — 86334 IMMUNOFIX E-PHORESIS SERUM: CPT

## 2024-10-28 PROCEDURE — 36415 COLL VENOUS BLD VENIPUNCTURE: CPT

## 2024-10-28 PROCEDURE — 82728 ASSAY OF FERRITIN: CPT

## 2024-10-28 PROCEDURE — 80053 COMPREHEN METABOLIC PANEL: CPT

## 2024-10-28 PROCEDURE — 83540 ASSAY OF IRON: CPT

## 2024-10-28 PROCEDURE — 84165 PROTEIN E-PHORESIS SERUM: CPT

## 2024-10-28 NOTE — TELEPHONE ENCOUNTER
CALL TRANSFERD TO ME BY YULY I MADE HER 5 YR F/U APT WITH AP ON DKO DAY PT ADVISED SEEING JAXON - LAST SEEN 2019 SAME REASON SO I GAVE HER OUR ADDRESS - SENT MESSAGE TO NURSES PT GOING TODAY TO [facility] HAVE LABS BUN / CREAT SCRIPT REQUESTED TO PLACE BY NURSES PT WAS THANKFUL -BA

## 2024-10-29 DIAGNOSIS — D64.9 LOW HEMOGLOBIN: ICD-10-CM

## 2024-10-29 DIAGNOSIS — R79.0 LOW FERRITIN: ICD-10-CM

## 2024-10-29 DIAGNOSIS — R53.83 OTHER FATIGUE: Primary | ICD-10-CM

## 2024-10-29 LAB
ALBUMIN SERPL ELPH-MCNC: 4.24 G/DL (ref 3.2–5.1)
ALBUMIN SERPL ELPH-MCNC: 61.5 % (ref 48–70)
ALPHA1 GLOB SERPL ELPH-MCNC: 0.32 G/DL (ref 0.15–0.47)
ALPHA1 GLOB SERPL ELPH-MCNC: 4.6 % (ref 1.8–7)
ALPHA2 GLOB SERPL ELPH-MCNC: 0.8 G/DL (ref 0.42–1.04)
ALPHA2 GLOB SERPL ELPH-MCNC: 11.6 % (ref 5.9–14.9)
BETA GLOB ABNORMAL SERPL ELPH-MCNC: 0.5 G/DL (ref 0.31–0.57)
BETA1 GLOB SERPL ELPH-MCNC: 7.3 % (ref 4.7–7.7)
BETA2 GLOB SERPL ELPH-MCNC: 4.3 % (ref 3.1–7.9)
BETA2+GAMMA GLOB SERPL ELPH-MCNC: 0.3 G/DL (ref 0.2–0.58)
GAMMA GLOB ABNORMAL SERPL ELPH-MCNC: 0.74 G/DL (ref 0.4–1.66)
GAMMA GLOB SERPL ELPH-MCNC: 10.7 % (ref 6.9–22.3)
IGG/ALB SER: 1.6 {RATIO} (ref 1.1–1.8)
INTERPRETATION UR IFE-IMP: NORMAL
PROT PATTERN SERPL ELPH-IMP: NORMAL
PROT SERPL-MCNC: 6.9 G/DL (ref 6.4–8.2)

## 2024-10-29 PROCEDURE — 84165 PROTEIN E-PHORESIS SERUM: CPT | Performed by: PATHOLOGY

## 2024-10-29 PROCEDURE — 86334 IMMUNOFIX E-PHORESIS SERUM: CPT | Performed by: PATHOLOGY

## 2024-10-30 ENCOUNTER — TELEPHONE (OUTPATIENT)
Age: 69
End: 2024-10-30

## 2024-10-30 NOTE — TELEPHONE ENCOUNTER
The patient called she spoke with Dr Griffin yesterday and she gave her instructions about taking iron supplements   the patient is unable to find the information she wrote down  and she doesn't remember what was said   please call her thank you

## 2024-10-31 ENCOUNTER — HOSPITAL ENCOUNTER (OUTPATIENT)
Dept: INFUSION CENTER | Facility: HOSPITAL | Age: 69
Discharge: HOME | End: 2024-10-31
Attending: INTERNAL MEDICINE
Payer: COMMERCIAL

## 2024-10-31 VITALS
OXYGEN SATURATION: 99 % | RESPIRATION RATE: 18 BRPM | HEART RATE: 66 BPM | BODY MASS INDEX: 32.42 KG/M2 | SYSTOLIC BLOOD PRESSURE: 131 MMHG | DIASTOLIC BLOOD PRESSURE: 61 MMHG | TEMPERATURE: 97.2 F | WEIGHT: 160.5 LBS

## 2024-10-31 DIAGNOSIS — M81.0 AGE-RELATED OSTEOPOROSIS WITHOUT CURRENT PATHOLOGICAL FRACTURE: Primary | ICD-10-CM

## 2024-10-31 PROCEDURE — 96365 THER/PROPH/DIAG IV INF INIT: CPT

## 2024-10-31 RX ORDER — ZOLEDRONIC ACID 0.05 MG/ML
5 INJECTION, SOLUTION INTRAVENOUS ONCE
OUTPATIENT
Start: 2025-10-28

## 2024-10-31 RX ORDER — FERROUS SULFATE 325(65) MG
325 TABLET, DELAYED RELEASE (ENTERIC COATED) ORAL 2 TIMES DAILY
COMMUNITY

## 2024-10-31 RX ORDER — SODIUM CHLORIDE 9 MG/ML
20 INJECTION, SOLUTION INTRAVENOUS ONCE
OUTPATIENT
Start: 2025-10-28

## 2024-10-31 RX ORDER — ZOLEDRONIC ACID 0.05 MG/ML
5 INJECTION, SOLUTION INTRAVENOUS ONCE
Status: COMPLETED | OUTPATIENT
Start: 2024-10-31 | End: 2024-10-31

## 2024-10-31 RX ORDER — SODIUM CHLORIDE 9 MG/ML
20 INJECTION, SOLUTION INTRAVENOUS ONCE
Status: COMPLETED | OUTPATIENT
Start: 2024-10-31 | End: 2024-10-31

## 2024-10-31 RX ADMIN — SODIUM CHLORIDE 20 ML/HR: 9 INJECTION, SOLUTION INTRAVENOUS at 13:40

## 2024-10-31 RX ADMIN — ZOLEDRONIC ACID 5 MG: 0.05 INJECTION, SOLUTION INTRAVENOUS at 13:40

## 2024-10-31 NOTE — PLAN OF CARE
Problem: Potential for Falls  Goal: Patient will remain free of falls  Description: INTERVENTIONS:  - Educate patient/family on patient safety including physical limitations  - Instruct patient to call for assistance with activity   - Consult OT/PT to assist with strengthening/mobility   - Keep Call bell within reach  - Keep bed low and locked with side rails adjusted as appropriate  - Keep care items and personal belongings within reach  - Initiate and maintain comfort rounds  - Make Fall Risk Sign visible to staff  -Outcome: Progressing

## 2024-11-07 ENCOUNTER — OFFICE VISIT (OUTPATIENT)
Dept: PHYSICAL THERAPY | Facility: CLINIC | Age: 69
End: 2024-11-07
Payer: COMMERCIAL

## 2024-11-07 DIAGNOSIS — M72.2 PLANTAR FASCIITIS, RIGHT: Primary | ICD-10-CM

## 2024-11-07 PROCEDURE — 97110 THERAPEUTIC EXERCISES: CPT

## 2024-11-07 PROCEDURE — 97140 MANUAL THERAPY 1/> REGIONS: CPT

## 2024-11-07 NOTE — PROGRESS NOTES
"Daily Note     Today's date: 2024  Patient name: Rashmi Bray  : 1955  MRN: [id number]  Referring provider: Lula Griffin DO  Dx:   Encounter Diagnosis     ICD-10-CM    1. Plantar fasciitis, right  M72.2                      Subjective: pt reports to session w/ pain in R heel 4/10 which is less than her usual. She did not start her HEP right away and things were worsening, she since has become compliant w/ HEP and  feels it has been helpful since she was here last.       Objective: See treatment diary below      Assessment: Tolerated treatment well. Pt dem dec pain post session. HEP updated to reflect additional ankle strengthening, good tolerance, some VC for form. Cont to progress as able each visit and updated HEP as appropriate due to visits once every 2 weeks due to high co-pay. Patient would benefit from continued PT      Plan: Continue per plan of care.      Precautions:   Past Medical History:   Diagnosis Date    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HCC)     Brain tumor (benign) (HCC)     Colon polyp     Constipation     CPAP (continuous positive airway pressure) dependence     GERD (gastroesophageal reflux disease)     Hiatal hernia     History of arteriovenous malformation     Liver problem     cyst removed from liver    Sleep apnea      SOC: 10/24/2024  FOTO: 10/24/2024  POC Expiration: 2024  Daily Treatment Log:  Date 10/24/2024 2024      Visit # 1 2       Auth         Auth exp        Manual  10'       IASTM for plantar fascia   RB  R/L       Mconnel tape for correction of hyperpronation                 There Exer 10' 35'       Seated plantar fascia stretch  3\" x10  10\"x5 R/L       Toe yoga  1x10 ea 1x10 ea R/L       Toe scrunch  1x10  20x       Seated DF stretch on slide board   5\"x10 R/L       Foot rolling over therabar   1'       Toe splay/ spreading   1x10       Standing HR   1x10       Seated TR   1x10       4 way ankle   RTB 1x10 ea R/L       S/L clamshells         Std HS curl       "   LAQ                         HEP Created and discussed        There Activ                                                        NMReed        Walking marches         FTEO on foam         Tandem walking                         Modalities                                HEP:   Access Code: [email or web address]  URL: [email or web address]  Date: 11/07/2024  Prepared by: Luz Huddleston    Exercises  - Seated Lesser Toes Extension  - 1 x daily - 7 x weekly - 1 sets - 10 reps  - Seated Great Toe Extension  - 1 x daily - 7 x weekly - 1 sets - 10 reps  - Toe Spreading  - 1 x daily - 7 x weekly - 1 sets - 10 reps  - Seated Plantar Fascia Stretch  - 1 x daily - 7 x weekly - 1 sets - 10 reps - 3 sec hold  - Foot Roller Plantar Massage  - 1 x daily - 7 x weekly - 1 sets - 10 reps  - Long Sitting Ankle Eversion with Resistance  - 1 x daily - 7 x weekly - 2 sets - 10 reps  - Long Sitting Ankle Plantar Flexion with Resistance  - 1 x daily - 7 x weekly - 2 sets - 10 reps  - Long Sitting Ankle Inversion with Resistance  - 1 x daily - 7 x weekly - 2 sets - 10 reps  - Long Sitting Ankle Dorsiflexion with Anchored Resistance  - 1 x daily - 7 x weekly - 2 sets - 10 reps  - Heel Raises with Counter Support  - 1 x daily - 7 x weekly - 2 sets - 10 reps  - Heel Toe Raises with Counter Support  - 1 x daily - 7 x weekly - 2 sets - 10 reps

## 2024-11-21 ENCOUNTER — OFFICE VISIT (OUTPATIENT)
Dept: PHYSICAL THERAPY | Facility: CLINIC | Age: 69
End: 2024-11-21
Payer: COMMERCIAL

## 2024-11-21 DIAGNOSIS — M72.2 PLANTAR FASCIITIS, RIGHT: Primary | ICD-10-CM

## 2024-11-21 PROCEDURE — 97140 MANUAL THERAPY 1/> REGIONS: CPT

## 2024-11-21 PROCEDURE — 97110 THERAPEUTIC EXERCISES: CPT

## 2024-11-21 NOTE — PROGRESS NOTES
"Daily Note     Today's date: 2024  Patient name: Rashmi Bray  : 1955  MRN: [id number]  Referring provider: Lula Griffin DO  Dx:   Encounter Diagnosis     ICD-10-CM    1. Plantar fasciitis, right  M72.2                      Subjective: pt reports to session w/ pain in R heel 2/10. Patient reports she has been having issue using the RIWI site. L foot is ok today.      Objective: See treatment diary below      Assessment: Tolerated treatment well with additional ankle ROM exercises performed with no pain reported. Cont to progress as able each visit and updated HEP as appropriate due to visits once every 2 weeks due to high co-pay. Patient would benefit from continued PT      Plan: Continue per plan of care.      Precautions:   Past Medical History:   Diagnosis Date    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HCC)     Brain tumor (benign) (HCC)     Colon polyp     Constipation     CPAP (continuous positive airway pressure) dependence     GERD (gastroesophageal reflux disease)     Hiatal hernia     History of arteriovenous malformation     Liver problem     cyst removed from liver    Sleep apnea      SOC: 10/24/2024  FOTO: 10/24/2024  POC Expiration: 2024  Daily Treatment Log:  Date 10/24/2024 2024 2024     Visit # 1 2  3     Auth         Auth exp        Manual  10'  10'     IASTM for plantar fascia   RB  R/L  EG R      Prince tape for correction of hyperpronation                 There Exer 10' 35'  30'     Seated plantar fascia stretch  3\" x10  10\"x5 R/L  10\"x5 R/L      Toe yoga  1x10 ea 1x10 ea R/L  1x10 ea.      Toe scrunch  1x10  20x  20x alt w/ toe splay      Seated DF stretch on slide board   5\"x10 R/L  5\" x10 R     Foot rolling over therabar   1'  1'     Toe splay/ spreading   1x10  20x alt with scrunches      Standing HR   1x10  1x10 ea HR/TR      Seated TR   1x10       4 way ankle   RTB 1x10 ea R/L  RTB 1x10 ea R/L      Std HS curl         LAQ         Slant board    10\" x5    "   [place] board    1x10 cw/ccw     HEP Created and discussed   Updated and discussed      There Activ                                                        NMReed        Walking marches         FTEO on foam         Tandem walking    2 laps near window sill.  Intermittent uni UE support      WB AP/ML    10x ea.              Modalities                                HEP:   Access Code: [email or web address]  URL: [email or web address]  Date: 11/21/2024  Prepared by: Eva Noyola    Exercises  - Seated Toe Towel Scrunches  - 1 x daily - 7 x weekly - 2 sets - 10 reps  - Seated Lesser Toes Extension  - 1 x daily - 7 x weekly - 1 sets - 10 reps  - Seated Great Toe Extension  - 1 x daily - 7 x weekly - 1 sets - 10 reps  - Toe Spreading  - 1 x daily - 7 x weekly - 2 sets - 10 reps  - Seated Plantar Fascia Stretch  - 1 x daily - 7 x weekly - 1 sets - 5 reps - 10 sec hold  - Foot Roller Plantar Massage  - 1 x daily - 7 x weekly - 1 min hold  - Long Sitting Ankle Eversion with Resistance  - 1 x daily - 7 x weekly - 2 sets - 10 reps  - Long Sitting Ankle Plantar Flexion with Resistance  - 1 x daily - 7 x weekly - 2 sets - 10 reps  - Long Sitting Ankle Inversion with Resistance  - 1 x daily - 7 x weekly - 2 sets - 10 reps  - Long Sitting Ankle Dorsiflexion with Anchored Resistance  - 1 x daily - 7 x weekly - 2 sets - 10 reps  - Heel Toe Raises with Counter Support  - 1 x daily - 7 x weekly - 2 sets - 10 reps  - Tandem Walking with Counter Support  - 1 x daily - 7 x weekly - 1 sets - 10 reps

## 2024-11-25 ENCOUNTER — TELEPHONE (OUTPATIENT)
Age: 69
End: 2024-11-25

## 2024-11-25 DIAGNOSIS — D42.0 ATYPICAL INTRACRANIAL MENINGIOMA (HCC): Primary | ICD-10-CM

## 2024-11-25 DIAGNOSIS — Z01.818 PRE-PROCEDURAL EXAMINATION: ICD-10-CM

## 2024-11-25 NOTE — TELEPHONE ENCOUNTER
PT CALLED ASKING IF SHE NEEDS ANY LABWORK COMPLETED IN PREPARATION FOR UPCOMING MRI BRAIN 11/29/2024. PT IS SCHED FOR 5YR FOLLOW UP 12/3/2024 ML AND REMEMBERS IN THE PAST LABS WERE NEEDED BEFORE IMAGING.    PLEASE CB PT TO LET HER KNOW IF LABS NEED TO BE ORDERED/COMPLETED FOR UPCOMING SCAN      THANK YOU

## 2024-11-25 NOTE — TELEPHONE ENCOUNTER
Reached out to patient and advised this RN will place orders for a BUN/Creat. She is aware to have this done prior to the MRI.      After review MRI order is also  will place a new updated script.     She was appreciative.

## 2024-11-27 ENCOUNTER — APPOINTMENT (OUTPATIENT)
Dept: LAB | Facility: HOSPITAL | Age: 69
End: 2024-11-27
Payer: COMMERCIAL

## 2024-11-27 ENCOUNTER — OFFICE VISIT (OUTPATIENT)
Dept: HEMATOLOGY ONCOLOGY | Facility: MEDICAL CENTER | Age: 69
End: 2024-11-27
Payer: COMMERCIAL

## 2024-11-27 VITALS
HEART RATE: 77 BPM | SYSTOLIC BLOOD PRESSURE: 132 MMHG | TEMPERATURE: 97.5 F | BODY MASS INDEX: 32.42 KG/M2 | HEIGHT: 59 IN | OXYGEN SATURATION: 98 % | RESPIRATION RATE: 17 BRPM | DIASTOLIC BLOOD PRESSURE: 72 MMHG

## 2024-11-27 DIAGNOSIS — Z01.818 PRE-PROCEDURAL EXAMINATION: ICD-10-CM

## 2024-11-27 DIAGNOSIS — C67.2 MALIGNANT NEOPLASM OF LATERAL WALL OF URINARY BLADDER (HCC): ICD-10-CM

## 2024-11-27 DIAGNOSIS — R79.0 LOW FERRITIN: Primary | ICD-10-CM

## 2024-11-27 DIAGNOSIS — R53.83 OTHER FATIGUE: ICD-10-CM

## 2024-11-27 DIAGNOSIS — D64.9 LOW HEMOGLOBIN: ICD-10-CM

## 2024-11-27 LAB
BUN SERPL-MCNC: 16 MG/DL (ref 5–25)
CREAT SERPL-MCNC: 0.73 MG/DL (ref 0.6–1.3)
GFR SERPL CREATININE-BSD FRML MDRD: 86 ML/MIN/1.73SQ M

## 2024-11-27 PROCEDURE — 99204 OFFICE O/P NEW MOD 45 MIN: CPT | Performed by: PHYSICIAN ASSISTANT

## 2024-11-27 PROCEDURE — 82565 ASSAY OF CREATININE: CPT

## 2024-11-27 PROCEDURE — 36415 COLL VENOUS BLD VENIPUNCTURE: CPT

## 2024-11-27 PROCEDURE — 84520 ASSAY OF UREA NITROGEN: CPT

## 2024-11-27 RX ORDER — PNV NO.95/FERROUS FUM/FOLIC AC 28MG-0.8MG
TABLET ORAL
COMMUNITY
Start: 2024-10-31

## 2024-11-27 NOTE — PROGRESS NOTES
[facility] [place] [facility] [id number]  Hematology Ambulatory Consult  Rashmi Bray, 1955, [id number]  11/27/2024      Assessment and Plan   1. Other fatigue  2. Low hemoglobin  3. Low ferritin    Patient is a 68-year-old who presents for evaluation of low ferritin.    She had lab work drawn on 10/28/2024.  WBC 4.83, hemoglobin 12.0 platelets 193, ferritin 8, iron saturation 15%.  Patient was recently started on oral iron 2 tablets daily.    She is tolerating oral iron without any issues.    We discussed that iron deficiency is either related to decreased absorption or blood loss.    Patient has history of chronic PPI use.  She may have decreased absorption related to same.  She has also donated blood every 3 months for the past 3 years or so.  Last donation was in the spring of this year.  Iron deficiency is also likely related to this as well.  She knows to cease blood donation for the time being.    She is up-to-date with her routine health maintenance including her colonoscopy and upper endoscopy which were last completed 6/13/2022.    Patient has no evidence of blood loss.    I suggested that we continue oral iron twice daily for now.  Will repeat lab work in about 3 months time.  Can consider IV iron in the future if patient has poor response to oral iron or becomes unable to tolerate.    4. Malignant neoplasm of urinary bladder  Patient with past medical history significant for bladder papilloma with atypia.    She had more recent finding of high-grade noninvasive urothelial carcinoma in the background of low-grade papillary urothelial carcinoma.  She is following closely with Dr. Fraire.  She underwent cystoscopy with bladder biopsy and fulguration along with intravesicle instillation of gemcitabine in June 2024.    Next cystoscopy due in spring 2025.    The patient is scheduled for follow-up in approximately 3 months.      Patient voiced agreement and understanding to the above.   Patient knows to call the Hematology/Oncology office with any questions and concerns regarding the above.    Barrier(s) to care: None.   The patient is able to self care.    Subjective     Chief Complaint   Patient presents with    Consult       Referring provider    Lula Griffin DO  [address] [place] [address]    History of present illness:   Patient is a 68-year-old who presents for evaluation of anemia.    Her past medical history is significant for atypical intracranial meningioma, hypertension, bladder papilloma with atypia.  The small polyp was removed in August 2022.      She had a cystoscopy in May 2024.  Along the posterior bladder wall a small 1 to 2 cm polyp was appreciated.  This had a low-grade and superficial appearance to it.  She underwent cystoscopy with bladder biopsy and fulguration along with intra vesicle instillation of gemcitabine in June 2024.  Pathology was consistent with high-grade noninvasive urothelial carcinoma in the background of low-grade papillary urothelial carcinoma.    She continues with surveillance cystoscopies with Dr. Fraire.  Next due in the spring 2025.    She had lab work drawn on 10/28/2024.  WBC 4.83, hemoglobin 12.0 platelets 193, ferritin 8, iron saturation 15%.  Patient was recently started on oral iron 2 tablets daily (in late October 2024).    She is up-to-date with her routine health maintenance.  Last colonoscopy was completed 6/13/2022.  Minimal diverticulosis of the sigmoid colon was noted.  Diminutive colon polyp removed from the distal sigmoid colon.  Repeat colonoscopy recommended in 7 years time.    EGD showed small sliding hiatal hernia.     She admits to fatigue. She denies any evidence of GI blood loss. She also has history of frequent blood donation. She had been donating blood every 3 months up until the spring of this year.    She is a former smoker of 3ppd for at  least 10 years. She quit smoking in 1999.    She denies nausea, vomiting, bowel changes, chest pain, shortness of breath.    Review of Systems   Constitutional:  Positive for fatigue. Negative for appetite change, fever and unexpected weight change.   HENT:  Negative for nosebleeds.    Respiratory:  Negative for cough, choking and shortness of breath.         Negative hemoptysis.   Cardiovascular:  Negative for chest pain, palpitations and leg swelling.   Gastrointestinal: Negative.  Negative for abdominal distention, abdominal pain, anal bleeding, blood in stool, constipation, diarrhea, nausea and vomiting.   Endocrine: Negative.  Negative for cold intolerance.   Genitourinary: Negative.  Negative for hematuria, menstrual problem, vaginal bleeding, vaginal discharge and vaginal pain.   Musculoskeletal: Negative.  Negative for arthralgias, myalgias, neck pain and neck stiffness.   Skin: Negative.  Negative for color change, pallor and rash.   Allergic/Immunologic: Negative.  Negative for immunocompromised state.   Neurological: Negative.  Negative for weakness and headaches.   Hematological:  Negative for adenopathy. Does not bruise/bleed easily.   All other systems reviewed and are negative.      Past Medical History:   Diagnosis Date    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HCC)     Brain tumor (benign) (HCC)     Colon polyp     Constipation     CPAP (continuous positive airway pressure) dependence     GERD (gastroesophageal reflux disease)     Hiatal hernia     History of arteriovenous malformation     Liver problem     cyst removed from liver    Sleep apnea      Past Surgical History:   Procedure Laterality Date    BRAIN SURGERY      BREAST BIOPSY Right     40 years ago    COLONOSCOPY      CRANIOTOMY      CYSTOSCOPY  03/15/2023    [facility]    EGD      LIVER SURGERY      adenoma removed from liver    MN COLONOSCOPY FLX DX W/COLLJ SPEC WHEN PFRMD N/A 03/20/2017    Procedure: EGD AND COLONOSCOPY;  Surgeon: Rachid Garcia MD;   Location: [facility] GI LAB;  Service: Gastroenterology    NE CYSTO W/REMOVAL OF LESIONS SMALL N/A 2019    Procedure: TRANSURETHRAL RESECTION OF BLADDER TUMOR (TURBT);  Surgeon: Girish Springer MD;  Location: [facility];  Service: Urology    NE CYSTO W/REMOVAL OF LESIONS SMALL N/A 2024    Procedure: TRANSURETHRAL RESECTION OF BLADDER TUMOR (TURBT), gemcitabine;  Surgeon: Radames Fraire MD;  Location: [facility];  Service: Urology    NE CYSTOURETHROSCOPY WITH BIOPSY N/A 2022    Procedure: CYSTOSCOPY WITH BLADDER BIOPSY, FULGURATION;  Surgeon: Radames Fraire MD;  Location: [facility];  Service: Urology    TUBAL LIGATION       Family History   Problem Relation Age of Onset    Cancer Mother 70        lung cancer    Lung cancer Mother     Cancer Father 38        brain cancer    Brain cancer Father     Melanoma Sister     Lung cancer Brother     Prostate cancer Brother     Obesity Daughter     Diabetes Son     Obesity Son     Asthma Son     Ovarian cancer Maternal Grandmother     Breast cancer Paternal Grandmother      Social History     Socioeconomic History    Marital status: /Civil Union     Spouse name: None    Number of children: None    Years of education: None    Highest education level: None   Occupational History    None   Tobacco Use    Smoking status: Former     Current packs/day: 0.00     Average packs/day: 3.0 packs/day for 28.0 years (84.0 ttl pk-yrs)     Types: Cigarettes     Start date:      Quit date:      Years since quittin.9    Smokeless tobacco: Never   Vaping Use    Vaping status: Never Used   Substance and Sexual Activity    Alcohol use: Yes     Comment: 1 or 2 drinks per month    Drug use: No    Sexual activity: Yes     Partners: Male     Birth control/protection: Post-menopausal, Female Sterilization   Other Topics Concern    None   Social History Narrative    None     Social Drivers of Health     Financial Resource Strain: Not on file   Food  "Insecurity: Not on file   Transportation Needs: Not on file   Physical Activity: Not on file   Stress: Not on file   Social Connections: Not on file   Intimate Partner Violence: Not on file   Housing Stability: Not on file         Current Outpatient Medications:     acetaminophen (TYLENOL) 500 mg tablet, Take 1 tablet by mouth, Disp: , Rfl:     Ascorbic Acid (QC VITAMIN C WITH ROBERTA HIPS PO), Take by mouth, Disp: , Rfl:     Calcium Carb-Cholecalciferol (OSCAL-D) 500 mg-200 units per tablet, Take 1 tablet by mouth 2 (two) times a day with meals, Disp: , Rfl:     Cholecalciferol (VITAMIN D3) 1,000 units tablet, Take 1,000 Units by mouth daily, Disp: , Rfl:     Co-Enzyme Q10 200 MG CAPS, Take by mouth, Disp: , Rfl:     estradiol (ESTRACE VAGINAL) 0.1 mg/g vaginal cream, Insert 0.5 g into the vagina 3 (three) times a week, Disp: 42.5 g, Rfl: 10    Ferrous Sulfate (Iron) 325 (65 Fe) MG TABS, , Disp: , Rfl:     lisinopril (ZESTRIL) 2.5 mg tablet, Take 1 tablet (2.5 mg total) by mouth daily, Disp: 90 tablet, Rfl: 1    LORATADINE ALLERGY RELIEF PO, Take by mouth, Disp: , Rfl:     magnesium gluconate (MAGONATE) 500 mg tablet, Take 500 mg by mouth once, Disp: , Rfl:     Mirabegron ER 50 MG TB24, take 1 tablet by mouth once daily, Disp: 90 tablet, Rfl: 1    Multiple Vitamin (multivitamin) capsule, Take 1 capsule by mouth daily, Disp: , Rfl:     OMEPRAZOLE PO, Take by mouth, Disp: , Rfl:     zinc gluconate 50 mg tablet, Take 50 mg by mouth daily, Disp: , Rfl:     B Complex-C CAPS, Take 1 capsule by mouth daily (Patient not taking: Reported on 11/27/2024), Disp: , Rfl:     ferrous sulfate 325 (65 FE) MG EC tablet, Take 325 mg by mouth 2 (two) times a day (Patient not taking: Reported on 11/27/2024), Disp: , Rfl:   No Known Allergies    Objective   /72 (BP Location: Left arm, Patient Position: Sitting, Cuff Size: Adult)   Pulse 77   Temp 97.5 °F (36.4 °C) (Temporal)   Resp 17   Ht 4' 11\" (1.499 m)   SpO2 98%   BMI " 32.42 kg/m²   Physical Exam  Constitutional:       General: She is not in acute distress.     Appearance: Normal appearance. She is well-developed.   HENT:      Head: Normocephalic and atraumatic.   Eyes:      General: No scleral icterus.     Conjunctiva/sclera: Conjunctivae normal.   Cardiovascular:      Rate and Rhythm: Normal rate and regular rhythm.   Pulmonary:      Effort: Pulmonary effort is normal. No respiratory distress.      Breath sounds: Normal breath sounds.   Abdominal:      General: Abdomen is flat. There is no distension.      Palpations: Abdomen is soft.      Tenderness: There is no abdominal tenderness.   Skin:     General: Skin is warm.      Coloration: Skin is not pale.      Findings: No rash.   Neurological:      Mental Status: She is alert and oriented to person, place, and time.   Psychiatric:         Mood and Affect: Mood normal.         Thought Content: Thought content normal.         Judgment: Judgment normal.       Result Review  Labs:   Latest Reference Range & Units 10/28/24 15:03   Iron 50 - 212 ug/dL 58   FERRITIN 11 - 307 ng/mL 8 (L)   Iron Saturation 15 - 50 % 15   TIBC 250 - 450 ug/dL 380   UIBC 155 - 355 ug/dL 322   (L): Data is abnormally low     Latest Reference Range & Units 10/28/24 15:03   WBC 4.31 - 10.16 Thousand/uL 4.83   RBC 3.81 - 5.12 Million/uL 4.51   Hemoglobin 11.5 - 15.4 g/dL 12.0   Hematocrit 34.8 - 46.1 % 38.3   MCV 82 - 98 fL 85   MCH 26.8 - 34.3 pg 26.6 (L)   MCHC 31.4 - 37.4 g/dL 31.3 (L)   RDW 11.6 - 15.1 % 15.4 (H)   Platelet Count 149 - 390 Thousands/uL 193   MPV 8.9 - 12.7 fL 11.3   nRBC /100 WBCs 0   Segmented % 43 - 75 % 51   Lymphocytes % 14 - 44 % 36   Monocytes % 4 - 12 % 8   Eosinophils % 0 - 6 % 4   Basophils % 0 - 1 % 1   Immature Grans % 0 - 2 % 0   Absolute Immature Grans 0.00 - 0.20 Thousand/uL 0.01   Absolute Neutrophils 1.85 - 7.62 Thousands/µL 2.50   Absolute Lymphocytes 0.60 - 4.47 Thousands/µL 1.73   Absolute Monocytes 0.17 - 1.22  Thousand/µL 0.38   Absolute Eosinophils 0.00 - 0.61 Thousand/µL 0.17   Absolute Basophils 0.00 - 0.10 Thousands/µL 0.04   (L): Data is abnormally low  (H): Data is abnormally high    Please note:  This report has been generated by a voice recognition software system. Therefore there may be syntax, spelling, and/or grammatical errors. Please call if you have any questions.

## 2024-11-29 ENCOUNTER — HOSPITAL ENCOUNTER (OUTPATIENT)
Dept: MRI IMAGING | Facility: HOSPITAL | Age: 69
Discharge: HOME | End: 2024-11-29
Attending: NEUROLOGICAL SURGERY
Payer: COMMERCIAL

## 2024-11-29 DIAGNOSIS — D42.0 ATYPICAL INTRACRANIAL MENINGIOMA (HCC): ICD-10-CM

## 2024-11-29 PROCEDURE — 70553 MRI BRAIN STEM W/O & W/DYE: CPT

## 2024-11-29 PROCEDURE — A9585 GADOBUTROL INJECTION: HCPCS | Performed by: NEUROLOGICAL SURGERY

## 2024-11-29 RX ORDER — GADOBUTROL 604.72 MG/ML
7.5 INJECTION INTRAVENOUS
Status: COMPLETED | OUTPATIENT
Start: 2024-11-29 | End: 2024-11-29

## 2024-11-29 RX ADMIN — GADOBUTROL 7.5 ML: 604.72 INJECTION INTRAVENOUS at 22:45

## 2024-12-03 ENCOUNTER — OFFICE VISIT (OUTPATIENT)
Dept: NEUROSURGERY | Facility: CLINIC | Age: 69
End: 2024-12-03
Payer: COMMERCIAL

## 2024-12-03 VITALS
SYSTOLIC BLOOD PRESSURE: 126 MMHG | RESPIRATION RATE: 17 BRPM | TEMPERATURE: 97.6 F | OXYGEN SATURATION: 99 % | DIASTOLIC BLOOD PRESSURE: 80 MMHG | HEIGHT: 59 IN | HEART RATE: 65 BPM | BODY MASS INDEX: 32.25 KG/M2 | WEIGHT: 160 LBS

## 2024-12-03 DIAGNOSIS — R41.3 MEMORY IMPAIRMENT OF GRADUAL ONSET: ICD-10-CM

## 2024-12-03 DIAGNOSIS — D42.0 ATYPICAL INTRACRANIAL MENINGIOMA (HCC): Primary | ICD-10-CM

## 2024-12-03 PROCEDURE — 99204 OFFICE O/P NEW MOD 45 MIN: CPT | Performed by: NURSE PRACTITIONER

## 2024-12-03 NOTE — PROGRESS NOTES
Name: Rashmi Bray      : 1955      MRN: [id number]  Encounter Provider: JHON Delarosa  Encounter Date: 12/3/2024   Encounter department: [facility]  :  Assessment & Plan  Atypical intracranial meningioma (HCC)  Presents for 5-year surveillance  Last evaluated 2019 for same with stable imaging.  S/p R frontotemporal craniotomy for resection of WHO grade II meningioma with Dr. Downey, 2015.  Ongoing issues with mild memory impairment, otherwise unchanged.  Exam is non-focal.    Imaging:  MRI brain w/wo, 24: Stable exam since 2019. No residual or recurrent disease status post resection and treatment-related changes. No acute intracranial abnormality.    Plan:  Reviewed imaging extensively with patient.  Discussed stability of findings without evidence of regrowth.  Will plan for additional surveillance imaging in 5 years per NCCN guidelines secondary to WHO grade II pathology.  Referral placed to neuropsychology for memory issues.  Follow up in 5 years with MRI brain w/wo.    Orders:    MRI brain w wo contrast; Future    Memory impairment of gradual onset    Orders:    Ambulatory Referral to Neuropsychology; Future        History of Present Illness     68-year-old female with past medical history of bladder adenocarcinoma, GERD, MILEY, AVM, who presents for surveillance status post right frontotemporal craniotomy for resection of who grade 2 meningioma in  with Dr. Downey.  She relates that she has been in good health since her last visit in 2019.  She has noticed mild progression of memory impairment including word finding difficulties which her  corroborates but states is not profound.  She is also complaining of some issues with her bladder and plantar fasciitis.  She denies any headaches.  She denies any visual disturbance.  She is accompanied today by her .      Review of Systems   HENT:  Negative for tinnitus and trouble  swallowing.    Eyes:  Negative for pain and visual disturbance.   Gastrointestinal:  Negative for nausea and vomiting.   Genitourinary:  Negative for enuresis.   Musculoskeletal:  Negative for gait problem.   Neurological:  Positive for speech difficulty (word finding) and numbness (intermittent in left arm). Negative for dizziness, weakness, light-headedness and headaches.   Hematological:  Does not bruise/bleed easily.   Psychiatric/Behavioral:  Negative for sleep disturbance.    All other systems reviewed and are negative.   I have personally reviewed the MA's review of systems and made changes as necessary.    Past Medical History   Past Medical History:   Diagnosis Date    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HCC)     Brain tumor (benign) (HCC)     Colon polyp     Constipation     CPAP (continuous positive airway pressure) dependence     GERD (gastroesophageal reflux disease)     Hiatal hernia     History of arteriovenous malformation     Liver problem     cyst removed from liver    Sleep apnea      Past Surgical History:   Procedure Laterality Date    BRAIN SURGERY      BREAST BIOPSY Right     40 years ago    COLONOSCOPY      CRANIOTOMY      CYSTOSCOPY  03/15/2023    [facility]    EGD      LIVER SURGERY      adenoma removed from liver    IN COLONOSCOPY FLX DX W/COLLJ SPEC WHEN PFRMD N/A 03/20/2017    Procedure: EGD AND COLONOSCOPY;  Surgeon: Rachid Garcia MD;  Location: [facility];  Service: Gastroenterology    IN CYSTO W/REMOVAL OF LESIONS SMALL N/A 12/04/2019    Procedure: TRANSURETHRAL RESECTION OF BLADDER TUMOR (TURBT);  Surgeon: Girish Springer MD;  Location: [facility];  Service: Urology    IN CYSTO W/REMOVAL OF LESIONS SMALL N/A 6/18/2024    Procedure: TRANSURETHRAL RESECTION OF BLADDER TUMOR (TURBT), gemcitabine;  Surgeon: Radames Fraire MD;  Location: [facility];  Service: Urology    IN CYSTOURETHROSCOPY WITH BIOPSY N/A 08/30/2022    Procedure: CYSTOSCOPY WITH BLADDER BIOPSY, FULGURATION;  Surgeon: Radames  Finn Fraire MD;  Location: [facility];  Service: Urology    TUBAL LIGATION       Family History   Problem Relation Age of Onset    Cancer Mother 70        lung cancer    Lung cancer Mother     Cancer Father 38        brain cancer    Brain cancer Father     Melanoma Sister     Lung cancer Brother     Prostate cancer Brother     Obesity Daughter     Diabetes Son     Obesity Son     Asthma Son     Ovarian cancer Maternal Grandmother     Breast cancer Paternal Grandmother       reports that she quit smoking about 25 years ago. Her smoking use included cigarettes. She started smoking about 53 years ago. She has a 84 pack-year smoking history. She has never used smokeless tobacco. She reports current alcohol use. She reports that she does not use drugs.  Current Outpatient Medications on File Prior to Visit   Medication Sig Dispense Refill    acetaminophen (TYLENOL) 500 mg tablet Take 1 tablet by mouth      Ascorbic Acid (QC VITAMIN C WITH ROBERTA HIPS PO) Take by mouth      B Complex-C CAPS Take 1 capsule by mouth daily      Calcium Carb-Cholecalciferol (OSCAL-D) 500 mg-200 units per tablet Take 1 tablet by mouth 2 (two) times a day with meals      Cholecalciferol (VITAMIN D3) 1,000 units tablet Take 1,000 Units by mouth daily      Co-Enzyme Q10 200 MG CAPS Take by mouth      estradiol (ESTRACE VAGINAL) 0.1 mg/g vaginal cream Insert 0.5 g into the vagina 3 (three) times a week 42.5 g 10    Ferrous Sulfate (Iron) 325 (65 Fe) MG TABS       lisinopril (ZESTRIL) 2.5 mg tablet Take 1 tablet (2.5 mg total) by mouth daily 90 tablet 1    LORATADINE ALLERGY RELIEF PO Take by mouth      magnesium gluconate (MAGONATE) 500 mg tablet Take 500 mg by mouth once      Mirabegron ER 50 MG TB24 take 1 tablet by mouth once daily 90 tablet 1    Multiple Vitamin (multivitamin) capsule Take 1 capsule by mouth daily      OMEPRAZOLE PO Take by mouth      zinc gluconate 50 mg tablet Take 50 mg by mouth daily      ferrous sulfate 325 (65 FE) MG  "EC tablet Take 325 mg by mouth 2 (two) times a day (Patient not taking: Reported on 12/3/2024)       No current facility-administered medications on file prior to visit.   No Known Allergies   Pertinent Medical History             Objective   /80 (BP Location: Left arm, Patient Position: Sitting, Cuff Size: Standard)   Pulse 65   Temp 97.6 °F (36.4 °C) (Temporal)   Resp 17   Ht 4' 11\" (1.499 m)   Wt 72.6 kg (160 lb)   SpO2 99%   BMI 32.32 kg/m²     Physical Exam  Constitutional:       Appearance: She is well-developed. She is obese.   HENT:      Head: Normocephalic and atraumatic.   Eyes:      Extraocular Movements: EOM normal.      Pupils: Pupils are equal, round, and reactive to light.   Pulmonary:      Effort: Pulmonary effort is normal.   Abdominal:      Palpations: Abdomen is soft.   Musculoskeletal:         General: Normal range of motion.      Cervical back: Normal range of motion and neck supple.   Skin:     General: Skin is warm and dry.   Neurological:      Mental Status: She is alert and oriented to person, place, and time.      Motor: Motor strength is normal.     Coordination: Finger-Nose-Finger Test normal.      Gait: Gait is intact.   Psychiatric:         Speech: Speech normal.       Neurologic Exam     Mental Status   Oriented to person, place, and time.   Oriented to person.   Oriented to place.   Oriented to time.   Registration: recalls 3 of 3 objects. Follows 1 step commands.   Attention: normal. Concentration: normal.   Speech: speech is normal   Level of consciousness: alert  Knowledge: good and consistent with education. Able to perform simple calculations.   Able to name object.     Cranial Nerves     CN II   Visual acuity: normal  Right visual field deficit: none  Left visual field deficit: none     CN III, IV, VI   Pupils are equal, round, and reactive to light.  Extraocular motions are normal.   Right pupil: Size: 3 mm. Shape: regular. Reactivity: brisk. Consensual response: " intact. Accommodation: intact.   Left pupil: Size: 3 mm. Shape: regular. Reactivity: brisk. Consensual response: intact. Accommodation: intact.   CN III: no CN III palsy  CN VI: no CN VI palsy  Nystagmus: none   Conjugate gaze: present    CN V   Right facial sensation deficit: none  Left facial sensation deficit: none    CN VII   Right facial weakness: none  Left facial weakness: none    CN VIII   Hearing: intact    CN IX, X   Palate: symmetric    CN XI   Right sternocleidomastoid strength: normal  Left sternocleidomastoid strength: normal  Right trapezius strength: normal  Left trapezius strength: normal    CN XII   Tongue: not atrophic  Fasciculations: absent  Tongue deviation: none    Motor Exam   Muscle bulk: normal  Overall muscle tone: normal    Strength   Strength 5/5 throughout.     Sensory Exam   Light touch normal.     Gait, Coordination, and Reflexes     Gait  Gait: normal    Coordination   Finger to nose coordination: normal    Tremor   Resting tremor: absent  Intention tremor: absent  Action tremor: absent    Reflexes   Reflexes 2+ except as noted.   Right Walters: absent  Left Walters: absent  Right ankle clonus: absent  Left ankle clonus: absent      SL AMB Radiology Results Review: I personally reviewed the following image studies in PACS and associated radiology reports: MRI brain. My interpretation of the radiology images/reports is: as above.    Administrative Statements   I have spent a total time of 30 minutes in caring for this patient on the day of the visit/encounter including Diagnostic results, Prognosis, Risks and benefits of tx options, Instructions for management, Patient and family education, Importance of tx compliance, Risk factor reductions, Impressions, Counseling / Coordination of care, Documenting in the medical record, Reviewing / ordering tests, medicine, procedures  , and Obtaining or reviewing history  .

## 2024-12-03 NOTE — ASSESSMENT & PLAN NOTE
Presents for 5-year surveillance  Last evaluated 2019 for same with stable imaging.  S/p R frontotemporal craniotomy for resection of WHO grade II meningioma with Dr. Downey, 5/6/2015.  Ongoing issues with mild memory impairment, otherwise unchanged.  Exam is non-focal.    Imaging:  MRI brain w/wo, 11/29/24: Stable exam since 11/22/2019. No residual or recurrent disease status post resection and treatment-related changes. No acute intracranial abnormality.    Plan:  Reviewed imaging extensively with patient.  Discussed stability of findings without evidence of regrowth.  Will plan for additional surveillance imaging in 5 years per NCCN guidelines secondary to WHO grade II pathology.  Referral placed to neuropsychology for memory issues.  Follow up in 5 years with MRI brain w/wo.    Orders:    MRI brain w wo contrast; Future

## 2024-12-04 ENCOUNTER — TELEPHONE (OUTPATIENT)
Dept: PHYSICAL THERAPY | Facility: CLINIC | Age: 69
End: 2024-12-04

## 2024-12-04 NOTE — TELEPHONE ENCOUNTER
Patient called [facility] to cancel for tomorrow (12/5/24).  She has no transportation.  Did not want to reschedule but confirmed 12/19 appt and will continue with HEP.     Eva Noyola PT, DPT   License #  [id number]

## 2024-12-05 ENCOUNTER — APPOINTMENT (OUTPATIENT)
Dept: PHYSICAL THERAPY | Facility: CLINIC | Age: 69
End: 2024-12-05
Payer: COMMERCIAL

## 2024-12-19 ENCOUNTER — APPOINTMENT (OUTPATIENT)
Dept: PHYSICAL THERAPY | Facility: CLINIC | Age: 69
End: 2024-12-19
Payer: COMMERCIAL

## 2025-01-09 ENCOUNTER — EVALUATION (OUTPATIENT)
Dept: PHYSICAL THERAPY | Facility: CLINIC | Age: 70
End: 2025-01-09
Payer: COMMERCIAL

## 2025-01-09 DIAGNOSIS — M72.2 PLANTAR FASCIITIS, RIGHT: Primary | ICD-10-CM

## 2025-01-09 PROCEDURE — 97110 THERAPEUTIC EXERCISES: CPT

## 2025-01-09 PROCEDURE — 97112 NEUROMUSCULAR REEDUCATION: CPT

## 2025-01-09 NOTE — PROGRESS NOTES
PT Re-Evaluation  and PT Discharge    Today's date: 2025  Patient name: Rashmi Bray  : 1955  MRN: [id number]  Referring provider: Lula Griffin DO  Dx:   Encounter Diagnosis     ICD-10-CM    1. Plantar fasciitis, right  M72.2                        Assessment    Assessment details: Rashmi Bray is a 68 y.o. female who presents for re-eval of R foot pain and occasional L foot pain. Patient presents with improvements in LE strength, toe and forefoot ROM, ambulation, ad balance. Patient remains pain free for the last 2 weeks and will DC to [facility].         Goals  Short Term Goal: 4 weeks   STG1: Pt will dem ability to perform great toe and lesser toe extension independently of each other to dem improved foot intrinsic strength ---MET  STG2: Pt will be independent with HEP to maximize progress between therapy sessions ---MET  STG3: Pt will demo 1/2 MMT grade increase in LE strength to improve stair negotiation and ADLs --MET    Long Term Goals: 12 weeks   LTG1: Pt will demo SLS of 45 sec or greater with </= min sway to demo improved SL stability --Not met   LTG2: Pt will demo LE strength WNL for reciprocal stair negotiation ---MET   LTG3: Pt will return to walking pain free per PLOF to improve functional independence ---MET      Plan    Planned therapy interventions: home exercise program and patient education    Plan of Care beginning date: 10/24/2024  Plan of Care expiration date: 2025  Treatment plan discussed with: patient  Plan details: Patient to DC to [facility]       Subjective Evaluation    History of Present Illness  Mechanism of injury: Patient reports to PT for re-eval of  R and L foot pain. [place] reports being pain free the last 2 weeks. Patient lost copy of HEP and PT offered to reprint it for her. Patient to DC to said [facility] due to being pain free.   Pain  No pain reported    Social Support  Stairs in house: yes   Lives in: multiple-level home  Lives with: spouse    Employment status: not  "working    Diagnostic Tests  No diagnostic tests performed        Objective    AROM:    R  L  Ankle DF    10  15  Ankle PF    WNL  WNL  Ankle inversion  35  35  Ankle eversion  30  28    STRENGTH:    R  L    Ankle DF   5/5  5/5  Ankle PF   5/5  5/5  Ankle inversion  4+/5  4+/5  Ankle eversoin  4+/5  4+/5    Knee ext   4+/5  4+/5  Knee flex   4+/5  4+/5  Hip abduction   5/5  5/5  Hip flexion   5/5  5/5  Hip ER    4+/5  4+/5  Hip IR     4+/5  4+/5      Flexibility: improved gastroc flexibility compared to previous evaluation       Joint Mobility:   WNL talocrural jt (DF/PF)  WNL subtalar jt (inv/evr)   Improve motion in forefoot noted     Tenderness/Palpation: Mild TTP in B/L plantar fascia     Balance: improvements with good use ankle strategies noted with all balance testing, but no excessive motion noted   Tandem R posterior: 45 sec min sway   Tandem L posterior:  45 sec min sway   SLS R: 15 sec   SLS L: 15 sec       Function:  Gait: nil deviations at this time  Standing: noted decreased in hyperpronation of B/L Feet (R>L) but still present            Precautions:   Past Medical History:   Diagnosis Date    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HCC)     Brain tumor (benign) (HCC)     Colon polyp     Constipation     CPAP (continuous positive airway pressure) dependence     GERD (gastroesophageal reflux disease)     Hiatal hernia     History of arteriovenous malformation     Liver problem     cyst removed from liver    Sleep apnea    SOC: 10/24/2024  FOTO: 10/24/2024  POC Expiration: 1/16/2024  Daily Treatment Log:  Date 10/24/2024 11/7/2024 11/21/2024 1/9/2024    Visit # 1 2  3 4    Auth         Auth exp        Manual  10'  10'     IASTM for plantar fascia   RB  R/L  EG R      Prince tape for correction of hyperpronation                 There Exer 10' 35'  30' 43'    Seated plantar fascia stretch  3\" x10  10\"x5 R/L  10\"x5 R/L  10\" x5 R/L     Toe yoga  1x10 ea 1x10 ea R/L  1x10 ea.  2x10 ea    Toe scrunch  1x10  20x  20x " "alt w/ toe splay  20x alt w/ toe splay     Seated DF stretch on slide board   5\"x10 R/L  5\" x10 R 5\" x10 R    Foot rolling over therabar   1'  1' 1'    Toe splay/ spreading   1x10  20x alt with scrunches  20x alt with scrunches     Standing HR   1x10  1x10 ea HR/TR  1x10 ea HR/TR     Seated TR   1x10       4 way ankle   RTB 1x10 ea R/L  RTB 1x10 ea R/L  RTB 1x10 ea R/L     Std HS curl         LAQ         Slant board    10\" x5  10\" x5     [place] board    1x10 cw/ccw 1x10 cw/ccw     HEP Created and discussed   Updated and discussed  Re-printed and discussed     Objective measures     EG    There Activ                                                        NMReed    10'    Walking marches         FTEO on foam         Tandem walking    2 laps near window sill.  Intermittent uni UE support  2 laps near counter     WB AP/ML    10x ea.  20x ea             Modalities                                HEP:   Access Code: [email or web address]  URL: [email or web address]  Date: 11/21/2024  Prepared by: Eva Noyola    Exercises  - Seated Toe Towel Scrunches  - 1 x daily - 7 x weekly - 2 sets - 10 reps  - Seated Lesser Toes Extension  - 1 x daily - 7 x weekly - 1 sets - 10 reps  - Seated Great Toe Extension  - 1 x daily - 7 x weekly - 1 sets - 10 reps  - Toe Spreading  - 1 x daily - 7 x weekly - 2 sets - 10 reps  - Seated Plantar Fascia Stretch  - 1 x daily - 7 x weekly - 1 sets - 5 reps - 10 sec hold  - Foot Roller Plantar Massage  - 1 x daily - 7 x weekly - 1 min hold  - Long Sitting Ankle Eversion with Resistance  - 1 x daily - 7 x weekly - 2 sets - 10 reps  - Long Sitting Ankle Plantar Flexion with Resistance  - 1 x daily - 7 x weekly - 2 sets - 10 reps  - Long Sitting Ankle Inversion with Resistance  - 1 x daily - 7 x weekly - 2 sets - 10 reps  - Long Sitting Ankle Dorsiflexion with Anchored Resistance  - 1 x daily - 7 x weekly - 2 sets - 10 reps  - Heel Toe Raises with Counter Support  - 1 x daily - 7 x weekly - 2 sets - " 10 reps  - Tandem Walking with Counter Support  - 1 x daily - 7 x weekly - 1 sets - 10 reps

## 2025-02-17 ENCOUNTER — TELEPHONE (OUTPATIENT)
Dept: PULMONOLOGY | Facility: MEDICAL CENTER | Age: 70
End: 2025-02-17

## 2025-02-17 NOTE — TELEPHONE ENCOUNTER
Comprehensive Nutrition Assessment    Type and Reason for Visit:  Initial,Consult,Patient Education (CHF)    Nutrition Recommendations/Plan:   1. Re-attempt for full diet education once available   2. Trial low calorie, high protein oral nutrition supplement to optimize nutrition   3. Monitor Po intakes, GI status, weight trends, appetite changes, fluids, labs, and POC      Malnutrition Assessment:  Malnutrition Status:  Insufficient data (06/08/22 1446)    Context:  Acute Illness       Nutrition Assessment:    Pt on cardiac diet, consulted for diet education, pt was transferred during time of visit. Will send out Heart Failure Nutrition Therapy handouts to room. No po intake documented yet, concerns for poor po intake s/s SOB PTA. Will send oral nutrition supplement to optimize nutrition. Nutrition Related Findings:    Hx chronic tobacco dependence, cocaine abuse, anxiety, CKDIII. +BLE edema. Cr 1.3., K 3.1, Alb 3.3 Wound Type: None       Current Nutrition Intake & Therapies:    Average Meal Intake: Unable to assess  Average Supplements Intake: None Ordered  ADULT DIET; Regular; Low Fat/Low Chol/High Fiber/2 gm Na    Anthropometric Measures:  Height: 5' 7\" (170.2 cm)  Ideal Body Weight (IBW): 135 lbs (61 kg)    Admission Body Weight:  (unsure ?)  Current Body Weight: 176 lb (79.8 kg), 130.4 % IBW. Weight Source: Not Specified  Current BMI (kg/m2): 27.6  Usual Body Weight:  (limited in hx)  BMI Categories: Overweight (BMI 25.0-29. 9)    Estimated Daily Nutrient Needs:  Energy Requirements Based On: Formula  Weight Used for Energy Requirements: Current  Energy (kcal/day): 2985-1276 (1.0-1.2 stress factor)  Weight Used for Protein Requirements: Ideal  Protein (g/day): 61-74  Method Used for Fluid Requirements: 1 ml/kcal  Fluid (ml/day): at least 1500    Nutrition Diagnosis:   · Predicted inadequate energy intake related to cardiac dysfunction as evidenced by localized or generalized fluid accumulation,lab values LM for patient to call office back to schedule follow up appointment. Patient is due in April for a 1yr f/u .   (decreased lytes)    Nutrition Interventions:   Food and/or Nutrient Delivery: Continue Current Diet,Start Oral Nutrition Supplement  Nutrition Education/Counseling:  (Left handouts at bedside, reattempt at f/u)  Coordination of Nutrition Care: Continue to monitor while inpatient       Goals:  Goals: Teach back diet education,prior to discharge       Nutrition Monitoring and Evaluation:   Behavioral-Environmental Outcomes: Readiness for Change  Food/Nutrient Intake Outcomes: Food and Nutrient Intake,Supplement Intake  Physical Signs/Symptoms Outcomes: Biochemical Data,Nausea or Vomiting,Meal Time Behavior,Weight,Fluid Status or Edema    Discharge Planning:    Continue current diet     Tricia Holt RD, LD  Contact: [phone]

## 2025-02-25 ENCOUNTER — TELEPHONE (OUTPATIENT)
Dept: HEMATOLOGY ONCOLOGY | Facility: MEDICAL CENTER | Age: 70
End: 2025-02-25

## 2025-02-25 NOTE — TELEPHONE ENCOUNTER
Left message on patient's phone indicating to have labs drawn prior to appointment.  Indicated that the scripts are in the system, the tests are non-fasting and that patient can go to any [facility] facility to have the labs drawn.  Provided callback number [phone].

## 2025-02-26 ENCOUNTER — APPOINTMENT (OUTPATIENT)
Dept: LAB | Facility: HOSPITAL | Age: 70
End: 2025-02-26
Payer: COMMERCIAL

## 2025-02-26 DIAGNOSIS — R79.0 LOW FERRITIN: ICD-10-CM

## 2025-02-26 LAB
ALBUMIN SERPL BCG-MCNC: 4.6 G/DL (ref 3.5–5)
ALP SERPL-CCNC: 112 U/L (ref 34–104)
ALT SERPL W P-5'-P-CCNC: 39 U/L (ref 7–52)
ANION GAP SERPL CALCULATED.3IONS-SCNC: 11 MMOL/L (ref 4–13)
AST SERPL W P-5'-P-CCNC: 24 U/L (ref 13–39)
BASOPHILS # BLD AUTO: 0.04 THOUSANDS/ÂΜL (ref 0–0.1)
BASOPHILS NFR BLD AUTO: 1 % (ref 0–1)
BILIRUB SERPL-MCNC: 0.35 MG/DL (ref 0.2–1)
BUN SERPL-MCNC: 14 MG/DL (ref 5–25)
CALCIUM SERPL-MCNC: 9.7 MG/DL (ref 8.4–10.2)
CHLORIDE SERPL-SCNC: 102 MMOL/L (ref 96–108)
CO2 SERPL-SCNC: 24 MMOL/L (ref 21–32)
CREAT SERPL-MCNC: 0.82 MG/DL (ref 0.6–1.3)
EOSINOPHIL # BLD AUTO: 0.19 THOUSAND/ÂΜL (ref 0–0.61)
EOSINOPHIL NFR BLD AUTO: 4 % (ref 0–6)
ERYTHROCYTE [DISTWIDTH] IN BLOOD BY AUTOMATED COUNT: 12.7 % (ref 11.6–15.1)
GFR SERPL CREATININE-BSD FRML MDRD: 74 ML/MIN/1.73SQ M
GLUCOSE SERPL-MCNC: 92 MG/DL (ref 65–140)
HCT VFR BLD AUTO: 40.2 % (ref 34.8–46.1)
HGB BLD-MCNC: 13.2 G/DL (ref 11.5–15.4)
IMM GRANULOCYTES # BLD AUTO: 0.01 THOUSAND/UL (ref 0–0.2)
IMM GRANULOCYTES NFR BLD AUTO: 0 % (ref 0–2)
LYMPHOCYTES # BLD AUTO: 2.18 THOUSANDS/ÂΜL (ref 0.6–4.47)
LYMPHOCYTES NFR BLD AUTO: 41 % (ref 14–44)
MCH RBC QN AUTO: 28.6 PG (ref 26.8–34.3)
MCHC RBC AUTO-ENTMCNC: 32.8 G/DL (ref 31.4–37.4)
MCV RBC AUTO: 87 FL (ref 82–98)
MONOCYTES # BLD AUTO: 0.35 THOUSAND/ÂΜL (ref 0.17–1.22)
MONOCYTES NFR BLD AUTO: 7 % (ref 4–12)
NEUTROPHILS # BLD AUTO: 2.59 THOUSANDS/ÂΜL (ref 1.85–7.62)
NEUTS SEG NFR BLD AUTO: 47 % (ref 43–75)
NRBC BLD AUTO-RTO: 0 /100 WBCS
PLATELET # BLD AUTO: 178 THOUSANDS/UL (ref 149–390)
PMV BLD AUTO: 10.5 FL (ref 8.9–12.7)
POTASSIUM SERPL-SCNC: 4.1 MMOL/L (ref 3.5–5.3)
PROT SERPL-MCNC: 7.3 G/DL (ref 6.4–8.4)
RBC # BLD AUTO: 4.62 MILLION/UL (ref 3.81–5.12)
SODIUM SERPL-SCNC: 137 MMOL/L (ref 135–147)
WBC # BLD AUTO: 5.36 THOUSAND/UL (ref 4.31–10.16)

## 2025-02-26 PROCEDURE — 82728 ASSAY OF FERRITIN: CPT

## 2025-02-26 PROCEDURE — 36415 COLL VENOUS BLD VENIPUNCTURE: CPT

## 2025-02-26 PROCEDURE — 83540 ASSAY OF IRON: CPT

## 2025-02-26 PROCEDURE — 85025 COMPLETE CBC W/AUTO DIFF WBC: CPT

## 2025-02-26 PROCEDURE — 83550 IRON BINDING TEST: CPT

## 2025-02-26 PROCEDURE — 80053 COMPREHEN METABOLIC PANEL: CPT

## 2025-02-27 LAB
FERRITIN SERPL-MCNC: 85 NG/ML (ref 11–307)
IRON SATN MFR SERPL: 22 % (ref 15–50)
IRON SERPL-MCNC: 83 UG/DL (ref 50–212)
TIBC SERPL-MCNC: 373.8 UG/DL (ref 250–450)
TRANSFERRIN SERPL-MCNC: 267 MG/DL (ref 203–362)
UIBC SERPL-MCNC: 291 UG/DL (ref 155–355)

## 2025-02-28 ENCOUNTER — OFFICE VISIT (OUTPATIENT)
Dept: HEMATOLOGY ONCOLOGY | Facility: MEDICAL CENTER | Age: 70
End: 2025-02-28
Payer: COMMERCIAL

## 2025-02-28 VITALS
BODY MASS INDEX: 32.05 KG/M2 | RESPIRATION RATE: 17 BRPM | SYSTOLIC BLOOD PRESSURE: 123 MMHG | HEIGHT: 59 IN | DIASTOLIC BLOOD PRESSURE: 79 MMHG | WEIGHT: 159 LBS | OXYGEN SATURATION: 99 % | HEART RATE: 69 BPM | TEMPERATURE: 97.9 F

## 2025-02-28 DIAGNOSIS — C67.2 MALIGNANT NEOPLASM OF LATERAL WALL OF URINARY BLADDER (HCC): ICD-10-CM

## 2025-02-28 DIAGNOSIS — R79.0 LOW FERRITIN: Primary | ICD-10-CM

## 2025-02-28 PROCEDURE — 99213 OFFICE O/P EST LOW 20 MIN: CPT | Performed by: PHYSICIAN ASSISTANT

## 2025-02-28 NOTE — ASSESSMENT & PLAN NOTE
Patient with past medical history significant for bladder papilloma with atypia.     She had more recent finding of high-grade noninvasive urothelial carcinoma in the background of low-grade papillary urothelial carcinoma.  She is following closely with Dr. Fraire.  She underwent cystoscopy with bladder biopsy and fulguration along with intravesicle instillation of gemcitabine in June 2024.     Next cystoscopy due in May.

## 2025-02-28 NOTE — PROGRESS NOTES
Name: Rashmi Bray      : 1955      MRN: [id number]  Encounter Provider: Nadia Ramirez PA-C  Encounter Date: 2025   Encounter department: [facility]  :  Assessment & Plan  Low ferritin  Patient is a 69-year-old who presents for evaluation of low ferritin.    She had lab work drawn on 10/28/2024.  WBC 4.83, hemoglobin 12.0 platelets 193, ferritin 8, iron saturation 15%.  Patient was started on oral iron 2 tablets daily.      She is tolerating oral iron without any issues.     We discussed that iron deficiency is either related to decreased absorption or blood loss.     Patient has history of chronic PPI use.  She may have decreased absorption related to same.  She has also donated blood every 3 months for the past 3 years or so.  Last donation was around 1 year ago. Iron deficiency is also likely related to this as well.       She is up-to-date with her routine health maintenance including her colonoscopy and upper endoscopy which were last completed 2022.     Patient has no evidence of blood loss.    She had lab work drawn on 2025.  WBC 5.36, hemoglobin 13.2, hematocrit 40.2, MCV 87, RDW 12.7, platelets 178.  Ferritin 85, iron saturation 22%. Patient is currently taking oral iron 2 tablets daily.     She is having very good response to oral iron.  I suggested that we continue oral iron twice daily for now.  Will repeat lab work in about 4 months time.  She knows that she should continue to hold blood donations for now.    Orders:    CBC and differential; Future    Iron Panel (Includes Ferritin, Iron Sat%, Iron, and TIBC); Future    Vitamin B12; Future    Folate; Future    Comprehensive metabolic panel; Future    Malignant neoplasm of lateral wall of urinary bladder (HCC)  Patient with past medical history significant for bladder papilloma with atypia.     She had more recent finding of high-grade noninvasive urothelial carcinoma in the background of  low-grade papillary urothelial carcinoma.  She is following closely with Dr. Fraire.  She underwent cystoscopy with bladder biopsy and fulguration along with intravesicle instillation of gemcitabine in June 2024.     Next cystoscopy due in May.         History of Present Illness   Chief Complaint   Patient presents with    Follow-up   Patient is a 69-year-old who presents for follow-up of anemia.     Her past medical history is significant for atypical intracranial meningioma, hypertension, bladder papilloma with atypia.  The small polyp was removed in August 2022.       She had a cystoscopy in May 2024.  Along the posterior bladder wall a small 1 to 2 cm polyp was appreciated.  This had a low-grade and superficial appearance to it.  She underwent cystoscopy with bladder biopsy and fulguration along with intra vesicle instillation of gemcitabine in June 2024.  Pathology was consistent with high-grade noninvasive urothelial carcinoma in the background of low-grade papillary urothelial carcinoma.     She continues with surveillance cystoscopies with Dr. Fraire.  Next due in.     She had lab work drawn on 10/28/2024.  WBC 4.83, hemoglobin 12.0 platelets 193, ferritin 8, iron saturation 15%.  Patient was recently started on oral iron 2 tablets daily (in late October 2024).     She is up-to-date with her routine health maintenance.  Last colonoscopy was completed 6/13/2022.  Minimal diverticulosis of the sigmoid colon was noted.  Diminutive colon polyp removed from the distal sigmoid colon.  Repeat colonoscopy recommended in 7 years time.     EGD showed small sliding hiatal hernia.      She denies any evidence of GI blood loss. She also has history of frequent blood donation. She had been donating blood every 3 months up until around 1 year ago (when the anemia was initially discovered).     She is a former smoker of 3ppd for at least 10 years. She quit smoking in 1999.     She denies nausea, vomiting, bowel changes,  chest pain, shortness of breath.  She does not feel much different with improvement in hemoglobin and iron stores.     Review of Systems   Constitutional:  Positive for fatigue. Negative for appetite change, fever and unexpected weight change.   HENT:  Negative for nosebleeds.    Respiratory:  Negative for cough, choking and shortness of breath.         Negative hemoptysis.   Cardiovascular:  Negative for chest pain, palpitations and leg swelling.   Gastrointestinal: Negative.  Negative for abdominal distention, abdominal pain, anal bleeding, blood in stool, constipation, diarrhea, nausea and vomiting.   Endocrine: Negative.  Negative for cold intolerance.   Genitourinary: Negative.  Negative for hematuria, menstrual problem, vaginal bleeding, vaginal discharge and vaginal pain.   Musculoskeletal: Negative.  Negative for arthralgias, myalgias, neck pain and neck stiffness.   Skin: Negative.  Negative for color change, pallor and rash.   Allergic/Immunologic: Negative.  Negative for immunocompromised state.   Neurological: Negative.  Negative for weakness and headaches.   Hematological:  Negative for adenopathy. Does not bruise/bleed easily.   All other systems reviewed and are negative.        Objective   Vitals:    02/28/25 0805   BP: 123/79   Pulse: 69   Resp: 17   Temp: 97.9 °F (36.6 °C)   SpO2: 99%     Physical Exam  Constitutional:       General: She is not in acute distress.     Appearance: Normal appearance. She is well-developed.   HENT:      Head: Normocephalic and atraumatic.   Eyes:      General: No scleral icterus.     Conjunctiva/sclera: Conjunctivae normal.   Cardiovascular:      Rate and Rhythm: Normal rate and regular rhythm.   Pulmonary:      Effort: Pulmonary effort is normal. No respiratory distress.      Breath sounds: Normal breath sounds.   Abdominal:      General: Abdomen is flat. There is no distension.      Palpations: Abdomen is soft.      Tenderness: There is no abdominal tenderness.    Skin:     General: Skin is warm.      Coloration: Skin is not pale.      Findings: No rash.   Neurological:      Mental Status: She is alert and oriented to person, place, and time.   Psychiatric:         Mood and Affect: Mood normal.         Thought Content: Thought content normal.         Judgment: Judgment normal.     Labs: I have reviewed the following labs:  Results for orders placed or performed in visit on 02/26/25   CBC and differential   Result Value Ref Range    WBC 5.36 4.31 - 10.16 Thousand/uL    RBC 4.62 3.81 - 5.12 Million/uL    Hemoglobin 13.2 11.5 - 15.4 g/dL    Hematocrit 40.2 34.8 - 46.1 %    MCV 87 82 - 98 fL    MCH 28.6 26.8 - 34.3 pg    MCHC 32.8 31.4 - 37.4 g/dL    RDW 12.7 11.6 - 15.1 %    MPV 10.5 8.9 - 12.7 fL    Platelets 178 149 - 390 Thousands/uL    nRBC 0 /100 WBCs    Segmented % 47 43 - 75 %    Immature Grans % 0 0 - 2 %    Lymphocytes % 41 14 - 44 %    Monocytes % 7 4 - 12 %    Eosinophils Relative 4 0 - 6 %    Basophils Relative 1 0 - 1 %    Absolute Neutrophils 2.59 1.85 - 7.62 Thousands/µL    Absolute Immature Grans 0.01 0.00 - 0.20 Thousand/uL    Absolute Lymphocytes 2.18 0.60 - 4.47 Thousands/µL    Absolute Monocytes 0.35 0.17 - 1.22 Thousand/µL    Eosinophils Absolute 0.19 0.00 - 0.61 Thousand/µL    Basophils Absolute 0.04 0.00 - 0.10 Thousands/µL   Comprehensive metabolic panel   Result Value Ref Range    Sodium 137 135 - 147 mmol/L    Potassium 4.1 3.5 - 5.3 mmol/L    Chloride 102 96 - 108 mmol/L    CO2 24 21 - 32 mmol/L    ANION GAP 11 4 - 13 mmol/L    BUN 14 5 - 25 mg/dL    Creatinine 0.82 0.60 - 1.30 mg/dL    Glucose 92 65 - 140 mg/dL    Calcium 9.7 8.4 - 10.2 mg/dL    AST 24 13 - 39 U/L    ALT 39 7 - 52 U/L    Alkaline Phosphatase 112 (H) 34 - 104 U/L    Total Protein 7.3 6.4 - 8.4 g/dL    Albumin 4.6 3.5 - 5.0 g/dL    Total Bilirubin 0.35 0.20 - 1.00 mg/dL    eGFR 74 ml/min/1.73sq m   TIBC Panel (incl. Iron, TIBC, % Iron Saturation)   Result Value Ref Range    Iron  Saturation 22 15 - 50 %    TIBC 373.8 250 - 450 ug/dL    Iron 83 50 - 212 ug/dL    Transferrin 267 203 - 362 mg/dL    UIBC 291 155 - 355 ug/dL   Result Value Ref Range    Ferritin 85 11 - 307 ng/mL     *Note: Due to a large number of results and/or encounters for the requested time period, some results have not been displayed. A complete set of results can be found in Results Review.

## 2025-03-11 ENCOUNTER — TELEPHONE (OUTPATIENT)
Age: 70
End: 2025-03-11

## 2025-03-17 DIAGNOSIS — R35.0 URINARY FREQUENCY: ICD-10-CM

## 2025-03-18 RX ORDER — MIRABEGRON 50 MG/1
1 TABLET, FILM COATED, EXTENDED RELEASE ORAL DAILY
Qty: 90 TABLET | Refills: 1 | Status: SHIPPED | OUTPATIENT
Start: 2025-03-18

## 2025-03-31 DIAGNOSIS — Z12.31 ENCOUNTER FOR SCREENING MAMMOGRAM FOR MALIGNANT NEOPLASM OF BREAST: Primary | ICD-10-CM

## 2025-04-16 ENCOUNTER — OFFICE VISIT (OUTPATIENT)
Dept: PULMONOLOGY | Facility: MEDICAL CENTER | Age: 70
End: 2025-04-16
Payer: COMMERCIAL

## 2025-04-16 VITALS
HEART RATE: 66 BPM | DIASTOLIC BLOOD PRESSURE: 82 MMHG | SYSTOLIC BLOOD PRESSURE: 122 MMHG | WEIGHT: 158 LBS | HEIGHT: 59 IN | OXYGEN SATURATION: 100 % | RESPIRATION RATE: 12 BRPM | BODY MASS INDEX: 31.85 KG/M2 | TEMPERATURE: 97.6 F

## 2025-04-16 DIAGNOSIS — G47.33 OBSTRUCTIVE SLEEP APNEA: Primary | Chronic | ICD-10-CM

## 2025-04-16 DIAGNOSIS — G47.10 HYPERSOMNOLENCE: ICD-10-CM

## 2025-04-16 DIAGNOSIS — R05.3 CHRONIC COUGH: ICD-10-CM

## 2025-04-16 PROBLEM — R05.9 COUGH: Status: ACTIVE | Noted: 2025-04-16

## 2025-04-16 PROBLEM — F51.01 PRIMARY INSOMNIA: Chronic | Status: RESOLVED | Noted: 2024-04-10 | Resolved: 2025-04-16

## 2025-04-16 PROBLEM — R05.9 COUGH: Chronic | Status: ACTIVE | Noted: 2025-04-16

## 2025-04-16 PROCEDURE — 99214 OFFICE O/P EST MOD 30 MIN: CPT | Performed by: NURSE PRACTITIONER

## 2025-04-16 NOTE — ASSESSMENT & PLAN NOTE
Patient reports that her hypersomnolence has somewhat improved.  Apparent the patient has been diagnosed with iron deficiency.  She is seeing hematologist oncologist.  According to patient she is having more energy during the day.

## 2025-04-16 NOTE — ASSESSMENT & PLAN NOTE
Patient has a cough that started about 1 year ago.  It appears that it could be postnasal drip.  Patient does report that she lives in an area which there is a lot of environmental dust.  This cough is mostly nonproductive.  She is on lisinopril and I did send a note to her PCP perhaps about considering changing this.  She is agreeable with this plan of care.  Also noted the patient did have a chest x-ray a year ago that was normal

## 2025-04-16 NOTE — PROGRESS NOTES
Follow-up  Visit - Pulmonary Medicine   Name: Rashmi Bray      : 1955      MRN: [id number]  Encounter Provider: JHON Mccabe  Encounter Date: 2025   Encounter department: [facility]  :  Assessment & Plan  Obstructive sleep apnea  Teresita continues to use and benefit from her auto CPAP..  Reports using her machine approximately 5 hours per night.  Unfortunately I was unable to get an accurate compliance data according to patient she does use it and is somewhat well rested in the morning.  Patient received initial machine in 2019 her current 1 has not been working well I am going to order a new auto CPAP she is agreeable to this plan of care and I also did suggest to patient that she make an appointment upon receive all of her new auto CPAP to report to the office in approximately 8 to 10 weeks according to Christina and she continues to use and benefit    Orders:    CPAP Auto New DME    Chronic cough  Patient has a cough that started about 1 year ago.  It appears that it could be postnasal drip.  Patient does report that she lives in an area which there is a lot of environmental dust.  This cough is mostly nonproductive.  She is on lisinopril and I did send a note to her PCP perhaps about considering changing this.  She is agreeable with this plan of care.  Also noted the patient did have a chest x-ray a year ago that was normal         Hypersomnolence  Patient reports that her hypersomnolence has somewhat improved.  Apparent the patient has been diagnosed with iron deficiency.  She is seeing hematologist oncologist.  According to patient she is having more energy during the day.           No follow-ups on file.    History of Present Illness   Rashmi Bray is a 69 y.o. female who presents history of obstructive sleep apnea.  He is here today for a face-to-face visit.  She was diagnosed in 2018.  She currently is on an auto CPAP 5 to 20 cm of water pressure.  Teresita has  "had a cough that she states is about 1 year old.  She did have a CAT scan since her last visit.  She did have a chest x-ray done in April 2024 her lungs were clear according to patient she was diagnosed with COVID-19 in January 2025 her cough did start somewhat before then and is dry in nature mostly.    Review of Systems   Constitutional: Negative.    HENT: Negative.     Eyes: Negative.    Respiratory:  Positive for cough.         Mostly at nighttime.  It is dry and nonproductive.  Off   Cardiovascular: Negative.    Gastrointestinal: Negative.    Endocrine: Negative.    Genitourinary: Negative.    Musculoskeletal: Negative.    Skin: Negative.    Hematological: Negative.    Psychiatric/Behavioral: Negative.         Aside from what is mentioned in the HPI, ROS is otherwise negative         Medical History Reviewed by provider this encounter:     .    Objective   /82 (BP Location: Left arm, Patient Position: Sitting, Cuff Size: Extra-Large)   Pulse 66   Temp 97.6 °F (36.4 °C) (Temporal)   Resp 12   Ht 4' 11\" (1.499 m)   Wt 71.7 kg (158 lb)   SpO2 100%   BMI 31.91 kg/m²     Physical Exam  Constitutional:       Appearance: She is obese.   HENT:      Head: Normocephalic and atraumatic.      Nose: Nose normal.      Mouth/Throat:      Mouth: Mucous membranes are moist.      Pharynx: Oropharynx is clear.      Comments: Mallampati 4  Eyes:      Pupils: Pupils are equal, round, and reactive to light.   Cardiovascular:      Rate and Rhythm: Normal rate and regular rhythm.      Pulses: Normal pulses.      Heart sounds: Normal heart sounds.   Pulmonary:      Effort: Pulmonary effort is normal.      Breath sounds: Normal breath sounds.   Musculoskeletal:         General: Normal range of motion.      Cervical back: Normal range of motion.   Skin:     General: Skin is warm.      Capillary Refill: Capillary refill takes less than 2 seconds.   Neurological:      General: No focal deficit present.      Mental Status: She " "is alert and oriented to person, place, and time.   Psychiatric:         Mood and Affect: Mood normal.         Behavior: Behavior normal.           Diagnostic Data:  Labs: I personally reviewed the most recent laboratory data pertinent to today's visit.      Radiology results:        PFT/spirometry results:  No results found for: \"FEV1\", \"FVC\", \"HFS0VXC\", \"TLC\", \"DLCO\"       Oximetry testing:      Other studies:      JHON Mccabe      "

## 2025-04-16 NOTE — ASSESSMENT & PLAN NOTE
Teresita continues to use and benefit from her auto CPAP..  Reports using her machine approximately 5 hours per night.  Unfortunately I was unable to get an accurate compliance data according to patient she does use it and is somewhat well rested in the morning.  Patient received initial machine in 2019 her current 1 has not been working well I am going to order a new auto CPAP she is agreeable to this plan of care and I also did suggest to patient that she make an appointment upon receive all of her new auto CPAP to report to the office in approximately 8 to 10 weeks according to Christina and she continues to use and benefit    Orders:    CPAP Auto New DME

## 2025-04-17 LAB
DME PARACHUTE DELIVERY DATE REQUESTED: NORMAL
DME PARACHUTE ITEM DESCRIPTION: NORMAL
DME PARACHUTE ORDER STATUS: NORMAL
DME PARACHUTE SUPPLIER NAME: NORMAL
DME PARACHUTE SUPPLIER PHONE: NORMAL

## 2025-04-20 DIAGNOSIS — I10 ESSENTIAL HYPERTENSION: ICD-10-CM

## 2025-04-20 DIAGNOSIS — R80.9 MICROALBUMINURIA: ICD-10-CM

## 2025-04-21 RX ORDER — LISINOPRIL 2.5 MG/1
2.5 TABLET ORAL DAILY
Qty: 90 TABLET | Refills: 1 | Status: SHIPPED | OUTPATIENT
Start: 2025-04-21

## 2025-05-01 ENCOUNTER — DOCUMENTATION (OUTPATIENT)
Dept: SLEEP CENTER | Facility: CLINIC | Age: 70
End: 2025-05-01

## 2025-05-01 LAB

## 2025-05-01 NOTE — PROGRESS NOTES
Patient scheduled for DME set up today at [facility].   MACHINE: Airsense 11  PRESSURE:5-20 cmh20  MASK: Airfit N30i small frame small cushion  TOLERATION: Well  PROVIDED INFORMATION: Welcome guide emailed to patient, DME numbers to contact 24/7, cleaning and replacement schedules.   PATIENT STATUS: 5-year renewal  MOBILE JULIENNE PAIRED: Yes    Roberta Cage RRT.       Alert and oriented, no focal deficits, no motor or sensory deficits.

## 2025-05-13 ENCOUNTER — PROCEDURE VISIT (OUTPATIENT)
Dept: UROLOGY | Facility: AMBULATORY SURGERY CENTER | Age: 70
End: 2025-05-13
Payer: COMMERCIAL

## 2025-05-13 VITALS
DIASTOLIC BLOOD PRESSURE: 80 MMHG | HEART RATE: 72 BPM | HEIGHT: 59 IN | OXYGEN SATURATION: 98 % | WEIGHT: 159 LBS | SYSTOLIC BLOOD PRESSURE: 130 MMHG | BODY MASS INDEX: 32.05 KG/M2

## 2025-05-13 DIAGNOSIS — C67.2 MALIGNANT NEOPLASM OF LATERAL WALL OF URINARY BLADDER (HCC): Primary | ICD-10-CM

## 2025-05-13 LAB
SL AMB  POCT GLUCOSE, UA: NORMAL
SL AMB LEUKOCYTE ESTERASE,UA: NORMAL
SL AMB POCT BILIRUBIN,UA: NORMAL
SL AMB POCT BLOOD,UA: NORMAL
SL AMB POCT CLARITY,UA: CLEAR
SL AMB POCT COLOR,UA: YELLOW
SL AMB POCT KETONES,UA: NORMAL
SL AMB POCT NITRITE,UA: NORMAL
SL AMB POCT PH,UA: 6
SL AMB POCT SPECIFIC GRAVITY,UA: 1.01
SL AMB POCT URINE PROTEIN: NORMAL
SL AMB POCT UROBILINOGEN: 0.2

## 2025-05-13 PROCEDURE — 81002 URINALYSIS NONAUTO W/O SCOPE: CPT | Performed by: UROLOGY

## 2025-05-13 PROCEDURE — 88112 CYTOPATH CELL ENHANCE TECH: CPT | Performed by: PATHOLOGY

## 2025-05-13 PROCEDURE — 52000 CYSTOURETHROSCOPY: CPT | Performed by: UROLOGY

## 2025-05-13 NOTE — PROGRESS NOTES
Cystoscopy     Date/Time  5/13/2025 8:00 AM     Performed by  Radames Fraire MD   Authorized by  Radames Fraire MD         Procedure Details:  Procedure type: cystoscopy      Rashmi is a 69-year-old female with a history of low-grade superficial bladder cancer diagnosed in June 2024.  There was a small foci of high-grade disease.  At that time that she underwent biopsy she also received intravesical mitomycin.  Her last surveillance cystoscopy was in the fall 2024 and was benign.  Her last upper tract imaging was a renal and bladder ultrasound performed in March 2023.  She denies any hematuria.      Cystoscopy Procedure note:    Risk and benefits of flexible cystoscopy were discussed. Informed consent was obtained. A urine dip is adequate for cystoscopy. The patient was placed into the modified supine position. Her genitalia was prepped and draped in a sterile fashion. Viscous lidocaine was instilled into the urethra. Flexible cystoscopy was then performed. The bladder was thoroughly inspected. Both ureteral orifices were visualized with clear efflux of urine. The bladder mucosa was thoroughly inspected. There was no evidence of mucosal abnormalities, stones, or lesions.  A small scar adjacent to the left ureteral orifice was appreciated from prior biopsy.  Retroflexion was normal. Overall this was a negative cystoscopy. A female office staff member was present throughout the entire procedure.    Impression: History of low-grade superficial urothelial carcinoma without recurrence    Plan: I provided the patient and her  reassurance that cystoscopic evaluation in the office today shows no pathology.  Urine will be sent for cytology.  Follow-up in 1 year with cystoscopy and upper tract imaging.  The patient was instructed to return sooner if she were to develop gross hematuria.

## 2025-05-15 PROCEDURE — 88112 CYTOPATH CELL ENHANCE TECH: CPT | Performed by: PATHOLOGY

## 2025-05-30 ENCOUNTER — HOSPITAL ENCOUNTER (OUTPATIENT)
Dept: RADIOLOGY | Facility: HOSPITAL | Age: 70
Discharge: HOME | End: 2025-05-30
Payer: COMMERCIAL

## 2025-05-30 DIAGNOSIS — Z12.31 ENCOUNTER FOR SCREENING MAMMOGRAM FOR MALIGNANT NEOPLASM OF BREAST: ICD-10-CM

## 2025-05-30 PROCEDURE — 77067 SCR MAMMO BI INCL CAD: CPT

## 2025-05-30 PROCEDURE — 77063 BREAST TOMOSYNTHESIS BI: CPT

## 2025-06-02 ENCOUNTER — RESULTS FOLLOW-UP (OUTPATIENT)
Dept: FAMILY MEDICINE CLINIC | Facility: CLINIC | Age: 70
End: 2025-06-02

## 2025-06-03 ENCOUNTER — OFFICE VISIT (OUTPATIENT)
Dept: FAMILY MEDICINE CLINIC | Facility: CLINIC | Age: 70
End: 2025-06-03

## 2025-06-03 VITALS
BODY MASS INDEX: 31.85 KG/M2 | OXYGEN SATURATION: 99 % | DIASTOLIC BLOOD PRESSURE: 80 MMHG | WEIGHT: 158 LBS | HEART RATE: 67 BPM | SYSTOLIC BLOOD PRESSURE: 136 MMHG | HEIGHT: 59 IN

## 2025-06-03 DIAGNOSIS — Z13.220 SCREENING CHOLESTEROL LEVEL: ICD-10-CM

## 2025-06-03 DIAGNOSIS — M81.6 LOCALIZED OSTEOPOROSIS WITHOUT CURRENT PATHOLOGICAL FRACTURE: ICD-10-CM

## 2025-06-03 DIAGNOSIS — R79.0 LOW FERRITIN: ICD-10-CM

## 2025-06-03 DIAGNOSIS — Z00.00 ANNUAL PHYSICAL EXAM: Primary | ICD-10-CM

## 2025-06-03 DIAGNOSIS — D42.0 ATYPICAL INTRACRANIAL MENINGIOMA (HCC): ICD-10-CM

## 2025-06-03 DIAGNOSIS — D64.9 LOW HEMOGLOBIN: ICD-10-CM

## 2025-06-03 DIAGNOSIS — M25.551 RIGHT HIP PAIN: ICD-10-CM

## 2025-06-03 DIAGNOSIS — Z87.891 FORMER SMOKER: ICD-10-CM

## 2025-06-03 DIAGNOSIS — C67.2 MALIGNANT NEOPLASM OF LATERAL WALL OF URINARY BLADDER (HCC): ICD-10-CM

## 2025-06-03 DIAGNOSIS — E55.9 VITAMIN D INSUFFICIENCY: ICD-10-CM

## 2025-06-03 DIAGNOSIS — R80.9 MICROALBUMINURIA: ICD-10-CM

## 2025-06-03 DIAGNOSIS — I10 ESSENTIAL HYPERTENSION: ICD-10-CM

## 2025-06-03 DIAGNOSIS — Z13.29 SCREENING FOR THYROID DISORDER: ICD-10-CM

## 2025-06-03 RX ORDER — LOSARTAN POTASSIUM 25 MG/1
25 TABLET ORAL DAILY
Qty: 30 TABLET | Refills: 2 | Status: SHIPPED | OUTPATIENT
Start: 2025-06-03

## 2025-06-03 NOTE — ASSESSMENT & PLAN NOTE
"- s/p R frontotemporal craniotomy for resection of WHO grade II meningioma with Dr. Downey on 5/6/2015   - follows with NeuroSurg - last OV was 12/2024   - MRI brain (11/2024): \"Stable exam since 11/22/2019.  No residual or recurrent disease status post resection and treatment-related changes. No acute intracranial abnormality.\"   - mild memory impairment and pt concerned that she has ADHD - was referred to NeuroPsych - advised to schedule c/s   - repeat imaging in 5yrs        "

## 2025-06-03 NOTE — ASSESSMENT & PLAN NOTE
- BP elevated in office  - of note, pt did not take her antihypertensive yet this morning   - (+) dry cough - possible ADR of ACEI - will switch to ARB 25mg QD   - stable renal function   - will check urine microalbumin   - diet/exercise   - caution with NSAIDs  - limit Na to 2g/day   - UTD with PCV20   - UTD with annual Flu vaccine   - annual Ophtho exam advised  - RTO in 2wks for BP check - pt and spouse aware and agreeable   - The 10-year ASCVD risk score (Stevenson SOLARES, et al., 2019) is: 9%    Values used to calculate the score:      Age: 69 years      Clincally relevant sex: Female      Is Non- : No      Diabetic: No      Tobacco smoker: No      Systolic Blood Pressure: 136 mmHg      Is BP treated: Yes      HDL Cholesterol: 81 mg/dL      Total Cholesterol: 196 mg/dL  Orders:    Albumin / creatinine urine ratio; Future    losartan (COZAAR) 25 mg tablet; Take 1 tablet (25 mg total) by mouth daily

## 2025-06-03 NOTE — PROGRESS NOTES
Adult Annual Physical  Name: Rashmi Bray      : 1955      MRN: [id number]  Encounter Provider: Lula Griffin DO  Encounter Date: 6/3/2025   Encounter department: [facility]    :  Assessment & Plan  Annual physical exam  - reviewed PMHx, PSHx, meds, allergies, FHx, Soc Hx and Sexual Hx   - due for screening labs - script given   - declined STD screening   - UTD with Tdap   - UTD with COVID IMMs and Booster  - UTD with PCV20   - UTD with annual Flu vaccine   - discussed diet and encouraged exercise   - [employer] [employer] - last annual was 2023 - advised to schedule annual - pt aware and agreeable   - last Cervical Ca screening was   - UTD with Mammo (2025) - annual screening advised   - last Cscope was in 2022 with 7yr recall 2/2 h/o colon polyps  - Osteoporosis - follows with Rheum   - UTD with Dental    - overdue for Ophtho - advised to schedule   - RTO in 1yr for annual exam or sooner if with abnml labs - pt and spouse aware and agreeable        Essential hypertension  - BP elevated in office  - of note, pt did not take her antihypertensive yet this morning   - (+) dry cough - possible ADR of ACEI - will switch to ARB 25mg QD   - stable renal function   - will check urine microalbumin   - diet/exercise   - caution with NSAIDs  - limit Na to 2g/day   - UTD with PCV20   - UTD with annual Flu vaccine   - annual Ophtho exam advised  - RTO in 2wks for BP check - pt and spouse aware and agreeable   - The 10-year ASCVD risk score (Stevenson SOLARES, et al., 2019) is: 9%    Values used to calculate the score:      Age: 69 years      Clincally relevant sex: Female      Is Non- : No      Diabetic: No      Tobacco smoker: No      Systolic Blood Pressure: 136 mmHg      Is BP treated: Yes      HDL Cholesterol: 81 mg/dL      Total Cholesterol: 196 mg/dL  Orders:    Albumin / creatinine urine ratio; Future    losartan (COZAAR) 25 mg tablet; Take 1 tablet (25 mg  "total) by mouth daily    Former smoker         Microalbuminuria         Low hemoglobin  - follows with Heme/Onc   - tolerating Fe supplement BID - cont   - Fe rich diet        Low ferritin         Malignant neoplasm of lateral wall of urinary bladder (HCC)  - follows with Uro - s/p cystoscopy 5/13/2025        Atypical intracranial meningioma (HCC)  - s/p R frontotemporal craniotomy for resection of WHO grade II meningioma with Dr. Downey on 5/6/2015   - follows with NeuroSurg - last OV was 12/2024   - MRI brain (11/2024): \"Stable exam since 11/22/2019.  No residual or recurrent disease status post resection and treatment-related changes. No acute intracranial abnormality.\"   - mild memory impairment and pt concerned that she has ADHD - was referred to NeuroPsych - advised to schedule c/s   - repeat imaging in 5yrs        Vitamin D insufficiency    Orders:    Vitamin D 25 hydroxy; Future    Screening cholesterol level    Orders:    Lipid panel; Future    Screening for thyroid disorder    Orders:    TSH, 3rd generation with Free T4 reflex    Right hip pain    Orders:    Ambulatory Referral to Physical Therapy; Future    Localized osteoporosis without current pathological fracture  - Osteoporosis - follows with Rheum            Preventive Screenings:    - Cervical cancer screening: screening not indicated   - Breast cancer screening: screening up-to-date     Immunizations:  - Immunizations due: Zoster (Shingrix)         History of Present Illness     Adult Annual Physical:  Patient presents for annual physical.   - Specialists: Uro, Derm, Rheum, Pulm, Sleep Med   - PMHx: GERD, MILEY on CPAP, AVM, meningioma, melanoma in situ of back, adeno of bladder, diverticulosis, colon polyps, memory difficulties, h/o COVID (12/2021)   - allergies: NKDA  - Meds: see med rec   - PSHx: craniotomy, adenoma removed from liver, TURBT, tubal ligation   - FHx: F (brain ca), M (lung ca), Sister (melanoma), Brother (lung ca), Brother " "(prostate ca), MGM (ovarian ca), PGM (breast ca)  - Immunizations: UTD Tdap, UTD with COVID IMMs and Booster, UTD with PCV20   - GYN Hx: [facility] - last annual was 5/2023, last Cervical Ca screening was 2022  - Hm: Cscope in 6/2022 - 7yr f/u (2029), UTD with Mammo (5/2025) - annual screening advised   - diet/exercise: chair exercises, diet: eggs daily  - social: former smoker (quit in 1999, 2-3PPD/30yrs), social EtOH   - sexual Hx: declined STD screening in the office today   - last vision: wears glasses, Dr Jules - overdue   - last dental: [facility] - recent dentures    - ROS: today in the office pt denies F/C/N/V/HA/visual changes/CP/palpitations/SOB/wheezing/abd pain/D/LE edema   - of note, did not take her antihypertensive this AM   - (+) dry cough - did see Pulm - ?ACEI   - (+) chronic intermittent R-hip pain - has not tried anything for this .     Diet and Physical Activity:  - Diet/Nutrition: no special diet.  - Exercise: walking and 30-60 minutes on average.    Depression Screening:    - PHQ-9 Score: 0    General Health:  - Sleep: 4-6 hours of sleep on average, uses CPAP machine and unrefreshing sleep.  - Vision: no vision problems and most recent eye exam > 1 year ago.  - Dental: no dental visits for > 1 year and floss regularly.    /GYN Health:  - Follows with GYN: yes.   - Menopause: postmenopausal.   - History of STDs: no  - Contraception: menopause.      Advanced Care Planning:  - Has an advanced directive?: no    - Has a durable medical POA?: no      Review of Systems as per HPI       Objective   /80   Pulse 67   Ht 4' 11\" (1.499 m)   Wt 71.7 kg (158 lb)   SpO2 99%   BMI 31.91 kg/m²     Physical Exam  Vitals reviewed.   Constitutional:       General: She is not in acute distress.     Appearance: Normal appearance. She is not ill-appearing, toxic-appearing or diaphoretic.   HENT:      Head: Normocephalic and atraumatic.      Right Ear: External ear normal.      " Left Ear: External ear normal.      Nose: Nose normal.     Eyes:      General: No scleral icterus.        Right eye: No discharge.         Left eye: No discharge.      Extraocular Movements: Extraocular movements intact.      Conjunctiva/sclera: Conjunctivae normal.       Cardiovascular:      Rate and Rhythm: Normal rate and regular rhythm.      Heart sounds: Normal heart sounds. No murmur heard.     No friction rub. No gallop.   Pulmonary:      Effort: Pulmonary effort is normal. No respiratory distress.      Breath sounds: Normal breath sounds. No stridor. No wheezing, rhonchi or rales.   Abdominal:      Palpations: Abdomen is soft.     Musculoskeletal:         General: Normal range of motion.      Cervical back: Normal range of motion.      Right lower leg: No edema.      Left lower leg: No edema.     Skin:     General: Skin is warm.     Neurological:      General: No focal deficit present.      Mental Status: She is alert and oriented to person, place, and time.     Psychiatric:         Mood and Affect: Mood normal.         Behavior: Behavior normal.

## 2025-06-03 NOTE — PATIENT INSTRUCTIONS
"Patient Education     Routine physical for adults   The Basics   Written by the doctors and editors at [facility]   What is a physical? -- A physical is a routine visit, or \"check-up,\" with your doctor. You might also hear it called a \"wellness visit\" or \"preventive visit.\"  During each visit, the doctor will:   Ask about your physical and mental health   Ask about your habits, behaviors, and lifestyle   Do an exam   Give you vaccines if needed   Talk to you about any medicines you take   Give advice about your health   Answer your questions  Getting regular check-ups is an important part of taking care of your health. It can help your doctor find and treat any problems you have. But it's also important for preventing health problems.  A routine physical is different from a \"sick visit.\" A sick visit is when you see a doctor because of a health concern or problem. Since physicals are scheduled ahead of time, you can think about what you want to ask the doctor.  How often should I get a physical? -- It depends on your age and health. In general, for people age 21 years and older:   If you are younger than 50 years, you might be able to get a physical every 3 years.   If you are 50 years or older, your doctor might recommend a physical every year.  If you have an ongoing health condition, like diabetes or high blood pressure, your doctor will probably want to see you more often.  What happens during a physical? -- In general, each visit will include:   Physical exam - The doctor or nurse will check your height, weight, heart rate, and blood pressure. They will also look at your eyes and ears. They will ask about how you are feeling and whether you have any symptoms that bother you.   Medicines - It's a good idea to bring a list of all the medicines you take to each doctor visit. Your doctor will talk to you about your medicines and answer any questions. Tell them if you are having any side effects that bother you. You " "should also tell them if you are having trouble paying for any of your medicines.   Habits and behaviors - This includes:   Your diet   Your exercise habits   Whether you smoke, drink alcohol, or use drugs   Whether you are sexually active   Whether you feel safe at home  Your doctor will talk to you about things you can do to improve your health and lower your risk of health problems. They will also offer help and support. For example, if you want to quit smoking, they can give you advice and might prescribe medicines. If you want to improve your diet or get more physical activity, they can help you with this, too.   Lab tests, if needed - The tests you get will depend on your age and situation. For example, your doctor might want to check your:   Cholesterol   Blood sugar   Iron level   Vaccines - The recommended vaccines will depend on your age, health, and what vaccines you already had. Vaccines are very important because they can prevent certain serious or deadly infections.   Discussion of screening - \"Screening\" means checking for diseases or other health problems before they cause symptoms. Your doctor can recommend screening based on your age, risk, and preferences. This might include tests to check for:   Cancer, such as breast, prostate, cervical, ovarian, colorectal, prostate, lung, or skin cancer   Sexually transmitted infections, such as chlamydia and gonorrhea   Mental health conditions like depression and anxiety  Your doctor will talk to you about the different types of screening tests. They can help you decide which screenings to have. They can also explain what the results might mean.   Answering questions - The physical is a good time to ask the doctor or nurse questions about your health. If needed, they can refer you to other doctors or specialists, too.  Adults older than 65 years often need other care, too. As you get older, your doctor will talk to you about:   How to prevent falling at " home   Hearing or vision tests   Memory testing   How to take your medicines safely   Making sure that you have the help and support you need at home  All topics are updated as new evidence becomes available and our peer review process is complete.  This topic retrieved from [employer] on: May 02, 2024.  Topic 439824 Version 1.0  Release: 32.4.3 - C32.122  © 2024 UpToDate, Inc. and/or its affiliates. All rights reserved.  Consumer Information Use and Disclaimer   Disclaimer: This generalized information is a limited summary of diagnosis, treatment, and/or medication information. It is not meant to be comprehensive and should be used as a tool to help the user understand and/or assess potential diagnostic and treatment options. It does NOT include all information about conditions, treatments, medications, side effects, or risks that may apply to a specific patient. It is not intended to be medical advice or a substitute for the medical advice, diagnosis, or treatment of a health care provider based on the health care provider's examination and assessment of a patient's specific and unique circumstances. Patients must speak with a health care provider for complete information about their health, medical questions, and treatment options, including any risks or benefits regarding use of medications. This information does not endorse any treatments or medications as safe, effective, or approved for treating a specific patient. UpToDate, Inc. and its affiliates disclaim any warranty or liability relating to this information or the use thereof.The use of this information is governed by the Terms of Use, available at https://www.woltersKonyuwer.com/en/know/clinical-effectiveness-terms. 2024© UpToDate, Inc. and its affiliates and/or licensors. All rights reserved.  Copyright   © 2024 UpToDate, Inc. and/or its affiliates. All rights reserved.

## 2025-06-17 ENCOUNTER — OFFICE VISIT (OUTPATIENT)
Dept: FAMILY MEDICINE CLINIC | Facility: CLINIC | Age: 70
End: 2025-06-17
Payer: COMMERCIAL

## 2025-06-17 VITALS
SYSTOLIC BLOOD PRESSURE: 125 MMHG | DIASTOLIC BLOOD PRESSURE: 80 MMHG | BODY MASS INDEX: 31.85 KG/M2 | HEIGHT: 59 IN | HEART RATE: 72 BPM | OXYGEN SATURATION: 99 % | WEIGHT: 158 LBS

## 2025-06-17 DIAGNOSIS — I10 ESSENTIAL HYPERTENSION: Primary | ICD-10-CM

## 2025-06-17 DIAGNOSIS — Z87.891 FORMER SMOKER: ICD-10-CM

## 2025-06-17 DIAGNOSIS — R80.9 MICROALBUMINURIA: ICD-10-CM

## 2025-06-17 PROCEDURE — 99213 OFFICE O/P EST LOW 20 MIN: CPT | Performed by: FAMILY MEDICINE

## 2025-06-17 RX ORDER — LOSARTAN POTASSIUM 25 MG/1
25 TABLET ORAL DAILY
Qty: 90 TABLET | Refills: 1 | Status: SHIPPED | OUTPATIENT
Start: 2025-06-17

## 2025-06-17 NOTE — PROGRESS NOTES
"Name: Rashmi Bray      : 1955      MRN: [id number]  Encounter Provider: Lula Griffin DO  Encounter Date: 2025   Encounter department: [facility]  :  Assessment & Plan  Essential hypertension  - BP stable in office   - dry cough better with switching ACEI to ARB 25mg QD   - stable renal function   - will check urine microalbumin   - diet/exercise   - caution with NSAIDs  - limit Na to 2g/day   - UTD with PCV20   - UTD with annual Flu vaccine   - annual Ophtho exam advised  - RTO in 6months, with labs, for f/u - pt aware and agreeable   Orders:    losartan (COZAAR) 25 mg tablet; Take 1 tablet (25 mg total) by mouth daily    Former smoker         Microalbuminuria                History of Present Illness   HPI  70yo F presents to the office for BP check   - BP in the office 125/80   - ACEI was switched to ARB given dry cough   - cough has gotten better   - denies F/C/N/V/HA/blurry vision or double vision/CP/palpitations/SOB/wheezing/abd pain/LE edema   - labs pending     Review of Systems as per HPI     Objective   /80   Pulse 72   Ht 4' 11\" (1.499 m)   Wt 71.7 kg (158 lb)   SpO2 99%   BMI 31.91 kg/m²      Physical Exam  Vitals reviewed.   Constitutional:       General: She is not in acute distress.     Appearance: Normal appearance. She is not ill-appearing, toxic-appearing or diaphoretic.   HENT:      Head: Normocephalic and atraumatic.      Right Ear: External ear normal.      Left Ear: External ear normal.      Nose: Nose normal.     Eyes:      General: No scleral icterus.        Right eye: No discharge.         Left eye: No discharge.      Extraocular Movements: Extraocular movements intact.      Conjunctiva/sclera: Conjunctivae normal.       Cardiovascular:      Rate and Rhythm: Normal rate and regular rhythm.      Heart sounds: Normal heart sounds. No murmur heard.     No friction rub. No gallop.   Pulmonary:      Effort: Pulmonary effort is normal.      Breath " sounds: Normal breath sounds.   Abdominal:      Palpations: Abdomen is soft.     Musculoskeletal:         General: Normal range of motion.      Cervical back: Normal range of motion.      Right lower leg: No edema.      Left lower leg: No edema.     Skin:     General: Skin is warm.     Neurological:      General: No focal deficit present.      Mental Status: She is alert and oriented to person, place, and time.     Psychiatric:         Mood and Affect: Mood normal.         Behavior: Behavior normal.

## 2025-06-17 NOTE — ASSESSMENT & PLAN NOTE
- BP stable in office   - dry cough better with switching ACEI to ARB 25mg QD   - stable renal function   - will check urine microalbumin   - diet/exercise   - caution with NSAIDs  - limit Na to 2g/day   - UTD with PCV20   - UTD with annual Flu vaccine   - annual Ophtho exam advised  - RTO in 6months, with labs, for f/u - pt aware and agreeable   Orders:    losartan (COZAAR) 25 mg tablet; Take 1 tablet (25 mg total) by mouth daily

## 2025-06-21 ENCOUNTER — APPOINTMENT (OUTPATIENT)
Dept: LAB | Facility: HOSPITAL | Age: 70
End: 2025-06-21
Attending: INTERNAL MEDICINE
Payer: COMMERCIAL

## 2025-06-21 DIAGNOSIS — Z13.220 SCREENING CHOLESTEROL LEVEL: ICD-10-CM

## 2025-06-21 DIAGNOSIS — R79.0 LOW FERRITIN: ICD-10-CM

## 2025-06-21 DIAGNOSIS — E55.9 VITAMIN D INSUFFICIENCY: ICD-10-CM

## 2025-06-21 DIAGNOSIS — I10 ESSENTIAL HYPERTENSION: ICD-10-CM

## 2025-06-21 LAB
25(OH)D3 SERPL-MCNC: 55.4 NG/ML (ref 30–100)
ALBUMIN SERPL BCG-MCNC: 4.3 G/DL (ref 3.5–5)
ALP SERPL-CCNC: 109 U/L (ref 34–104)
ALT SERPL W P-5'-P-CCNC: 38 U/L (ref 7–52)
ANION GAP SERPL CALCULATED.3IONS-SCNC: 9 MMOL/L (ref 4–13)
AST SERPL W P-5'-P-CCNC: 23 U/L (ref 13–39)
BASOPHILS # BLD AUTO: 0.04 THOUSANDS/ÂΜL (ref 0–0.1)
BASOPHILS NFR BLD AUTO: 1 % (ref 0–1)
BILIRUB SERPL-MCNC: 0.45 MG/DL (ref 0.2–1)
BUN SERPL-MCNC: 17 MG/DL (ref 5–25)
CALCIUM SERPL-MCNC: 9.3 MG/DL (ref 8.4–10.2)
CHLORIDE SERPL-SCNC: 104 MMOL/L (ref 96–108)
CHOLEST SERPL-MCNC: 208 MG/DL (ref ?–200)
CO2 SERPL-SCNC: 25 MMOL/L (ref 21–32)
CREAT SERPL-MCNC: 0.77 MG/DL (ref 0.6–1.3)
CREAT UR-MCNC: 56.8 MG/DL
EOSINOPHIL # BLD AUTO: 0.14 THOUSAND/ÂΜL (ref 0–0.61)
EOSINOPHIL NFR BLD AUTO: 3 % (ref 0–6)
ERYTHROCYTE [DISTWIDTH] IN BLOOD BY AUTOMATED COUNT: 12.2 % (ref 11.6–15.1)
FERRITIN SERPL-MCNC: 109 NG/ML (ref 30–307)
FOLATE SERPL-MCNC: >22.3 NG/ML
GFR SERPL CREATININE-BSD FRML MDRD: 80 ML/MIN/1.73SQ M
GLUCOSE P FAST SERPL-MCNC: 95 MG/DL (ref 65–99)
HCT VFR BLD AUTO: 41.3 % (ref 34.8–46.1)
HDLC SERPL-MCNC: 79 MG/DL
HGB BLD-MCNC: 13.4 G/DL (ref 11.5–15.4)
IMM GRANULOCYTES # BLD AUTO: 0.01 THOUSAND/UL (ref 0–0.2)
IMM GRANULOCYTES NFR BLD AUTO: 0 % (ref 0–2)
IRON SATN MFR SERPL: 24 % (ref 15–50)
IRON SERPL-MCNC: 87 UG/DL (ref 50–212)
LDLC SERPL CALC-MCNC: 116 MG/DL (ref 0–100)
LYMPHOCYTES # BLD AUTO: 1.87 THOUSANDS/ÂΜL (ref 0.6–4.47)
LYMPHOCYTES NFR BLD AUTO: 40 % (ref 14–44)
MCH RBC QN AUTO: 29.8 PG (ref 26.8–34.3)
MCHC RBC AUTO-ENTMCNC: 32.4 G/DL (ref 31.4–37.4)
MCV RBC AUTO: 92 FL (ref 82–98)
MICROALBUMIN UR-MCNC: <7 MG/L
MONOCYTES # BLD AUTO: 0.34 THOUSAND/ÂΜL (ref 0.17–1.22)
MONOCYTES NFR BLD AUTO: 7 % (ref 4–12)
NEUTROPHILS # BLD AUTO: 2.27 THOUSANDS/ÂΜL (ref 1.85–7.62)
NEUTS SEG NFR BLD AUTO: 49 % (ref 43–75)
NONHDLC SERPL-MCNC: 129 MG/DL
NRBC BLD AUTO-RTO: 0 /100 WBCS
PLATELET # BLD AUTO: 160 THOUSANDS/UL (ref 149–390)
PMV BLD AUTO: 10.8 FL (ref 8.9–12.7)
POTASSIUM SERPL-SCNC: 4.3 MMOL/L (ref 3.5–5.3)
PROT SERPL-MCNC: 6.7 G/DL (ref 6.4–8.4)
RBC # BLD AUTO: 4.5 MILLION/UL (ref 3.81–5.12)
SODIUM SERPL-SCNC: 138 MMOL/L (ref 135–147)
TIBC SERPL-MCNC: 369.6 UG/DL (ref 250–450)
TRANSFERRIN SERPL-MCNC: 264 MG/DL (ref 203–362)
TRIGL SERPL-MCNC: 65 MG/DL (ref ?–150)
TSH SERPL DL<=0.05 MIU/L-ACNC: 0.78 UIU/ML (ref 0.45–4.5)
UIBC SERPL-MCNC: 283 UG/DL (ref 155–355)
VIT B12 SERPL-MCNC: 673 PG/ML (ref 180–914)
WBC # BLD AUTO: 4.67 THOUSAND/UL (ref 4.31–10.16)

## 2025-06-21 PROCEDURE — 82043 UR ALBUMIN QUANTITATIVE: CPT

## 2025-06-21 PROCEDURE — 80061 LIPID PANEL: CPT

## 2025-06-21 PROCEDURE — 36415 COLL VENOUS BLD VENIPUNCTURE: CPT

## 2025-06-21 PROCEDURE — 82570 ASSAY OF URINE CREATININE: CPT

## 2025-06-21 PROCEDURE — 82306 VITAMIN D 25 HYDROXY: CPT

## 2025-06-21 PROCEDURE — 85025 COMPLETE CBC W/AUTO DIFF WBC: CPT

## 2025-06-21 PROCEDURE — 83540 ASSAY OF IRON: CPT

## 2025-06-21 PROCEDURE — 82607 VITAMIN B-12: CPT

## 2025-06-21 PROCEDURE — 83550 IRON BINDING TEST: CPT

## 2025-06-21 PROCEDURE — 82746 ASSAY OF FOLIC ACID SERUM: CPT

## 2025-06-21 PROCEDURE — 82728 ASSAY OF FERRITIN: CPT

## 2025-06-25 ENCOUNTER — OFFICE VISIT (OUTPATIENT)
Dept: PULMONOLOGY | Facility: MEDICAL CENTER | Age: 70
End: 2025-06-25
Payer: COMMERCIAL

## 2025-06-25 VITALS
BODY MASS INDEX: 32.25 KG/M2 | WEIGHT: 160 LBS | TEMPERATURE: 97.3 F | HEIGHT: 59 IN | RESPIRATION RATE: 12 BRPM | HEART RATE: 73 BPM | SYSTOLIC BLOOD PRESSURE: 122 MMHG | DIASTOLIC BLOOD PRESSURE: 64 MMHG | OXYGEN SATURATION: 96 %

## 2025-06-25 DIAGNOSIS — Z87.891 FORMER SMOKER: Chronic | ICD-10-CM

## 2025-06-25 DIAGNOSIS — R05.3 CHRONIC COUGH: Chronic | ICD-10-CM

## 2025-06-25 DIAGNOSIS — G47.33 OBSTRUCTIVE SLEEP APNEA: Primary | Chronic | ICD-10-CM

## 2025-06-25 PROCEDURE — 99214 OFFICE O/P EST MOD 30 MIN: CPT | Performed by: NURSE PRACTITIONER

## 2025-06-25 NOTE — ASSESSMENT & PLAN NOTE
Is a former smoker she stopped smoking in April 1999.  She did have a chest x-ray of her lungs in April 2024 lungs were clear.  She does not qualify for screening CT of chest

## 2025-06-25 NOTE — PROGRESS NOTES
Follow-up  Visit - Pulmonary Medicine   Name: Rashmi Bray      : 1955      MRN: [id number]  Encounter Provider: JHON Mccabe  Encounter Date: 2025   [facility]  :  Assessment & Plan  Obstructive sleep apnea  Teresita continues to use and benefit from her auto CPAP.  He does have some daytime hypersomnolence after she sleeps.  Her compliance data is reviewed from 2025 to 2025 she is 100% compliant.  Average usage is approximately 5 hours and 45 minutes this is on auto CPAP 5 to 20 cm of water pressure.  Apneic hypopneic index is reduced to 1.2 her 95th percentile was 14.2 max is 15.5 and median 10.6  I am going to ensure pressures from 6 to 16 cm of water pressure.  Additionally she does have a leak per minute of 59.7 she is currently using a nasal mask and I would suggest that she go in for a mask refit.  An and 20 might be a good option for her.      Orders:    PAP DME Resupply/Reorder    Chronic cough  At her last office visit she did report a dry cough.  It has improved since her last visit as she had her antihypertensive medication changed.  She was on an ACE inhibitor.  She does have postnasal drip but otherwise has improvement         Former smoker  Is a former smoker she stopped smoking in 1999.  She did have a chest x-ray of her lungs in 2024 lungs were clear.  She does not qualify for screening CT of chest           Return in about 6 months (around 2025).    History of Present Illness   Rashmi Bray is a 69 y.o. female who presents today for a face-to-face visit.  This 69-year-old female was diagnosed with obstructive sleep apnea in 2019.  She has been using an auto CPAP 5 to 20 cm of water pressure.  At her last visit she did did report a dry cough.  Auto CPAP was ordered at her last visit in 2025 and she is here today for compliance.  Did have a diagnosis of COVID in 2025.    Review of Systems  "  Constitutional:  Positive for fatigue.   HENT: Negative.     Eyes: Negative.    Respiratory: Negative.     Cardiovascular: Negative.    Gastrointestinal: Negative.    Endocrine: Negative.    Genitourinary: Negative.    Musculoskeletal: Negative.    Skin: Negative.    Allergic/Immunologic: Negative.    Neurological: Negative.    Hematological: Negative.    Psychiatric/Behavioral: Negative.         Aside from what is mentioned in the HPI, ROS is otherwise negative         Medical History Reviewed by provider this encounter:  Tobacco  Allergies  Meds  Problems  Med Hx  Surg Hx  Fam Hx     .    Objective   /64 (BP Location: Left arm, Patient Position: Sitting, Cuff Size: Standard)   Pulse 73   Temp (!) 97.3 °F (36.3 °C) (Temporal)   Resp 12   Ht 4' 11\" (1.499 m)   Wt 72.6 kg (160 lb)   SpO2 96%   BMI 32.32 kg/m²     Physical Exam  Constitutional:       Appearance: She is obese.   HENT:      Head: Normocephalic and atraumatic.      Nose: Nose normal.      Mouth/Throat:      Mouth: Mucous membranes are moist.      Pharynx: Oropharynx is clear.      Comments: Mallampati class IV    Eyes:      Pupils: Pupils are equal, round, and reactive to light.       Cardiovascular:      Rate and Rhythm: Normal rate and regular rhythm.      Pulses: Normal pulses.      Heart sounds: Normal heart sounds.   Pulmonary:      Effort: Pulmonary effort is normal.      Breath sounds: Normal breath sounds.     Musculoskeletal:         General: Normal range of motion.      Cervical back: Normal range of motion.     Skin:     General: Skin is warm.      Capillary Refill: Capillary refill takes less than 2 seconds.     Neurological:      General: No focal deficit present.      Mental Status: She is alert and oriented to person, place, and time.     Psychiatric:         Mood and Affect: Mood normal.         Behavior: Behavior normal.           Diagnostic Data:  Labs: I personally reviewed the most recent laboratory data " "pertinent to today's visit.      Radiology results:        PFT/spirometry results:  No results found for: \"FEV1\", \"FVC\", \"WNA1XTW\", \"TLC\", \"DLCO\"       Oximetry testing:      Other studies:      JHON Mccabe      "

## 2025-06-25 NOTE — ASSESSMENT & PLAN NOTE
At her last office visit she did report a dry cough.  It has improved since her last visit as she had her antihypertensive medication changed.  She was on an ACE inhibitor.  She does have postnasal drip but otherwise has improvement

## 2025-06-25 NOTE — ASSESSMENT & PLAN NOTE
Teresita continues to use and benefit from her auto CPAP.  He does have some daytime hypersomnolence after she sleeps.  Her compliance data is reviewed from 5/24/2025 to 6/22/2025 she is 100% compliant.  Average usage is approximately 5 hours and 45 minutes this is on auto CPAP 5 to 20 cm of water pressure.  Apneic hypopneic index is reduced to 1.2 her 95th percentile was 14.2 max is 15.5 and median 10.6  I am going to ensure pressures from 6 to 16 cm of water pressure.  Additionally she does have a leak per minute of 59.7 she is currently using a nasal mask and I would suggest that she go in for a mask refit.  An and 20 might be a good option for her.      Orders:    PAP DME Resupply/Reorder

## 2025-06-27 LAB

## 2025-07-01 ENCOUNTER — OFFICE VISIT (OUTPATIENT)
Dept: HEMATOLOGY ONCOLOGY | Facility: MEDICAL CENTER | Age: 70
End: 2025-07-01
Payer: COMMERCIAL

## 2025-07-01 VITALS
SYSTOLIC BLOOD PRESSURE: 134 MMHG | BODY MASS INDEX: 31.85 KG/M2 | HEART RATE: 80 BPM | RESPIRATION RATE: 14 BRPM | TEMPERATURE: 97.3 F | OXYGEN SATURATION: 98 % | HEIGHT: 59 IN | WEIGHT: 158 LBS | DIASTOLIC BLOOD PRESSURE: 66 MMHG

## 2025-07-01 DIAGNOSIS — R79.0 LOW FERRITIN: Primary | ICD-10-CM

## 2025-07-01 DIAGNOSIS — C67.2 MALIGNANT NEOPLASM OF LATERAL WALL OF URINARY BLADDER (HCC): ICD-10-CM

## 2025-07-01 PROCEDURE — 99213 OFFICE O/P EST LOW 20 MIN: CPT | Performed by: PHYSICIAN ASSISTANT

## 2025-07-01 NOTE — PROGRESS NOTES
Name: Rashmi Bray      : 1955      MRN: [id number]  Encounter Provider: Nadia Ramirez PA-C  Encounter Date: 2025   Encounter department: [facility]  :  Assessment & Plan  Low ferritin  Patient is a 69-year-old who presents for follow-up of low ferritin.     She had lab work drawn on 10/28/2024.  WBC 4.83, hemoglobin 12.0 platelets 193, ferritin 8, iron saturation 15%.  Patient was started on oral iron 2 tablets daily.      She is tolerating oral iron without any issues.     We discussed that iron deficiency is either related to decreased absorption or blood loss.     Patient has history of chronic PPI use.  She may have decreased absorption related to same.  She has also donated blood every 3 months for the past 3 years or so.  Last donation was around 1 year ago. Iron deficiency is also likely related to this as well.       She is up-to-date with her routine health maintenance including her colonoscopy and upper endoscopy which were last completed 2022.     Patient has no evidence of blood loss.     She had lab work drawn on 2025.  WBC 4.67, hemoglobin 13.4, MCV 92, platelets 160.  Ferritin 109, iron saturation 24%, vitamin B12 673, vitamin D 55.4.    Patient is currently taking oral iron 2 tablets daily.      She is having very good response to oral iron.  Patient feels strongly that she wants to resume blood donation.  She plans to donate blood in September.  She will continue the oral iron 2 tablets daily for now.  She can proceed with blood donation in September.  She will return to clinic in around 4 months time with repeat lab work prior.    Orders:    CBC and differential; Future    Comprehensive metabolic panel; Future    Iron Panel (Includes Ferritin, Iron Sat%, Iron, and TIBC); Future    Malignant neoplasm of lateral wall of urinary bladder (HCC)  Patient with past medical history significant for bladder papilloma with atypia.     She had  more recent finding of high-grade noninvasive urothelial carcinoma in the background of low-grade papillary urothelial carcinoma.  She is following closely with Dr. Fraire.  She underwent cystoscopy with bladder biopsy and fulguration along with intravesicle instillation of gemcitabine in June 2024.     Defer to urology for management.      History of Present Illness   Chief Complaint   Patient presents with    Follow-up     Patient is a 69-year-old who presents for follow-up of anemia.     Her past medical history is significant for atypical intracranial meningioma, hypertension, bladder papilloma with atypia.  The small polyp was removed in August 2022.       She had a cystoscopy in May 2024.  Along the posterior bladder wall a small 1 to 2 cm polyp was appreciated.  This had a low-grade and superficial appearance to it.  She underwent cystoscopy with bladder biopsy and fulguration along with intra vesicle instillation of gemcitabine in June 2024.  Pathology was consistent with high-grade noninvasive urothelial carcinoma in the background of low-grade papillary urothelial carcinoma.     She continues with surveillance cystoscopies with Dr. Fraire.  Next due in May 2026.     She had lab work drawn on 10/28/2024.  WBC 4.83, hemoglobin 12.0 platelets 193, ferritin 8, iron saturation 15%.  Patient was started on oral iron 2 tablets daily (in late October 2024).     She is up-to-date with her routine health maintenance.  Last colonoscopy was completed 6/13/2022.  Minimal diverticulosis of the sigmoid colon was noted.  Diminutive colon polyp removed from the distal sigmoid colon.  Repeat colonoscopy recommended in 7 years time.     EGD showed small sliding hiatal hernia.      She denies any evidence of GI blood loss. She also has history of frequent blood donation. She had been donating blood every 3 months up until around 1 year ago (when the anemia was initially discovered).     She is a former smoker of 3ppd for  at least 10 years. She quit smoking in 1999.     She denies nausea, vomiting, bowel changes, chest pain, shortness of breath.      Review of Systems   Constitutional:  Negative for appetite change, fever and unexpected weight change.   HENT:  Negative for nosebleeds.    Respiratory:  Negative for cough, choking and shortness of breath.         Negative hemoptysis.   Cardiovascular:  Negative for chest pain, palpitations and leg swelling.   Gastrointestinal: Negative.  Negative for abdominal distention, abdominal pain, anal bleeding, blood in stool, constipation, diarrhea, nausea and vomiting.   Endocrine: Negative.  Negative for cold intolerance.   Genitourinary: Negative.  Negative for hematuria, menstrual problem, vaginal bleeding, vaginal discharge and vaginal pain.   Musculoskeletal: Negative.  Negative for arthralgias, myalgias, neck pain and neck stiffness.   Skin: Negative.  Negative for color change, pallor and rash.   Allergic/Immunologic: Negative.  Negative for immunocompromised state.   Neurological: Negative.  Negative for weakness and headaches.   Hematological:  Negative for adenopathy. Does not bruise/bleed easily.   All other systems reviewed and are negative.        Objective   Vitals:    07/01/25 0757   BP: 134/66   Pulse: 80   Resp: 14   Temp: (!) 97.3 °F (36.3 °C)   SpO2: 98%     Physical Exam  Constitutional:       General: She is not in acute distress.     Appearance: Normal appearance. She is well-developed.   HENT:      Head: Normocephalic and atraumatic.   Eyes:      General: No scleral icterus.     Conjunctiva/sclera: Conjunctivae normal.   Cardiovascular:      Rate and Rhythm: Normal rate and regular rhythm.   Pulmonary:      Effort: Pulmonary effort is normal. No respiratory distress.      Breath sounds: Normal breath sounds.   Abdominal:      General: Abdomen is flat. There is no distension.      Palpations: Abdomen is soft.      Tenderness: There is no abdominal tenderness.   Skin:      General: Skin is warm.      Coloration: Skin is not pale.      Findings: No rash.   Neurological:      Mental Status: She is alert and oriented to person, place, and time.   Psychiatric:         Mood and Affect: Mood normal.         Thought Content: Thought content normal.         Judgment: Judgment normal.     Labs: I have reviewed the following labs:  Results for orders placed or performed in visit on 06/21/25   CBC and differential   Result Value Ref Range    WBC 4.67 4.31 - 10.16 Thousand/uL    RBC 4.50 3.81 - 5.12 Million/uL    Hemoglobin 13.4 11.5 - 15.4 g/dL    Hematocrit 41.3 34.8 - 46.1 %    MCV 92 82 - 98 fL    MCH 29.8 26.8 - 34.3 pg    MCHC 32.4 31.4 - 37.4 g/dL    RDW 12.2 11.6 - 15.1 %    MPV 10.8 8.9 - 12.7 fL    Platelets 160 149 - 390 Thousands/uL    nRBC 0 /100 WBCs    Segmented % 49 43 - 75 %    Immature Grans % 0 0 - 2 %    Lymphocytes % 40 14 - 44 %    Monocytes % 7 4 - 12 %    Eosinophils Relative 3 0 - 6 %    Basophils Relative 1 0 - 1 %    Absolute Neutrophils 2.27 1.85 - 7.62 Thousands/µL    Absolute Immature Grans 0.01 0.00 - 0.20 Thousand/uL    Absolute Lymphocytes 1.87 0.60 - 4.47 Thousands/µL    Absolute Monocytes 0.34 0.17 - 1.22 Thousand/µL    Eosinophils Absolute 0.14 0.00 - 0.61 Thousand/µL    Basophils Absolute 0.04 0.00 - 0.10 Thousands/µL   Vitamin B12   Result Value Ref Range    Vitamin B-12 673 180 - 914 pg/mL   Result Value Ref Range    Folate >22.3 >5.9 ng/mL   Lipid panel   Result Value Ref Range    Cholesterol 208 (H) See Comment mg/dL    Triglycerides 65 See Comment mg/dL    HDL, Direct 79 >=50 mg/dL    LDL Calculated 116 (H) 0 - 100 mg/dL    Non-HDL-Chol (CHOL-HDL) 129 mg/dl   Albumin / creatinine urine ratio   Result Value Ref Range    Creatinine, Ur 56.8 Reference range not established. mg/dL    Albumin,U,Random <7.0 <20.0 mg/L    Albumin Creat Ratio     Vitamin D 25 hydroxy   Result Value Ref Range    Vit D, 25-Hydroxy 55.4 30.0 - 100.0 ng/mL   TIBC Panel (incl. Iron,  TIBC, % Iron Saturation)   Result Value Ref Range    Iron Saturation 24 15 - 50 %    TIBC 369.6 250 - 450 ug/dL    Iron 87 50 - 212 ug/dL    Transferrin 264 203 - 362 mg/dL    UIBC 283 155 - 355 ug/dL   Result Value Ref Range    Ferritin 109 30 - 307 ng/mL

## 2025-07-01 NOTE — ASSESSMENT & PLAN NOTE
Patient with past medical history significant for bladder papilloma with atypia.     She had more recent finding of high-grade noninvasive urothelial carcinoma in the background of low-grade papillary urothelial carcinoma.  She is following closely with Dr. Fraire.  She underwent cystoscopy with bladder biopsy and fulguration along with intravesicle instillation of gemcitabine in June 2024.     Defer to urology for management.

## (undated) DEVICE — GLOVE INDICATOR PI UNDERGLOVE SZ 7.5 BLUE

## (undated) DEVICE — STERILE CYSTO PACK: Brand: CARDINAL HEALTH

## (undated) DEVICE — CATH FOLEY 22FR 5ML 2 WAY SILICONE ELASTIMER

## (undated) DEVICE — BASIC SINGLE BASIN 2-LF: Brand: MEDLINE INDUSTRIES, INC.

## (undated) DEVICE — SPECIMEN CONTAINER STERILE PEEL PACK

## (undated) DEVICE — Device: Brand: OLYMPUS

## (undated) DEVICE — GLOVE SRG BIOGEL ECLIPSE 7.5

## (undated) DEVICE — PACK TUR

## (undated) DEVICE — CATH FOLEY 18FR 5ML 2 WAY SILICONE ELASTIMER

## (undated) DEVICE — BAG URINE DRAINAGE 2000ML ANTI RFLX LF

## (undated) DEVICE — CATHETER PLUG WITH CAP: Brand: DOVER

## (undated) DEVICE — EVACUATOR BLADDER ELLIK DISP STRL

## (undated) DEVICE — CATHETER ADAPTER: Brand: ADDTO

## (undated) DEVICE — CHEMOTHERAPY CONTAINER,HINGED LID, YELLOW: Brand: SHARPSAFETY

## (undated) DEVICE — PAD GROUNDING ADULT

## (undated) DEVICE — UROCATCH BAG